# Patient Record
Sex: FEMALE | Race: WHITE | Employment: OTHER | URBAN - METROPOLITAN AREA
[De-identification: names, ages, dates, MRNs, and addresses within clinical notes are randomized per-mention and may not be internally consistent; named-entity substitution may affect disease eponyms.]

---

## 2017-01-25 DIAGNOSIS — E78.5 HYPERLIPIDEMIA LDL GOAL <130: ICD-10-CM

## 2017-01-25 DIAGNOSIS — F34.1 DYSTHYMIA: ICD-10-CM

## 2017-01-25 RX ORDER — SIMVASTATIN 20 MG
TABLET ORAL
Qty: 90 TABLET | Refills: 1 | Status: CANCELLED | OUTPATIENT
Start: 2017-01-25

## 2017-02-01 ENCOUNTER — OFFICE VISIT (OUTPATIENT)
Dept: INTERNAL MEDICINE CLINIC | Age: 82
End: 2017-02-01

## 2017-02-01 VITALS
BODY MASS INDEX: 20.02 KG/M2 | HEIGHT: 63 IN | SYSTOLIC BLOOD PRESSURE: 150 MMHG | DIASTOLIC BLOOD PRESSURE: 70 MMHG | TEMPERATURE: 97.6 F | OXYGEN SATURATION: 98 % | HEART RATE: 59 BPM | WEIGHT: 113 LBS

## 2017-02-01 DIAGNOSIS — E78.5 HYPERLIPIDEMIA LDL GOAL <130: ICD-10-CM

## 2017-02-01 DIAGNOSIS — I10 ESSENTIAL HYPERTENSION: Primary | ICD-10-CM

## 2017-02-01 DIAGNOSIS — N39.45 CONTINUOUS LEAKAGE OF URINE: ICD-10-CM

## 2017-02-01 DIAGNOSIS — D64.9 ANEMIA, UNSPECIFIED TYPE: ICD-10-CM

## 2017-02-01 DIAGNOSIS — E03.9 ACQUIRED HYPOTHYROIDISM: ICD-10-CM

## 2017-02-01 PROCEDURE — 4040F PNEUMOC VAC/ADMIN/RCVD: CPT | Performed by: FAMILY MEDICINE

## 2017-02-01 PROCEDURE — G8484 FLU IMMUNIZE NO ADMIN: HCPCS | Performed by: FAMILY MEDICINE

## 2017-02-01 PROCEDURE — 1036F TOBACCO NON-USER: CPT | Performed by: FAMILY MEDICINE

## 2017-02-01 PROCEDURE — 1123F ACP DISCUSS/DSCN MKR DOCD: CPT | Performed by: FAMILY MEDICINE

## 2017-02-01 PROCEDURE — 1090F PRES/ABSN URINE INCON ASSESS: CPT | Performed by: FAMILY MEDICINE

## 2017-02-01 PROCEDURE — G8419 CALC BMI OUT NRM PARAM NOF/U: HCPCS | Performed by: FAMILY MEDICINE

## 2017-02-01 PROCEDURE — G8399 PT W/DXA RESULTS DOCUMENT: HCPCS | Performed by: FAMILY MEDICINE

## 2017-02-01 PROCEDURE — G8427 DOCREV CUR MEDS BY ELIG CLIN: HCPCS | Performed by: FAMILY MEDICINE

## 2017-02-01 PROCEDURE — 99213 OFFICE O/P EST LOW 20 MIN: CPT | Performed by: FAMILY MEDICINE

## 2017-02-01 PROCEDURE — 0509F URINE INCON PLAN DOCD: CPT | Performed by: FAMILY MEDICINE

## 2017-02-01 RX ORDER — SIMVASTATIN 20 MG
TABLET ORAL
Qty: 90 TABLET | Refills: 1 | Status: SHIPPED | OUTPATIENT
Start: 2017-02-01 | End: 2018-01-24 | Stop reason: SDUPTHER

## 2017-02-01 RX ORDER — OXYBUTYNIN CHLORIDE 5 MG/1
TABLET ORAL
Qty: 180 TABLET | Refills: 1 | Status: SHIPPED | OUTPATIENT
Start: 2017-02-01

## 2017-02-01 ASSESSMENT — ENCOUNTER SYMPTOMS: SHORTNESS OF BREATH: 0

## 2017-02-02 ENCOUNTER — TELEPHONE (OUTPATIENT)
Dept: WOUND CARE | Age: 82
End: 2017-02-02

## 2017-02-02 DIAGNOSIS — E03.9 ACQUIRED HYPOTHYROIDISM: ICD-10-CM

## 2017-02-02 LAB — TSH SERPL DL<=0.05 MIU/L-ACNC: 12.63 UIU/ML (ref 0.27–4.2)

## 2017-02-02 RX ORDER — LEVOTHYROXINE SODIUM 0.07 MG/1
TABLET ORAL
Qty: 45 TABLET | Refills: 0 | Status: SHIPPED | OUTPATIENT
Start: 2017-02-02 | End: 2017-05-01 | Stop reason: SDUPTHER

## 2017-02-03 DIAGNOSIS — D64.9 ANEMIA, UNSPECIFIED TYPE: ICD-10-CM

## 2017-02-03 DIAGNOSIS — E78.5 HYPERLIPIDEMIA LDL GOAL <130: ICD-10-CM

## 2017-02-03 DIAGNOSIS — I10 ESSENTIAL HYPERTENSION: ICD-10-CM

## 2017-02-03 LAB
ALBUMIN SERPL-MCNC: 4.3 G/DL (ref 3.4–5)
ALP BLD-CCNC: 68 U/L (ref 40–129)
ALT SERPL-CCNC: 14 U/L (ref 10–40)
ANION GAP SERPL CALCULATED.3IONS-SCNC: 14 MMOL/L (ref 3–16)
AST SERPL-CCNC: 24 U/L (ref 15–37)
BASOPHILS ABSOLUTE: 0.1 K/UL (ref 0–0.2)
BASOPHILS RELATIVE PERCENT: 1.3 %
BILIRUB SERPL-MCNC: 0.7 MG/DL (ref 0–1)
BILIRUBIN DIRECT: <0.2 MG/DL (ref 0–0.3)
BILIRUBIN, INDIRECT: NORMAL MG/DL (ref 0–1)
BUN BLDV-MCNC: 12 MG/DL (ref 7–20)
CALCIUM SERPL-MCNC: 9 MG/DL (ref 8.3–10.6)
CHLORIDE BLD-SCNC: 99 MMOL/L (ref 99–110)
CHOLESTEROL, TOTAL: 152 MG/DL (ref 0–199)
CO2: 24 MMOL/L (ref 21–32)
CREAT SERPL-MCNC: 0.7 MG/DL (ref 0.6–1.2)
EOSINOPHILS ABSOLUTE: 0.1 K/UL (ref 0–0.6)
EOSINOPHILS RELATIVE PERCENT: 1.4 %
GFR AFRICAN AMERICAN: >60
GFR NON-AFRICAN AMERICAN: >60
GLUCOSE BLD-MCNC: 98 MG/DL (ref 70–99)
HCT VFR BLD CALC: 36.3 % (ref 36–48)
HDLC SERPL-MCNC: 57 MG/DL (ref 40–60)
HEMOGLOBIN: 11.8 G/DL (ref 12–16)
IRON SATURATION: 41 % (ref 15–50)
IRON: 124 UG/DL (ref 37–145)
LDL CHOLESTEROL CALCULATED: 76 MG/DL
LYMPHOCYTES ABSOLUTE: 1.8 K/UL (ref 1–5.1)
LYMPHOCYTES RELATIVE PERCENT: 30.3 %
MCH RBC QN AUTO: 29.5 PG (ref 26–34)
MCHC RBC AUTO-ENTMCNC: 32.4 G/DL (ref 31–36)
MCV RBC AUTO: 90.8 FL (ref 80–100)
MONOCYTES ABSOLUTE: 0.4 K/UL (ref 0–1.3)
MONOCYTES RELATIVE PERCENT: 7.4 %
NEUTROPHILS ABSOLUTE: 3.6 K/UL (ref 1.7–7.7)
NEUTROPHILS RELATIVE PERCENT: 59.6 %
PDW BLD-RTO: 14 % (ref 12.4–15.4)
PLATELET # BLD: 345 K/UL (ref 135–450)
PMV BLD AUTO: 7.9 FL (ref 5–10.5)
POTASSIUM SERPL-SCNC: 3.9 MMOL/L (ref 3.5–5.1)
RBC # BLD: 4 M/UL (ref 4–5.2)
SODIUM BLD-SCNC: 137 MMOL/L (ref 136–145)
TOTAL IRON BINDING CAPACITY: 299 UG/DL (ref 260–445)
TOTAL PROTEIN: 6.7 G/DL (ref 6.4–8.2)
TRIGL SERPL-MCNC: 95 MG/DL (ref 0–150)
VLDLC SERPL CALC-MCNC: 19 MG/DL
WBC # BLD: 6 K/UL (ref 4–11)

## 2017-02-03 RX ORDER — AMITRIPTYLINE HYDROCHLORIDE 10 MG/1
TABLET, FILM COATED ORAL
Qty: 90 TABLET | Refills: 1 | Status: SHIPPED | OUTPATIENT
Start: 2017-02-03 | End: 2017-07-31 | Stop reason: SDUPTHER

## 2017-02-20 DIAGNOSIS — E03.9 ACQUIRED HYPOTHYROIDISM: ICD-10-CM

## 2017-03-04 RX ORDER — LEVOTHYROXINE SODIUM 0.07 MG/1
TABLET ORAL
Qty: 45 TABLET | Refills: 0 | OUTPATIENT
Start: 2017-03-04

## 2017-05-01 ENCOUNTER — OFFICE VISIT (OUTPATIENT)
Dept: INTERNAL MEDICINE CLINIC | Age: 82
End: 2017-05-01

## 2017-05-01 VITALS
TEMPERATURE: 97.3 F | DIASTOLIC BLOOD PRESSURE: 52 MMHG | BODY MASS INDEX: 20.98 KG/M2 | HEART RATE: 71 BPM | SYSTOLIC BLOOD PRESSURE: 138 MMHG | RESPIRATION RATE: 14 BRPM | HEIGHT: 62 IN | WEIGHT: 114 LBS | OXYGEN SATURATION: 96 %

## 2017-05-01 DIAGNOSIS — E03.9 ACQUIRED HYPOTHYROIDISM: ICD-10-CM

## 2017-05-01 DIAGNOSIS — I10 ESSENTIAL HYPERTENSION: Primary | ICD-10-CM

## 2017-05-01 DIAGNOSIS — Z23 NEED FOR PNEUMOCOCCAL VACCINATION: ICD-10-CM

## 2017-05-01 LAB — TSH SERPL DL<=0.05 MIU/L-ACNC: 0.31 UIU/ML (ref 0.27–4.2)

## 2017-05-01 PROCEDURE — 1090F PRES/ABSN URINE INCON ASSESS: CPT | Performed by: FAMILY MEDICINE

## 2017-05-01 PROCEDURE — G8427 DOCREV CUR MEDS BY ELIG CLIN: HCPCS | Performed by: FAMILY MEDICINE

## 2017-05-01 PROCEDURE — 99213 OFFICE O/P EST LOW 20 MIN: CPT | Performed by: FAMILY MEDICINE

## 2017-05-01 PROCEDURE — G0009 ADMIN PNEUMOCOCCAL VACCINE: HCPCS | Performed by: FAMILY MEDICINE

## 2017-05-01 PROCEDURE — G8419 CALC BMI OUT NRM PARAM NOF/U: HCPCS | Performed by: FAMILY MEDICINE

## 2017-05-01 PROCEDURE — 1036F TOBACCO NON-USER: CPT | Performed by: FAMILY MEDICINE

## 2017-05-01 PROCEDURE — 1123F ACP DISCUSS/DSCN MKR DOCD: CPT | Performed by: FAMILY MEDICINE

## 2017-05-01 PROCEDURE — 4040F PNEUMOC VAC/ADMIN/RCVD: CPT | Performed by: FAMILY MEDICINE

## 2017-05-01 PROCEDURE — 90732 PPSV23 VACC 2 YRS+ SUBQ/IM: CPT | Performed by: FAMILY MEDICINE

## 2017-05-01 PROCEDURE — G8399 PT W/DXA RESULTS DOCUMENT: HCPCS | Performed by: FAMILY MEDICINE

## 2017-05-01 RX ORDER — LEVOTHYROXINE SODIUM 0.07 MG/1
TABLET ORAL
Qty: 90 TABLET | Refills: 0 | Status: SHIPPED | OUTPATIENT
Start: 2017-05-01 | End: 2017-10-06 | Stop reason: SDUPTHER

## 2017-05-01 ASSESSMENT — ENCOUNTER SYMPTOMS: SHORTNESS OF BREATH: 0

## 2017-05-12 ENCOUNTER — TELEPHONE (OUTPATIENT)
Dept: INTERNAL MEDICINE CLINIC | Age: 82
End: 2017-05-12

## 2017-07-28 DIAGNOSIS — I10 ESSENTIAL HYPERTENSION: ICD-10-CM

## 2017-07-31 DIAGNOSIS — F34.1 DYSTHYMIA: ICD-10-CM

## 2017-08-04 RX ORDER — AMITRIPTYLINE HYDROCHLORIDE 10 MG/1
TABLET, FILM COATED ORAL
Qty: 90 TABLET | Refills: 0 | Status: SHIPPED | OUTPATIENT
Start: 2017-08-04 | End: 2017-11-02 | Stop reason: SDUPTHER

## 2018-01-24 DIAGNOSIS — E78.5 HYPERLIPIDEMIA LDL GOAL <130: ICD-10-CM

## 2018-01-25 RX ORDER — SIMVASTATIN 20 MG
TABLET ORAL
Qty: 45 TABLET | Refills: 0 | Status: SHIPPED | OUTPATIENT
Start: 2018-01-25 | End: 2018-05-10 | Stop reason: SDUPTHER

## 2018-07-02 ENCOUNTER — HOSPITAL ENCOUNTER (OUTPATIENT)
Dept: OTHER | Age: 83
Discharge: HOME | End: 2018-07-02
Attending: FAMILY MEDICINE | Admitting: FAMILY MEDICINE
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
## 2018-07-05 ENCOUNTER — OFFICE VISIT (OUTPATIENT)
Dept: ORTHOPEDIC SURGERY | Age: 83
End: 2018-07-05

## 2018-07-05 VITALS
DIASTOLIC BLOOD PRESSURE: 70 MMHG | WEIGHT: 114 LBS | BODY MASS INDEX: 21.52 KG/M2 | SYSTOLIC BLOOD PRESSURE: 161 MMHG | HEART RATE: 65 BPM | RESPIRATION RATE: 16 BRPM | HEIGHT: 61 IN

## 2018-07-05 DIAGNOSIS — M19.132 SLAC (SCAPHOLUNATE ADVANCED COLLAPSE) OF WRIST, LEFT: ICD-10-CM

## 2018-07-05 DIAGNOSIS — S63.502A WRIST SPRAIN, LEFT, INITIAL ENCOUNTER: Primary | ICD-10-CM

## 2018-07-05 DIAGNOSIS — M81.0 OSTEOPOROSIS WITHOUT CURRENT PATHOLOGICAL FRACTURE, UNSPECIFIED OSTEOPOROSIS TYPE: ICD-10-CM

## 2018-07-05 PROCEDURE — 1123F ACP DISCUSS/DSCN MKR DOCD: CPT | Performed by: ORTHOPAEDIC SURGERY

## 2018-07-05 PROCEDURE — G8420 CALC BMI NORM PARAMETERS: HCPCS | Performed by: ORTHOPAEDIC SURGERY

## 2018-07-05 PROCEDURE — 1036F TOBACCO NON-USER: CPT | Performed by: ORTHOPAEDIC SURGERY

## 2018-07-05 PROCEDURE — 4040F PNEUMOC VAC/ADMIN/RCVD: CPT | Performed by: ORTHOPAEDIC SURGERY

## 2018-07-05 PROCEDURE — 99203 OFFICE O/P NEW LOW 30 MIN: CPT | Performed by: ORTHOPAEDIC SURGERY

## 2018-07-05 PROCEDURE — G8427 DOCREV CUR MEDS BY ELIG CLIN: HCPCS | Performed by: ORTHOPAEDIC SURGERY

## 2018-07-05 PROCEDURE — 1090F PRES/ABSN URINE INCON ASSESS: CPT | Performed by: ORTHOPAEDIC SURGERY

## 2018-07-05 NOTE — PROGRESS NOTES
ORTHOPAEDIC CONSULTATION NOTE    Chief Complaint   Patient presents with    Pain     Left wrist  DOI 7/1/18       HPI  80 y.o. female seen in consultation at the request of Dr Tameka Flanagan for evaluation of left wrist injury  She is ambidextrous  Onset 4 days ago   Injury/trauma yes, tripped, [address]  Pain is located dorsal wrist only  Worse with lifting, wrist ROM  Better with immobilization  Associated with N/A    History of symptoms none      I have reviewed and discussed the below pain assessment findings with the patient. Pain Assessment  Location of Pain: Wrist  Location Modifiers: Left  Severity of Pain: 4  Quality of Pain: Aching  Duration of Pain: A few minutes  Frequency of Pain: Intermittent  Aggravating Factors: Bending  Limiting Behavior: Some  Relieving Factors: Rest (brace)  Result of Injury: Yes  Work-Related Injury: No  Are there other pain locations you wish to document?: No    ROS  I have read over the ROS from the Patient History Form dated on 7/5/2018  Pertinent positives include hearing impairment, thryoid disaese, HTN, urinary incontinence, intermittent LBP, depression  Rest of 13 point ROS otherwise negative except per HPI, and scanned into the patient's chart under the Media tab.       Allergies   Allergen Reactions    Prozac [Fluoxetine Hcl] Nausea Only        Current Outpatient Prescriptions   Medication Sig Dispense Refill    simvastatin (ZOCOR) 20 MG tablet TAKE ONE- HALF (1/2) TABLET BY MOUTH ONCE NIGHTLY 45 tablet 2    metoprolol tartrate (LOPRESSOR) 25 MG tablet TAKE ONE TABLET BY MOUTH TWICE A  tablet 2    amitriptyline (ELAVIL) 10 MG tablet TAKE ONE TABLET BY MOUTH ONCE NIGHTLY 90 tablet 2    levothyroxine (SYNTHROID) 75 MCG tablet TAKE ONE TABLET BY MOUTH DAILY 90 tablet 1    oxybutynin (DITROPAN) 5 MG tablet TAKE ONE TABLET BY MOUTH TWICE A  tablet 1    vitamin B-12 (CYANOCOBALAMIN) 100 MCG tablet Take 50 mcg by mouth daily      Omega 3 1200 MG CAPS Take by distributions; AIN/PIN/IO intact  Left UE - normal shoulder and elbow exam and ROM, no TTP   Full E/F, S/P   Wrist - ecchymosis of dorsal wrist, into digits    TTP dorsal wrist only    Crepitus with ROM    No pain with Mccauley test    No TTP anatomic snuffbox and scaphoid tubercle    Imaging:  Images were personally reviewed by myself and discussed with the patient  Narrative   EXAMINATION:   3 XRAY VIEWS OF THE LEFT WRIST; 3 XRAY VIEWS OF THE LEFT HAND       7/2/2018 12:58 pm; 7/2/2018 1:04 pm       COMPARISON:   None       HISTORY:   ORDERING PHYSICIAN PROVIDED HISTORY: Fall, initial encounter   TECHNOLOGIST PROVIDED HISTORY:   Technologist Provided Reason for Exam: fell last night/pain laterally of left   wrist   Acuity: Acute   Type of Encounter: Initial; ORDERING PHYSICIAN PROVIDED HISTORY: Fall,   initial encounter   TECHNOLOGIST PROVIDED HISTORY:   Technologist Provided Reason for Exam: fell last night/pain laterally left   wrist   Acuity: Acute   Type of Encounter: Initial       FINDINGS:   Three views of the left wrist demonstrate calcifications in the TFC complex. There is widening of the scapholunate interval with rotary subluxation of the   scaphoid.  There is advanced collapse of the capitate into the scapholunate   interval.  No fracture appreciated.       Carpometacarpal alignment is normal.  Moderate degenerative changes 1st MCP   joint.  Moderate degenerative changes 2nd and 3rd MCP joint.  Spurring about   the interphalangeal joints is noted.       Mild bony demineralization.           Impression   No acute bony fracture is appreciated.  Study limited by bony   demineralization.       Marked degenerative changes about the hand and wrist.  There is widened   scapholunate interval with findings to suggest rotary subluxation of the   scaphoid and SLAC wrist.         Assessment & Plan:  80 y.o. female who presents with    Diagnosis Orders   1. Wrist sprain, left, initial encounter     2.  Slac

## 2018-08-04 DIAGNOSIS — I10 ESSENTIAL HYPERTENSION: ICD-10-CM

## 2018-08-04 DIAGNOSIS — F34.1 DYSTHYMIA: ICD-10-CM

## 2018-08-06 RX ORDER — AMITRIPTYLINE HYDROCHLORIDE 10 MG/1
TABLET, FILM COATED ORAL
Qty: 90 TABLET | Refills: 1 | OUTPATIENT
Start: 2018-08-06

## 2018-08-07 DIAGNOSIS — F34.1 DYSTHYMIA: ICD-10-CM

## 2018-08-07 DIAGNOSIS — I10 ESSENTIAL HYPERTENSION: ICD-10-CM

## 2018-08-07 RX ORDER — AMITRIPTYLINE HYDROCHLORIDE 10 MG/1
TABLET, FILM COATED ORAL
Qty: 90 TABLET | Refills: 1 | OUTPATIENT
Start: 2018-08-07

## 2018-08-21 DIAGNOSIS — F34.1 DYSTHYMIA: ICD-10-CM

## 2018-08-21 DIAGNOSIS — I10 ESSENTIAL HYPERTENSION: ICD-10-CM

## 2018-08-21 RX ORDER — AMITRIPTYLINE HYDROCHLORIDE 10 MG/1
TABLET, FILM COATED ORAL
Qty: 90 TABLET | Refills: 1 | OUTPATIENT
Start: 2018-08-21

## 2018-08-30 DIAGNOSIS — F34.1 DYSTHYMIA: ICD-10-CM

## 2018-08-30 RX ORDER — AMITRIPTYLINE HYDROCHLORIDE 10 MG/1
TABLET, FILM COATED ORAL
Qty: 90 TABLET | Refills: 1 | OUTPATIENT
Start: 2018-08-30

## 2018-10-02 ENCOUNTER — HOSPITAL ENCOUNTER (INPATIENT)
Age: 83
LOS: 2 days | Discharge: HOME | DRG: 308 | End: 2018-10-04
Attending: EMERGENCY MEDICINE | Admitting: INTERNAL MEDICINE
Payer: MEDICARE

## 2018-10-02 ENCOUNTER — APPOINTMENT (OUTPATIENT)
Dept: GENERAL RADIOLOGY | Age: 83
DRG: 308 | End: 2018-10-02
Payer: MEDICARE

## 2018-10-02 ENCOUNTER — APPOINTMENT (OUTPATIENT)
Dept: CT IMAGING | Age: 83
DRG: 308 | End: 2018-10-02
Payer: MEDICARE

## 2018-10-02 DIAGNOSIS — I48.91 ATRIAL FIBRILLATION WITH RVR (HCC): ICD-10-CM

## 2018-10-02 DIAGNOSIS — J18.9 PNEUMONIA DUE TO ORGANISM: ICD-10-CM

## 2018-10-02 DIAGNOSIS — A41.9 SEPTICEMIA (HCC): Primary | ICD-10-CM

## 2018-10-02 PROBLEM — E87.1 HYPONATREMIA: Status: ACTIVE | Noted: 2018-10-02

## 2018-10-02 PROBLEM — D64.9 ANEMIA: Status: ACTIVE | Noted: 2018-10-02

## 2018-10-02 LAB
A/G RATIO: 1.1 (ref 1.1–2.2)
ALBUMIN SERPL-MCNC: 4.1 G/DL (ref 3.4–5)
ALP BLD-CCNC: 79 U/L (ref 40–129)
ALT SERPL-CCNC: 9 U/L (ref 10–40)
ANION GAP SERPL CALCULATED.3IONS-SCNC: 12 MMOL/L (ref 3–16)
APTT: 29.7 SEC (ref 26–36)
AST SERPL-CCNC: 22 U/L (ref 15–37)
BASOPHILS ABSOLUTE: 0 K/UL (ref 0–0.2)
BASOPHILS RELATIVE PERCENT: 0.3 %
BILIRUB SERPL-MCNC: 1.2 MG/DL (ref 0–1)
BUN BLDV-MCNC: 11 MG/DL (ref 7–20)
CALCIUM SERPL-MCNC: 9.4 MG/DL (ref 8.3–10.6)
CHLORIDE BLD-SCNC: 93 MMOL/L (ref 99–110)
CO2: 23 MMOL/L (ref 21–32)
CREAT SERPL-MCNC: 0.6 MG/DL (ref 0.6–1.2)
EKG ATRIAL RATE: 129 BPM
EKG ATRIAL RATE: 150 BPM
EKG DIAGNOSIS: NORMAL
EKG DIAGNOSIS: NORMAL
EKG Q-T INTERVAL: 280 MS
EKG Q-T INTERVAL: 294 MS
EKG QRS DURATION: 90 MS
EKG QRS DURATION: 92 MS
EKG QTC CALCULATION (BAZETT): 439 MS
EKG QTC CALCULATION (BAZETT): 445 MS
EKG R AXIS: -65 DEGREES
EKG R AXIS: -69 DEGREES
EKG T AXIS: 94 DEGREES
EKG T AXIS: 97 DEGREES
EKG VENTRICULAR RATE: 138 BPM
EKG VENTRICULAR RATE: 148 BPM
EOSINOPHILS ABSOLUTE: 0 K/UL (ref 0–0.6)
EOSINOPHILS RELATIVE PERCENT: 0 %
GFR AFRICAN AMERICAN: >60
GFR NON-AFRICAN AMERICAN: >60
GLOBULIN: 3.6 G/DL
GLUCOSE BLD-MCNC: 111 MG/DL (ref 70–99)
HCT VFR BLD CALC: 34.7 % (ref 36–48)
HEMOGLOBIN: 11.6 G/DL (ref 12–16)
INR BLD: 1.14 (ref 0.86–1.14)
LACTIC ACID: 1.3 MMOL/L (ref 0.4–2)
LYMPHOCYTES ABSOLUTE: 0.5 K/UL (ref 1–5.1)
LYMPHOCYTES RELATIVE PERCENT: 2.9 %
MCH RBC QN AUTO: 31.4 PG (ref 26–34)
MCHC RBC AUTO-ENTMCNC: 33.3 G/DL (ref 31–36)
MCV RBC AUTO: 94.2 FL (ref 80–100)
MONOCYTES ABSOLUTE: 0.5 K/UL (ref 0–1.3)
MONOCYTES RELATIVE PERCENT: 2.9 %
NEUTROPHILS ABSOLUTE: 15.7 K/UL (ref 1.7–7.7)
NEUTROPHILS RELATIVE PERCENT: 93.9 %
PDW BLD-RTO: 14.2 % (ref 12.4–15.4)
PLATELET # BLD: 280 K/UL (ref 135–450)
PMV BLD AUTO: 7.1 FL (ref 5–10.5)
POTASSIUM REFLEX MAGNESIUM: 3.8 MMOL/L (ref 3.5–5.1)
PRO-BNP: 2328 PG/ML (ref 0–449)
PROTHROMBIN TIME: 13 SEC (ref 9.8–13)
RAPID INFLUENZA  B AGN: NEGATIVE
RAPID INFLUENZA A AGN: NEGATIVE
RBC # BLD: 3.68 M/UL (ref 4–5.2)
SODIUM BLD-SCNC: 128 MMOL/L (ref 136–145)
TOTAL PROTEIN: 7.7 G/DL (ref 6.4–8.2)
TROPONIN: <0.01 NG/ML
TSH REFLEX: 3.74 UIU/ML (ref 0.27–4.2)
WBC # BLD: 16.8 K/UL (ref 4–11)

## 2018-10-02 PROCEDURE — 84439 ASSAY OF FREE THYROXINE: CPT

## 2018-10-02 PROCEDURE — 36415 COLL VENOUS BLD VENIPUNCTURE: CPT

## 2018-10-02 PROCEDURE — 80053 COMPREHEN METABOLIC PANEL: CPT

## 2018-10-02 PROCEDURE — 6370000000 HC RX 637 (ALT 250 FOR IP): Performed by: INTERNAL MEDICINE

## 2018-10-02 PROCEDURE — 84443 ASSAY THYROID STIM HORMONE: CPT

## 2018-10-02 PROCEDURE — 96361 HYDRATE IV INFUSION ADD-ON: CPT

## 2018-10-02 PROCEDURE — 87804 INFLUENZA ASSAY W/OPTIC: CPT

## 2018-10-02 PROCEDURE — 2500000003 HC RX 250 WO HCPCS: Performed by: INTERNAL MEDICINE

## 2018-10-02 PROCEDURE — 2060000000 HC ICU INTERMEDIATE R&B

## 2018-10-02 PROCEDURE — 83880 ASSAY OF NATRIURETIC PEPTIDE: CPT

## 2018-10-02 PROCEDURE — 96376 TX/PRO/DX INJ SAME DRUG ADON: CPT

## 2018-10-02 PROCEDURE — 85025 COMPLETE CBC W/AUTO DIFF WBC: CPT

## 2018-10-02 PROCEDURE — 2580000003 HC RX 258: Performed by: PHYSICIAN ASSISTANT

## 2018-10-02 PROCEDURE — 93005 ELECTROCARDIOGRAM TRACING: CPT | Performed by: PHYSICIAN ASSISTANT

## 2018-10-02 PROCEDURE — 87040 BLOOD CULTURE FOR BACTERIA: CPT

## 2018-10-02 PROCEDURE — 6360000002 HC RX W HCPCS: Performed by: PHYSICIAN ASSISTANT

## 2018-10-02 PROCEDURE — 85730 THROMBOPLASTIN TIME PARTIAL: CPT

## 2018-10-02 PROCEDURE — 6360000002 HC RX W HCPCS: Performed by: INTERNAL MEDICINE

## 2018-10-02 PROCEDURE — 2500000003 HC RX 250 WO HCPCS: Performed by: PHYSICIAN ASSISTANT

## 2018-10-02 PROCEDURE — 93010 ELECTROCARDIOGRAM REPORT: CPT | Performed by: INTERNAL MEDICINE

## 2018-10-02 PROCEDURE — 99291 CRITICAL CARE FIRST HOUR: CPT

## 2018-10-02 PROCEDURE — 96366 THER/PROPH/DIAG IV INF ADDON: CPT

## 2018-10-02 PROCEDURE — 71045 X-RAY EXAM CHEST 1 VIEW: CPT

## 2018-10-02 PROCEDURE — 96365 THER/PROPH/DIAG IV INF INIT: CPT

## 2018-10-02 PROCEDURE — 2580000003 HC RX 258: Performed by: INTERNAL MEDICINE

## 2018-10-02 PROCEDURE — 71250 CT THORAX DX C-: CPT

## 2018-10-02 PROCEDURE — 96375 TX/PRO/DX INJ NEW DRUG ADDON: CPT

## 2018-10-02 PROCEDURE — 85610 PROTHROMBIN TIME: CPT

## 2018-10-02 PROCEDURE — 83605 ASSAY OF LACTIC ACID: CPT

## 2018-10-02 PROCEDURE — 84484 ASSAY OF TROPONIN QUANT: CPT

## 2018-10-02 RX ORDER — DILTIAZEM HYDROCHLORIDE 5 MG/ML
10 INJECTION INTRAVENOUS ONCE
Status: DISCONTINUED | OUTPATIENT
Start: 2018-10-02 | End: 2018-10-02

## 2018-10-02 RX ORDER — SODIUM CHLORIDE 0.9 % (FLUSH) 0.9 %
10 SYRINGE (ML) INJECTION PRN
Status: DISCONTINUED | OUTPATIENT
Start: 2018-10-02 | End: 2018-10-04 | Stop reason: HOSPADM

## 2018-10-02 RX ORDER — DILTIAZEM HYDROCHLORIDE 5 MG/ML
10 INJECTION INTRAVENOUS ONCE
Status: COMPLETED | OUTPATIENT
Start: 2018-10-02 | End: 2018-10-02

## 2018-10-02 RX ORDER — POTASSIUM CHLORIDE 7.45 MG/ML
10 INJECTION INTRAVENOUS PRN
Status: DISCONTINUED | OUTPATIENT
Start: 2018-10-02 | End: 2018-10-04 | Stop reason: HOSPADM

## 2018-10-02 RX ORDER — ONDANSETRON 2 MG/ML
4 INJECTION INTRAMUSCULAR; INTRAVENOUS EVERY 6 HOURS PRN
Status: DISCONTINUED | OUTPATIENT
Start: 2018-10-02 | End: 2018-10-04 | Stop reason: HOSPADM

## 2018-10-02 RX ORDER — OXYBUTYNIN CHLORIDE 5 MG/1
5 TABLET ORAL 2 TIMES DAILY
Status: DISCONTINUED | OUTPATIENT
Start: 2018-10-02 | End: 2018-10-04 | Stop reason: HOSPADM

## 2018-10-02 RX ORDER — AZITHROMYCIN 250 MG/1
250 TABLET, FILM COATED ORAL NIGHTLY
Status: DISCONTINUED | OUTPATIENT
Start: 2018-10-02 | End: 2018-10-04 | Stop reason: HOSPADM

## 2018-10-02 RX ORDER — SIMVASTATIN 10 MG
10 TABLET ORAL NIGHTLY
Status: DISCONTINUED | OUTPATIENT
Start: 2018-10-02 | End: 2018-10-04 | Stop reason: HOSPADM

## 2018-10-02 RX ORDER — 0.9 % SODIUM CHLORIDE 0.9 %
1000 INTRAVENOUS SOLUTION INTRAVENOUS ONCE
Status: COMPLETED | OUTPATIENT
Start: 2018-10-02 | End: 2018-10-02

## 2018-10-02 RX ORDER — ASPIRIN 81 MG/1
81 TABLET ORAL NIGHTLY
Status: DISCONTINUED | OUTPATIENT
Start: 2018-10-02 | End: 2018-10-04 | Stop reason: HOSPADM

## 2018-10-02 RX ORDER — UBIDECARENONE 75 MG
50 CAPSULE ORAL DAILY
Status: DISCONTINUED | OUTPATIENT
Start: 2018-10-02 | End: 2018-10-04 | Stop reason: HOSPADM

## 2018-10-02 RX ORDER — SODIUM CHLORIDE 0.9 % (FLUSH) 0.9 %
10 SYRINGE (ML) INJECTION EVERY 12 HOURS SCHEDULED
Status: DISCONTINUED | OUTPATIENT
Start: 2018-10-02 | End: 2018-10-04 | Stop reason: HOSPADM

## 2018-10-02 RX ORDER — AMITRIPTYLINE HYDROCHLORIDE 10 MG/1
10 TABLET, FILM COATED ORAL NIGHTLY
Status: DISCONTINUED | OUTPATIENT
Start: 2018-10-02 | End: 2018-10-04 | Stop reason: HOSPADM

## 2018-10-02 RX ORDER — POTASSIUM CHLORIDE 20 MEQ/1
40 TABLET, EXTENDED RELEASE ORAL PRN
Status: DISCONTINUED | OUTPATIENT
Start: 2018-10-02 | End: 2018-10-04 | Stop reason: HOSPADM

## 2018-10-02 RX ORDER — LEVOTHYROXINE SODIUM 0.07 MG/1
75 TABLET ORAL
Status: DISCONTINUED | OUTPATIENT
Start: 2018-10-03 | End: 2018-10-04 | Stop reason: HOSPADM

## 2018-10-02 RX ORDER — LEVOFLOXACIN 5 MG/ML
750 INJECTION, SOLUTION INTRAVENOUS ONCE
Status: COMPLETED | OUTPATIENT
Start: 2018-10-02 | End: 2018-10-02

## 2018-10-02 RX ORDER — M-VIT,TX,IRON,MINS/CALC/FOLIC 27MG-0.4MG
1 TABLET ORAL DAILY
Status: DISCONTINUED | OUTPATIENT
Start: 2018-10-02 | End: 2018-10-04 | Stop reason: HOSPADM

## 2018-10-02 RX ADMIN — ASPIRIN 81 MG: 81 TABLET, COATED ORAL at 21:55

## 2018-10-02 RX ADMIN — AZITHROMYCIN 250 MG: 250 TABLET, FILM COATED ORAL at 21:55

## 2018-10-02 RX ADMIN — LEVOFLOXACIN 750 MG: 5 INJECTION, SOLUTION INTRAVENOUS at 15:09

## 2018-10-02 RX ADMIN — CEFTRIAXONE SODIUM 1 G: 10 INJECTION, POWDER, FOR SOLUTION INTRAVENOUS at 16:48

## 2018-10-02 RX ADMIN — DILTIAZEM HYDROCHLORIDE 5 MG/HR: 5 INJECTION INTRAVENOUS at 17:15

## 2018-10-02 RX ADMIN — SODIUM CHLORIDE 1000 ML: 9 INJECTION, SOLUTION INTRAVENOUS at 15:09

## 2018-10-02 RX ADMIN — DILTIAZEM HYDROCHLORIDE 10 MG: 5 INJECTION INTRAVENOUS at 16:57

## 2018-10-02 RX ADMIN — SIMVASTATIN 10 MG: 10 TABLET, FILM COATED ORAL at 22:05

## 2018-10-02 RX ADMIN — METOPROLOL TARTRATE 25 MG: 25 TABLET ORAL at 21:55

## 2018-10-02 RX ADMIN — Medication 10 ML: at 22:06

## 2018-10-02 RX ADMIN — AMITRIPTYLINE HYDROCHLORIDE 10 MG: 10 TABLET, FILM COATED ORAL at 21:55

## 2018-10-02 RX ADMIN — OXYBUTYNIN CHLORIDE 5 MG: 5 TABLET ORAL at 21:55

## 2018-10-02 RX ADMIN — DILTIAZEM HYDROCHLORIDE 10 MG: 5 INJECTION INTRAVENOUS at 15:05

## 2018-10-02 RX ADMIN — ENOXAPARIN SODIUM 50 MG: 60 INJECTION SUBCUTANEOUS at 21:57

## 2018-10-02 ASSESSMENT — ENCOUNTER SYMPTOMS
DOUBLE VISION: 0
SORE THROAT: 0
BLOOD IN STOOL: 0
SHORTNESS OF BREATH: 1
SHORTNESS OF BREATH: 0
BLURRED VISION: 0
NAUSEA: 0
VOMITING: 0
COUGH: 1
TROUBLE SWALLOWING: 0
DIARRHEA: 1
ABDOMINAL PAIN: 0

## 2018-10-02 ASSESSMENT — PAIN SCALES - GENERAL
PAINLEVEL_OUTOF10: 0

## 2018-10-02 NOTE — H&P
History and Physical  Dr. Carole Mishra  10/2/2018    PCP: Dane Swann MD    Cc:   Chief Complaint   Patient presents with    Atrial Fibrillation     went to MD for cough EKG showed afib with RVR. HPI:  Michael Felix is a 80 y.o. female who  has a past medical history of Carpal tunnel syndrome of left wrist; Chronic low back pain; Depression; [place] (hard of hearing); Hyperlipidemia LDL goal <130; Hypothyroid; Insomnia; Osteoporosis; and Urinary incontinence. Patient presents with Atrial fibrillation ([address] HPI of presenting complaint in block format: (1-3 for expanded problem focused, ?4 for detailed/comprehensive)   Location: generalized weakness, chest cough  Duration: for about a week  Timing:    Context:  80 y.o. female that presents to the emergency department with a chief complaint feeling generally weak, mild shortness of breath and cough. She's been having a cough for the past one week. Probably had an appointment with her family physician today and daughter states that she was extremely weak or having difficulty even getting her out of bed. Patient one episode of diarrhea today. She denies any chest pain, abdominal pain, dysuria, hematuria, bloody stool or any pain at all. Patient's initial heart rate was in the 140s and showed atrial fibrillation. Case has been discussed today with PCP, Dr Vladimir Fajardo  Severity: severe - pt is so weak that she can barely get out of bed  Quality:    Modifying Factors: nothing makes it better or worse  Associated Signs or Symptoms: denies CP. +Palpitation. No fevers/chills. Denies dysuria. +diarrhea. Problem list of hospitalization thus far:   Active Hospital Problems    Diagnosis    Hyponatremia [E87.1]    Anemia [D64.9]    Atrial fibrillation (HCC) [I48.91]    Hyperlipidemia LDL goal <130 [E78.5]    Hypothyroid [E03.9]         Review of Systems: (1 system for EPF, 2-9 for detailed, 10+ for comprehensive)  Review of Systems   Constitutional: Positive for

## 2018-10-02 NOTE — ED PROVIDER NOTES
[address] [facility]  eMERGENCY dEPARTMENT eNCOUnter        Pt Name: Debby Castillo  MRN: [id number]  [place] 7/21/1931  Date of evaluation: 10/2/2018  Provider: Steve Arlington, PA-C  PCP: Jorje Hameed MD  ED Attending: Jonathan Sahni DO    CHIEF COMPLAINT       Chief Complaint   Patient presents with    Atrial Fibrillation     went to MD for cough EKG showed afib with RVR. Critical Care  There was a high probability of life-threatening clinical deterioration in the patient's condition requiring my urgent intervention. Total critical care time with the patient was 37 minutes excluding separately reportable procedures. Critical care required due to patients Atrial fibrillation with RVR requiring IV medication and sepsis. HISTORY OF PRESENT ILLNESS   (Location/Symptom, Timing/Onset, Context/Setting, Quality, Duration, Modifying Factors, Severity)  Note limiting factors. Debby Castillo is a 80 y.o. female that presents to the emergency department with a chief complaint feeling generally weak, mild shortness of breath and cough. She's been having a cough for the past one week. Probably had an appointment with her family physician today and daughter states that she was extremely weak or having difficulty even getting her out of bed. Patient one episode of diarrhea today. She denies any chest pain, abdominal pain, dysuria, hematuria, bloody stool or any pain at all. Patient's initial heart rate was in the 140s and showed atrial fibrillation. However when I see her when she is in the room she is only in the low 100s and more sinus tachycardia on telemetry. Apparently the family physician was also worried about \"thyroid issues. \"  Patient denies any wheezing or history of asthma or COPD. Nursing Notes were all reviewed and agreed with or any disagreements were addressed  in the HPI.     REVIEW OF SYSTEMS    (2-9 systems for level 4, 10 or more for level 5)     Review of Systems   Constitutional: Positive for fatigue. Negative for chills and fever. HENT: Negative for sore throat and trouble swallowing. Respiratory: Positive for cough and shortness of breath. Cardiovascular: Negative for chest pain. Gastrointestinal: Positive for diarrhea. Negative for abdominal pain, blood in stool, nausea and vomiting. Genitourinary: Negative for dysuria and hematuria. Musculoskeletal: Negative for myalgias. Skin: Negative for rash. Neurological: Negative for headaches. Positives and Pertinent negatives as per HPI. Except as noted above in the ROS, all other systems were reviewed and negative. PAST MEDICAL HISTORY     Past Medical History:   Diagnosis Date    Carpal tunnel syndrome of left wrist     Chronic low back pain     Depression     [place] (hard of hearing)     Hyperlipidemia LDL goal <130     Hypothyroid     Insomnia     Osteoporosis     Urinary incontinence          SURGICAL HISTORY       Past Surgical History:   Procedure Laterality Date    BACK SURGERY           CURRENT MEDICATIONS       Previous Medications    AMITRIPTYLINE (ELAVIL) 10 MG TABLET    TAKE ONE TABLET BY MOUTH ONCE NIGHTLY    ASPIRIN 81 MG EC TABLET    Take 81 mg by mouth daily.     CALCIUM CARBONATE-VITAMIN D (CALTRATE 600+D PO)    Take 1 tablet by mouth daily     LEVOTHYROXINE (SYNTHROID) 75 MCG TABLET    TAKE ONE TABLET BY MOUTH DAILY    METOPROLOL TARTRATE (LOPRESSOR) 25 MG TABLET    TAKE ONE TABLET BY MOUTH TWICE A DAY    MULTIVITAMIN-IRON-MINERALS-FOLIC ACID (CENTRUM) CHEWABLE TABLET    Take 1 tablet by mouth daily    OXYBUTYNIN (DITROPAN) 5 MG TABLET    TAKE ONE TABLET BY MOUTH TWICE A DAY    SIMVASTATIN (ZOCOR) 20 MG TABLET    TAKE ONE- HALF (1/2) TABLET BY MOUTH ONCE NIGHTLY    VITAMIN B-12 (CYANOCOBALAMIN) 100 MCG TABLET    Take 50 mcg by mouth daily         ALLERGIES     Prozac [fluoxetine hcl]    FAMILY HISTORY       Family History   Problem Relation Age of Onset    Heart Disease Father           SOCIAL HISTORY       Social History     Social History    Marital status:      Spouse name: N/A    Number of children: 3    Years of education: N/A     Occupational History    retired       Social History Main Topics    Smoking status: Former Smoker     Packs/day: 0.25     Years: 10.00     Types: Cigarettes    Smokeless tobacco: Never Used    Alcohol use 0.6 oz/week     1 Glasses of wine per week      Comment: occasional glass of wine or mixed drink    Drug use: No    Sexual activity: Not Currently     Other Topics Concern    None     Social History Narrative    None       SCREENINGS             PHYSICAL EXAM    (up to 7 for level 4, 8 or more for level 5)     ED Triage Vitals [10/02/18 1335]   BP Temp Temp Source Pulse Resp SpO2 Height Weight   (!) 161/91 99.7 °F (37.6 °C) Infrared 140 18 92 % 5' 5\" (1.651 m) 90 lb (40.8 kg)       Physical Exam   Constitutional: She is oriented to person, place, and time. She appears well-developed and well-nourished. HENT:   Head: Atraumatic. Mouth/Throat: Oropharynx is clear and moist. No oropharyngeal exudate. Eyes: Right eye exhibits no discharge. Left eye exhibits no discharge. Neck: Normal range of motion. Cardiovascular: Regular rhythm. Exam reveals no gallop and no friction rub. No murmur heard. Tachycardic rate   Pulmonary/Chest: Effort normal. No respiratory distress. She has no wheezes. She has rales. Crackles appreciated at bilateral bases without retractions or accessory muscle use or wheezing. Abdominal: Soft. Bowel sounds are normal. She exhibits no distension and no mass. There is no tenderness. There is no rebound and no guarding. Musculoskeletal: Normal range of motion. She exhibits no edema, tenderness or deformity. Neurological: She is alert and oriented to person, place, and time. No cranial nerve deficit. Skin: Skin is warm and dry. No rash noted.  She is not

## 2018-10-03 ENCOUNTER — APPOINTMENT (OUTPATIENT)
Dept: GENERAL RADIOLOGY | Age: 83
DRG: 308 | End: 2018-10-03
Payer: MEDICARE

## 2018-10-03 LAB
ANION GAP SERPL CALCULATED.3IONS-SCNC: 12 MMOL/L (ref 3–16)
BASOPHILS ABSOLUTE: 0 K/UL (ref 0–0.2)
BASOPHILS RELATIVE PERCENT: 0.4 %
BUN BLDV-MCNC: 10 MG/DL (ref 7–20)
CALCIUM SERPL-MCNC: 8.4 MG/DL (ref 8.3–10.6)
CHLORIDE BLD-SCNC: 95 MMOL/L (ref 99–110)
CO2: 22 MMOL/L (ref 21–32)
CREAT SERPL-MCNC: 0.6 MG/DL (ref 0.6–1.2)
EKG ATRIAL RATE: 69 BPM
EKG DIAGNOSIS: NORMAL
EKG P AXIS: 67 DEGREES
EKG P-R INTERVAL: 216 MS
EKG Q-T INTERVAL: 430 MS
EKG QRS DURATION: 90 MS
EKG QTC CALCULATION (BAZETT): 460 MS
EKG R AXIS: -58 DEGREES
EKG T AXIS: 31 DEGREES
EKG VENTRICULAR RATE: 69 BPM
EOSINOPHILS ABSOLUTE: 0 K/UL (ref 0–0.6)
EOSINOPHILS RELATIVE PERCENT: 0 %
GFR AFRICAN AMERICAN: >60
GFR NON-AFRICAN AMERICAN: >60
GLUCOSE BLD-MCNC: 107 MG/DL (ref 70–99)
HCT VFR BLD CALC: 30 % (ref 36–48)
HEMOGLOBIN: 10.3 G/DL (ref 12–16)
INR BLD: 1.37 (ref 0.86–1.14)
LEFT VENTRICULAR EJECTION FRACTION HIGH VALUE: 65 %
LEFT VENTRICULAR EJECTION FRACTION MODE: NORMAL
LV EF: 65 %
LVEF MODALITY: NORMAL
LYMPHOCYTES ABSOLUTE: 0.9 K/UL (ref 1–5.1)
LYMPHOCYTES RELATIVE PERCENT: 8.3 %
MCH RBC QN AUTO: 32.3 PG (ref 26–34)
MCHC RBC AUTO-ENTMCNC: 34.5 G/DL (ref 31–36)
MCV RBC AUTO: 93.6 FL (ref 80–100)
MONOCYTES ABSOLUTE: 0.6 K/UL (ref 0–1.3)
MONOCYTES RELATIVE PERCENT: 5.1 %
NEUTROPHILS ABSOLUTE: 9.4 K/UL (ref 1.7–7.7)
NEUTROPHILS RELATIVE PERCENT: 86.2 %
PDW BLD-RTO: 14.3 % (ref 12.4–15.4)
PLATELET # BLD: 233 K/UL (ref 135–450)
PMV BLD AUTO: 7 FL (ref 5–10.5)
POTASSIUM SERPL-SCNC: 4 MMOL/L (ref 3.5–5.1)
PROTHROMBIN TIME: 15.6 SEC (ref 9.8–13)
RBC # BLD: 3.2 M/UL (ref 4–5.2)
SODIUM BLD-SCNC: 129 MMOL/L (ref 136–145)
T4 FREE: 1.5 NG/DL (ref 0.9–1.8)
TSH SERPL DL<=0.05 MIU/L-ACNC: 4.62 UIU/ML (ref 0.27–4.2)
WBC # BLD: 10.9 K/UL (ref 4–11)

## 2018-10-03 PROCEDURE — 85025 COMPLETE CBC W/AUTO DIFF WBC: CPT

## 2018-10-03 PROCEDURE — 2580000003 HC RX 258: Performed by: INTERNAL MEDICINE

## 2018-10-03 PROCEDURE — 2060000000 HC ICU INTERMEDIATE R&B

## 2018-10-03 PROCEDURE — 6370000000 HC RX 637 (ALT 250 FOR IP): Performed by: INTERNAL MEDICINE

## 2018-10-03 PROCEDURE — 2500000003 HC RX 250 WO HCPCS: Performed by: INTERNAL MEDICINE

## 2018-10-03 PROCEDURE — 93306 TTE W/DOPPLER COMPLETE: CPT

## 2018-10-03 PROCEDURE — 99223 1ST HOSP IP/OBS HIGH 75: CPT | Performed by: INTERNAL MEDICINE

## 2018-10-03 PROCEDURE — 2580000003 HC RX 258: Performed by: PHYSICIAN ASSISTANT

## 2018-10-03 PROCEDURE — 36415 COLL VENOUS BLD VENIPUNCTURE: CPT

## 2018-10-03 PROCEDURE — 85610 PROTHROMBIN TIME: CPT

## 2018-10-03 PROCEDURE — 93005 ELECTROCARDIOGRAM TRACING: CPT | Performed by: INTERNAL MEDICINE

## 2018-10-03 PROCEDURE — 6360000002 HC RX W HCPCS: Performed by: INTERNAL MEDICINE

## 2018-10-03 PROCEDURE — 93307 TTE W/O DOPPLER COMPLETE: CPT

## 2018-10-03 PROCEDURE — 93010 ELECTROCARDIOGRAM REPORT: CPT | Performed by: INTERNAL MEDICINE

## 2018-10-03 PROCEDURE — 71046 X-RAY EXAM CHEST 2 VIEWS: CPT

## 2018-10-03 PROCEDURE — 80048 BASIC METABOLIC PNL TOTAL CA: CPT

## 2018-10-03 RX ORDER — ACETAMINOPHEN 80 MG
TABLET,CHEWABLE ORAL
Status: COMPLETED
Start: 2018-10-03 | End: 2018-10-03

## 2018-10-03 RX ADMIN — ENOXAPARIN SODIUM 50 MG: 60 INJECTION SUBCUTANEOUS at 11:08

## 2018-10-03 RX ADMIN — AMITRIPTYLINE HYDROCHLORIDE 10 MG: 10 TABLET, FILM COATED ORAL at 20:53

## 2018-10-03 RX ADMIN — SODIUM CHLORIDE, PRESERVATIVE FREE 10 ML: 5 INJECTION INTRAVENOUS at 06:06

## 2018-10-03 RX ADMIN — METOPROLOL TARTRATE 37.5 MG: 25 TABLET ORAL at 20:52

## 2018-10-03 RX ADMIN — AZITHROMYCIN 250 MG: 250 TABLET, FILM COATED ORAL at 20:53

## 2018-10-03 RX ADMIN — SIMVASTATIN 10 MG: 10 TABLET, FILM COATED ORAL at 20:53

## 2018-10-03 RX ADMIN — METOPROLOL TARTRATE 25 MG: 25 TABLET ORAL at 10:52

## 2018-10-03 RX ADMIN — LEVOTHYROXINE SODIUM 75 MCG: 75 TABLET ORAL at 06:06

## 2018-10-03 RX ADMIN — ASPIRIN 81 MG: 81 TABLET, COATED ORAL at 20:53

## 2018-10-03 RX ADMIN — VITAMIN B12 0.1 MG ORAL TABLET 50 MCG: 0.1 TABLET ORAL at 11:01

## 2018-10-03 RX ADMIN — Medication: at 11:05

## 2018-10-03 RX ADMIN — Medication 10 ML: at 20:54

## 2018-10-03 RX ADMIN — OXYBUTYNIN CHLORIDE 5 MG: 5 TABLET ORAL at 20:53

## 2018-10-03 RX ADMIN — ENOXAPARIN SODIUM 50 MG: 60 INJECTION SUBCUTANEOUS at 20:53

## 2018-10-03 RX ADMIN — MULTIPLE VITAMINS W/ MINERALS TAB 1 TABLET: TAB at 11:03

## 2018-10-03 RX ADMIN — CEFTRIAXONE SODIUM 1 G: 10 INJECTION, POWDER, FOR SOLUTION INTRAVENOUS at 06:05

## 2018-10-03 ASSESSMENT — PAIN SCALES - GENERAL
PAINLEVEL_OUTOF10: 0

## 2018-10-03 NOTE — PROGRESS NOTES
(DITROPAN) tablet 5 mg, 5 mg, Oral, BID  levothyroxine (SYNTHROID) tablet 75 mcg, 75 mcg, Oral, QAM AC  metoprolol tartrate (LOPRESSOR) tablet 25 mg, 25 mg, Oral, BID  amitriptyline (ELAVIL) tablet 10 mg, 10 mg, Oral, Nightly  simvastatin (ZOCOR) tablet 10 mg, 10 mg, Oral, Nightly  sodium chloride flush 0.9 % injection 10 mL, 10 mL, Intravenous, 2 times per day  sodium chloride flush 0.9 % injection 10 mL, 10 mL, Intravenous, PRN  magnesium hydroxide (MILK OF MAGNESIA) 400 MG/5ML suspension 30 mL, 30 mL, Oral, Daily PRN  ondansetron (ZOFRAN) injection 4 mg, 4 mg, Intravenous, Q6H PRN  [COMPLETED] diltiazem injection 10 mg, 10 mg, Intravenous, Once **FOLLOWED BY** diltiazem 125 mg in dextrose 5 % 125 mL infusion, 5 mg/hr, Intravenous, Continuous  potassium chloride (KLOR-CON M) extended release tablet 40 mEq, 40 mEq, Oral, PRN **OR** potassium bicarb-citric acid (EFFER-K) effervescent tablet 40 mEq, 40 mEq, Oral, PRN **OR** potassium chloride 10 mEq/100 mL IVPB (Peripheral Line), 10 mEq, Intravenous, PRN  enoxaparin (LOVENOX) injection 50 mg, 1 mg/kg, Subcutaneous, BID  cefTRIAXone (ROCEPHIN) 1 g in sterile water 10 mL IV syringe, 1 g, Intravenous, Q24H  azithromycin (ZITHROMAX) tablet 250 mg, 250 mg, Oral, Nightly         Objective:  /60   Pulse 73   Temp 99 °F (37.2 °C) (Oral)   Resp 16   Ht 5' 5\" (1.651 m)   Wt 105 lb 3.2 oz (47.7 kg)   SpO2 91%   BMI 17.51 kg/m²      Patient Vitals for the past 24 hrs:   BP Temp Temp src Pulse Resp SpO2 Height Weight   10/03/18 0349 122/60 99 °F (37.2 °C) Oral 73 16 91 % - -   10/03/18 0122 - - - 72 16 - - -   10/02/18 2358 125/63 99 °F (37.2 °C) Oral 77 16 90 % - -   10/02/18 2145 (!) 140/51 99.3 °F (37.4 °C) Oral 109 20 92 % - -   10/02/18 1900 (!) 152/64 99.1 °F (37.3 °C) Oral 126 18 92 % 5' 5\" (1.651 m) 105 lb 3.2 oz (47.7 kg)   10/02/18 1800 (!) 138/38 - - 123 27 - - -   10/02/18 1700 (!) 108/44 - - 125 30 95 % - -   10/02/18 1600 (!) 158/63 - - 139 (!) 31 94 % adjustment of the mA/kV was utilized to reduce the radiation dose to as low as reasonably achievable. COMPARISON: Chest radiograph 10/02/2018; thoracic spine radiographs 2008 HISTORY: ORDERING SYSTEM PROVIDED HISTORY: cough, concern for pneumonia TECHNOLOGIST PROVIDED HISTORY: Ordering Physician Provided Reason for Exam: cough, concern for pneumonia Acuity: Unknown Type of Exam: Unknown FINDINGS: Mediastinum: Mild mediastinal lymphadenopathy, for example right tracheobronchial lymph node measuring 12 mm short axis. Limited evaluation of the hilar without intravenous contrast.  Normal caliber great vessels. Moderate atherosclerotic calcifications along the thoracic aorta. Upper normal heart size. No pericardial effusion. Moderate multivessel coronary calcifications unremarkable esophagus per Lungs/pleura: Motion artifact limits detailed evaluation of the pulmonary parenchyma. No pneumothorax. Trace right pleural effusion. Scattered clustered nodularity throughout all 5 lobes with bronchial wall thickening and areas of mucoid impaction and cylindrical bronchiectasis. Lingular atelectasis. Background of centrilobular emphysema. Upper Abdomen: Unremarkable. Soft Tissues/Bones: No enlarged axillary supraclavicular lymph nodes. Bones are demineralized. Numerous compression deformities along the thoracolumbar spine, involving T6, T7, T8, T9, T10, T11, T12, L1, L2 and L3 vertebral bodies. Post kyphoplasty change involving T8 and possibly T6 vertebral bodies. Height loss has increased involving the T12 compression deformity. No suspicious osteolytic or osteoblastic lesion. Multifocal tree-in-bud/clustered nodularity with bronchial wall thickening, mucoid impaction and areas of bronchiectasis. Findings may be seen with chronic recurrent aspiration versus chronic infection such as nontuberculous mycobacterial (MAC) or pseudomonal species.  Osteopenia with numerous thoracolumbar compression deformities, some of which demonstrate progressive height loss since 2008. Xr Chest Portable    Result Date: 10/2/2018  EXAMINATION: SINGLE XRAY VIEW OF THE CHEST 10/2/2018 2:14 pm COMPARISON: None. HISTORY: ORDERING SYSTEM PROVIDED HISTORY: cough TECHNOLOGIST PROVIDED HISTORY: Reason for exam:->cough Ordering Physician Provided Reason for Exam: Atrial Fibrillation (went to MD for cough EKG showed afib with RVR. ) Acuity: Unknown Type of Exam: Unknown FINDINGS: Cardiac silhouette is enlarged. No pneumothorax. No pleural effusion. Hyperinflation. Bronchial wall thickening centrally and in the lung bases. Interstitial prominence in the lower lungs. No acute bony abnormality. Hyperinflation. Bronchial wall thickening as well as interstitial prominence in the lung bases. Findings may be infectious or inflammatory in etiology. Early fibrotic changes also a consideration. No consolidation. Assessment and Plan:  Patient Active Hospital Problem List:   Atrial fibrillation [facility]) (10/2/2018)    Assessment: Established problem. Improving. Now in sinus    Plan: workup in progress. Cards asked to see. ECHO pending   Hypothyroid ()    Assessment: Established problem. Stable. TSH reviewed - 4.62 which is slightly elevated --> subclinical hypothyroid   Plan - cont synthroid at same dose. Given recent bout of AFib, I am ok with this slightly low thyroid level. Recheck in 2-4 wk   Hyperlipidemia LDL goal <130 ()    Assessment: Established problem. Stable. Plan: Continue present orders/plan. Hyponatremia (10/2/2018)    Assessment: Established problem. Improving. Slightly better. 129 this am    Plan: cont ivf. Cont to trend Na levels   Anemia (10/2/2018)    Assessment: Established problem. Worsening. Hgb 10.3 - felt to be dilutional    Plan: No indication for transfusion. Cont to monitor h/h to assess progression of anemia. Recommend ferrous sulfate or MVI as outpatient.     Pneumonia (10/2/2018)    Assessment: Established

## 2018-10-03 NOTE — PROGRESS NOTES
Admit for afib/rvr, weakness, poss pna  See orders    Iv cardizem  Echo  Trend labs    Iv rocephin/zmax  cxr tomorrow    Active Hospital Problems    Diagnosis Date Noted    Hyponatremia [E87.1] 10/02/2018    Anemia [D64.9] 10/02/2018    Atrial fibrillation ([facility] [address] [I48.91] 10/02/2018    Hyperlipidemia LDL goal <130 [E78.5]     Hypothyroid [E03.9]          Full h+p to follow

## 2018-10-04 ENCOUNTER — TELEPHONE (OUTPATIENT)
Dept: PHARMACY | Age: 83
End: 2018-10-04

## 2018-10-04 VITALS
HEART RATE: 80 BPM | TEMPERATURE: 97.2 F | RESPIRATION RATE: 16 BRPM | BODY MASS INDEX: 17.68 KG/M2 | WEIGHT: 106.1 LBS | HEIGHT: 65 IN | SYSTOLIC BLOOD PRESSURE: 158 MMHG | DIASTOLIC BLOOD PRESSURE: 77 MMHG | OXYGEN SATURATION: 96 %

## 2018-10-04 LAB
ANION GAP SERPL CALCULATED.3IONS-SCNC: 11 MMOL/L (ref 3–16)
BASOPHILS ABSOLUTE: 0 K/UL (ref 0–0.2)
BASOPHILS RELATIVE PERCENT: 0.4 %
BUN BLDV-MCNC: 10 MG/DL (ref 7–20)
CALCIUM SERPL-MCNC: 8.3 MG/DL (ref 8.3–10.6)
CHLORIDE BLD-SCNC: 95 MMOL/L (ref 99–110)
CO2: 24 MMOL/L (ref 21–32)
CREAT SERPL-MCNC: 0.6 MG/DL (ref 0.6–1.2)
EOSINOPHILS ABSOLUTE: 0.1 K/UL (ref 0–0.6)
EOSINOPHILS RELATIVE PERCENT: 0.7 %
GFR AFRICAN AMERICAN: >60
GFR NON-AFRICAN AMERICAN: >60
GLUCOSE BLD-MCNC: 95 MG/DL (ref 70–99)
HCT VFR BLD CALC: 29.4 % (ref 36–48)
HEMOGLOBIN: 9.9 G/DL (ref 12–16)
LYMPHOCYTES ABSOLUTE: 1.2 K/UL (ref 1–5.1)
LYMPHOCYTES RELATIVE PERCENT: 15 %
MCH RBC QN AUTO: 31.1 PG (ref 26–34)
MCHC RBC AUTO-ENTMCNC: 33.6 G/DL (ref 31–36)
MCV RBC AUTO: 92.5 FL (ref 80–100)
MONOCYTES ABSOLUTE: 0.7 K/UL (ref 0–1.3)
MONOCYTES RELATIVE PERCENT: 8.6 %
NEUTROPHILS ABSOLUTE: 5.8 K/UL (ref 1.7–7.7)
NEUTROPHILS RELATIVE PERCENT: 75.3 %
PDW BLD-RTO: 14.2 % (ref 12.4–15.4)
PLATELET # BLD: 249 K/UL (ref 135–450)
PMV BLD AUTO: 6.7 FL (ref 5–10.5)
POTASSIUM SERPL-SCNC: 3.7 MMOL/L (ref 3.5–5.1)
RBC # BLD: 3.17 M/UL (ref 4–5.2)
SODIUM BLD-SCNC: 130 MMOL/L (ref 136–145)
WBC # BLD: 7.7 K/UL (ref 4–11)

## 2018-10-04 PROCEDURE — 97166 OT EVAL MOD COMPLEX 45 MIN: CPT

## 2018-10-04 PROCEDURE — G8979 MOBILITY GOAL STATUS: HCPCS

## 2018-10-04 PROCEDURE — 2500000003 HC RX 250 WO HCPCS: Performed by: INTERNAL MEDICINE

## 2018-10-04 PROCEDURE — 36415 COLL VENOUS BLD VENIPUNCTURE: CPT

## 2018-10-04 PROCEDURE — G8987 SELF CARE CURRENT STATUS: HCPCS

## 2018-10-04 PROCEDURE — 85025 COMPLETE CBC W/AUTO DIFF WBC: CPT

## 2018-10-04 PROCEDURE — 97161 PT EVAL LOW COMPLEX 20 MIN: CPT

## 2018-10-04 PROCEDURE — 2580000003 HC RX 258: Performed by: PHYSICIAN ASSISTANT

## 2018-10-04 PROCEDURE — 97530 THERAPEUTIC ACTIVITIES: CPT

## 2018-10-04 PROCEDURE — 6370000000 HC RX 637 (ALT 250 FOR IP): Performed by: INTERNAL MEDICINE

## 2018-10-04 PROCEDURE — 6360000002 HC RX W HCPCS: Performed by: INTERNAL MEDICINE

## 2018-10-04 PROCEDURE — G8988 SELF CARE GOAL STATUS: HCPCS

## 2018-10-04 PROCEDURE — 99232 SBSQ HOSP IP/OBS MODERATE 35: CPT | Performed by: NURSE PRACTITIONER

## 2018-10-04 PROCEDURE — 80048 BASIC METABOLIC PNL TOTAL CA: CPT

## 2018-10-04 PROCEDURE — 2580000003 HC RX 258: Performed by: INTERNAL MEDICINE

## 2018-10-04 PROCEDURE — G8978 MOBILITY CURRENT STATUS: HCPCS

## 2018-10-04 RX ORDER — AMOXICILLIN AND CLAVULANATE POTASSIUM 875; 125 MG/1; MG/1
1 TABLET, FILM COATED ORAL 2 TIMES DAILY
Qty: 14 TABLET | Refills: 0 | Status: SHIPPED | OUTPATIENT
Start: 2018-10-04 | End: 2018-10-11

## 2018-10-04 RX ORDER — WARFARIN SODIUM 2.5 MG/1
2.5 TABLET ORAL DAILY
Qty: 30 TABLET | Refills: 0 | Status: SHIPPED | OUTPATIENT
Start: 2018-10-04 | End: 2019-04-10

## 2018-10-04 RX ORDER — SACCHAROMYCES BOULARDII 250 MG
250 CAPSULE ORAL 2 TIMES DAILY WITH MEALS
Status: DISCONTINUED | OUTPATIENT
Start: 2018-10-04 | End: 2018-10-04 | Stop reason: HOSPADM

## 2018-10-04 RX ORDER — METOPROLOL TARTRATE 37.5 MG/1
37.5 TABLET, FILM COATED ORAL 2 TIMES DAILY
Qty: 60 TABLET | Refills: 2 | Status: ON HOLD | OUTPATIENT
Start: 2018-10-04 | End: 2021-01-01 | Stop reason: HOSPADM

## 2018-10-04 RX ORDER — LOPERAMIDE HYDROCHLORIDE 2 MG/1
2 CAPSULE ORAL 4 TIMES DAILY PRN
Status: DISCONTINUED | OUTPATIENT
Start: 2018-10-04 | End: 2018-10-04 | Stop reason: HOSPADM

## 2018-10-04 RX ADMIN — CEFTRIAXONE SODIUM 1 G: 10 INJECTION, POWDER, FOR SOLUTION INTRAVENOUS at 06:43

## 2018-10-04 RX ADMIN — OXYBUTYNIN CHLORIDE 5 MG: 5 TABLET ORAL at 10:18

## 2018-10-04 RX ADMIN — METOPROLOL TARTRATE 37.5 MG: 25 TABLET ORAL at 10:17

## 2018-10-04 RX ADMIN — LOPERAMIDE HYDROCHLORIDE 2 MG: 2 CAPSULE ORAL at 14:53

## 2018-10-04 RX ADMIN — LEVOTHYROXINE SODIUM 75 MCG: 75 TABLET ORAL at 06:43

## 2018-10-04 RX ADMIN — Medication 10 ML: at 10:19

## 2018-10-04 RX ADMIN — BENZOCAINE, MENTHOL 1 LOZENGE: 15; 3.6 LOZENGE ORAL at 00:08

## 2018-10-04 RX ADMIN — ENOXAPARIN SODIUM 50 MG: 60 INJECTION SUBCUTANEOUS at 10:18

## 2018-10-04 RX ADMIN — MULTIPLE VITAMINS W/ MINERALS TAB 1 TABLET: TAB at 10:18

## 2018-10-04 RX ADMIN — VITAMIN B12 0.1 MG ORAL TABLET 50 MCG: 0.1 TABLET ORAL at 10:18

## 2018-10-04 ASSESSMENT — PAIN SCALES - GENERAL
PAINLEVEL_OUTOF10: 0

## 2018-10-04 NOTE — DISCHARGE SUMMARY
TECHNOLOGIST PROVIDED HISTORY: Ordering Physician Provided Reason for Exam: cough, concern for pneumonia Acuity: Unknown Type of Exam: Unknown FINDINGS: Mediastinum: Mild mediastinal lymphadenopathy, for example right tracheobronchial lymph node measuring 12 mm short axis. Limited evaluation of the hilar without intravenous contrast.  Normal caliber great vessels. Moderate atherosclerotic calcifications along the thoracic aorta. Upper normal heart size. No pericardial effusion. Moderate multivessel coronary calcifications unremarkable esophagus per Lungs/pleura: Motion artifact limits detailed evaluation of the pulmonary parenchyma. No pneumothorax. Trace right pleural effusion. Scattered clustered nodularity throughout all 5 lobes with bronchial wall thickening and areas of mucoid impaction and cylindrical bronchiectasis. Lingular atelectasis. Background of centrilobular emphysema. Upper Abdomen: Unremarkable. Soft Tissues/Bones: No enlarged axillary supraclavicular lymph nodes. Bones are demineralized. Numerous compression deformities along the thoracolumbar spine, involving T6, T7, T8, T9, T10, T11, T12, L1, L2 and L3 vertebral bodies. Post kyphoplasty change involving T8 and possibly T6 vertebral bodies. Height loss has increased involving the T12 compression deformity. No suspicious osteolytic or osteoblastic lesion. Multifocal tree-in-bud/clustered nodularity with bronchial wall thickening, mucoid impaction and areas of bronchiectasis. Findings may be seen with chronic recurrent aspiration versus chronic infection such as nontuberculous mycobacterial (MAC) or pseudomonal species. Osteopenia with numerous thoracolumbar compression deformities, some of which demonstrate progressive height loss since 2008. Xr Chest Portable    Result Date: 10/2/2018  EXAMINATION: SINGLE XRAY VIEW OF THE CHEST 10/2/2018 2:14 pm COMPARISON: None.  HISTORY: ORDERING SYSTEM PROVIDED HISTORY: cough TECHNOLOGIST PROVIDED HISTORY: Reason for exam:->cough Ordering Physician Provided Reason for Exam: Atrial Fibrillation (went to MD for cough EKG showed afib with RVR. ) Acuity: Unknown Type of Exam: Unknown FINDINGS: Cardiac silhouette is enlarged. No pneumothorax. No pleural effusion. Hyperinflation. Bronchial wall thickening centrally and in the lung bases. Interstitial prominence in the lower lungs. No acute bony abnormality. Hyperinflation. Bronchial wall thickening as well as interstitial prominence in the lung bases. Findings may be infectious or inflammatory in etiology. Early fibrotic changes also a consideration. No consolidation. Discharge Exam:  /67   Pulse 89   Temp 98.5 °F (36.9 °C) (Oral)   Resp 16   Ht 5' 5\" (1.651 m)   Wt 106 lb 1.6 oz (48.1 kg)   SpO2 93%   BMI 17.66 kg/m²   General appearance: alert, appears stated age and cooperative  Head: Normocephalic, without obvious abnormality, atraumatic  Lungs: clear to auscultation bilaterally  Heart: regular rate and rhythm, S1, S2 normal, no murmur, click, rub or gallop  Abdomen: soft, non-tender; bowel sounds normal; no masses,  no organomegaly  Extremities: extremities normal, atraumatic, no cyanosis or edema  Pulses: 2+ and symmetric  Skin: Skin color, texture, turgor normal. No rashes or lesions    Disposition: home    Condition: stable    Discharge Medications:   Isidro [address] [place] [address] Medication Instructions DEBRA:[id number]    Printed on:10/04/18 6025   Medication Information                      amitriptyline (ELAVIL) 10 MG tablet  TAKE ONE TABLET BY MOUTH ONCE NIGHTLY             amoxicillin-clavulanate (AUGMENTIN) 875-125 MG per tablet  Take 1 tablet by mouth 2 times daily for 7 days             aspirin 81 MG EC tablet  Take 81 mg by mouth daily.              Calcium Carbonate-Vitamin D (CALTRATE 600+D PO)  Take 1 tablet by mouth daily              levothyroxine (SYNTHROID) 75 MCG tablet  TAKE ONE TABLET BY MOUTH DAILY metoprolol tartrate 37.5 MG TABS  Take 37.5 mg by mouth 2 times daily             multivitamin-iron-minerals-folic acid (CENTRUM) chewable tablet  Take 1 tablet by mouth daily             oxybutynin (DITROPAN) 5 MG tablet  TAKE ONE TABLET BY MOUTH TWICE A DAY             simvastatin (ZOCOR) 20 MG tablet  TAKE ONE- HALF (1/2) TABLET BY MOUTH ONCE NIGHTLY             vitamin B-12 (CYANOCOBALAMIN) 100 MCG tablet  Take 50 mcg by mouth daily                 Allergies: Allergies   Allergen Reactions    Prozac [Fluoxetine Hcl] Nausea Only       Follow up Instructions: Follow-up with PCP: Jamil Troncoso MD in 2-4 wk .       Total time spent on day of discharge including face-to-face visit, examination, documentation, counseling, preparation of discharge plans and followup, and discharge medicine reconciliation and presciptions is 33 minutes    Signed:  Paula Wakefield MD  10/4/2018

## 2018-10-04 NOTE — PROGRESS NOTES
reports that pt had difficulty getting out of bed d/t weakness. Converted to sinus rhythym and feels better. Diagnosed with bronchitis. Vision/Hearing  Vision: Impaired  Vision Exceptions: Wears glasses for reading  Hearing: Exceptions to [facility]  Hearing Exceptions: Hard of hearing/hearing concerns     Subjective  General  Chart Reviewed: Yes  Family / Caregiver Present: Yes (sister)  Diagnosis: Afib   Follows Commands: Within Functional Limits  General Comment  Comments: the pt was supine in bed upon PT arrival, agreeable to PT  Subjective  Subjective: pt denied pain, stated she felt weak after getting up to the bathroom and having diarrhea this morning   Pain Screening  Patient Currently in Pain: Denies  Vital Signs  Patient Currently in Pain: Denies       Orientation  Orientation  Overall Orientation Status: Within Functional Limits    Social/Functional History  Social/Functional History  Lives With: Alone  Type of Home: House  Home Layout: Two level, 1/2 bath on main level, Bed/Bath upstairs (goes upstairs at night to sleep, stays on main level during the day)  Home Access: Stairs to enter without rails  Entrance Stairs - Number of Steps: 2 FELICIA  Bathroom Shower/Tub: Tub/Shower unit  Bathroom Toilet: Standard  Bathroom Equipment: Shower chair (does not use shower chair)  Home Equipment: Rolling walker, Cane, Alert [employer] Help From: Neighbor, Family (nurse, RT and EMT live nearby)  ADL Assistance: Independent  Homemaking Assistance: Independent  Homemaking Responsibilities: Yes  Ambulation Assistance: Independent  Transfer Assistance: Independent  Active : Yes  Leisure & Hobbies: gardening, crossword puzzles   Additional Comments: Daughter lives nearby, has another daughter also. Pt or daughter complete the grocery shopping. One fall six months ago when the pt tripped over a threshold. Pt's one daughter is retired and may stay with pt or have pt return home with her temporarily.

## 2018-10-05 ENCOUNTER — TELEPHONE (OUTPATIENT)
Dept: CARDIOLOGY CLINIC | Age: 83
End: 2018-10-05

## 2018-10-05 NOTE — TELEPHONE ENCOUNTER
Calling to get the referral form that was faxed to [facility] yesterday signed and faxed back. Patient has appt in coumadin clinic on Monday. They need signature and duration on form .

## 2018-10-07 LAB
BLOOD CULTURE, ROUTINE: NORMAL
CULTURE, BLOOD 2: NORMAL

## 2018-10-08 ENCOUNTER — TELEPHONE (OUTPATIENT)
Dept: PHARMACY | Age: 83
End: 2018-10-08

## 2018-10-08 ENCOUNTER — ANTI-COAG VISIT (OUTPATIENT)
Dept: PHARMACY | Age: 83
End: 2018-10-08
Payer: MEDICARE

## 2018-10-08 DIAGNOSIS — I48.91 ATRIAL FIBRILLATION WITH RVR (HCC): ICD-10-CM

## 2018-10-08 LAB — INTERNATIONAL NORMALIZATION RATIO, POC: 1

## 2018-10-08 PROCEDURE — 85610 PROTHROMBIN TIME: CPT

## 2018-10-08 PROCEDURE — 99211 OFF/OP EST MAY X REQ PHY/QHP: CPT

## 2018-10-08 NOTE — TELEPHONE ENCOUNTER
Patient is staying with her daugther who lives very close to [facility] and she would like to transfer appts to that [address].   Faxed signed collaborative to [facility].

## 2018-10-12 ENCOUNTER — ANTI-COAG VISIT (OUTPATIENT)
Dept: PHARMACY | Age: 83
End: 2018-10-12
Payer: MEDICARE

## 2018-10-12 DIAGNOSIS — I48.91 ATRIAL FIBRILLATION WITH RVR (HCC): ICD-10-CM

## 2018-10-12 LAB — INTERNATIONAL NORMALIZATION RATIO, POC: 1.6

## 2018-10-12 PROCEDURE — 99211 OFF/OP EST MAY X REQ PHY/QHP: CPT

## 2018-10-12 PROCEDURE — 85610 PROTHROMBIN TIME: CPT

## 2018-10-17 ENCOUNTER — ANTI-COAG VISIT (OUTPATIENT)
Dept: PHARMACY | Age: 83
End: 2018-10-17
Payer: MEDICARE

## 2018-10-17 ENCOUNTER — TELEPHONE (OUTPATIENT)
Dept: CARDIOLOGY CLINIC | Age: 83
End: 2018-10-17

## 2018-10-17 DIAGNOSIS — I48.91 ATRIAL FIBRILLATION WITH RVR (HCC): ICD-10-CM

## 2018-10-17 LAB — INTERNATIONAL NORMALIZATION RATIO, POC: 1.9

## 2018-10-17 PROCEDURE — 99211 OFF/OP EST MAY X REQ PHY/QHP: CPT

## 2018-10-17 PROCEDURE — 85610 PROTHROMBIN TIME: CPT

## 2018-10-22 ENCOUNTER — TELEPHONE (OUTPATIENT)
Dept: PHARMACY | Age: 83
End: 2018-10-22

## 2018-10-22 NOTE — TELEPHONE ENCOUNTER
Kan Carlin , pts daughter called to inform [facility] that Dr Maria Luisa Pavon has taken pt off of warfarin and started her on Eliquis ( 10/20/18) . Need to confirm.

## 2018-11-06 PROCEDURE — 93228 REMOTE 30 DAY ECG REV/REPORT: CPT | Performed by: INTERNAL MEDICINE

## 2018-11-07 DIAGNOSIS — I48.91 ATRIAL FIBRILLATION WITH RVR (HCC): ICD-10-CM

## 2018-11-20 ENCOUNTER — OFFICE VISIT (OUTPATIENT)
Dept: CARDIOLOGY CLINIC | Age: 83
End: 2018-11-20
Payer: MEDICARE

## 2018-11-20 VITALS
WEIGHT: 106 LBS | DIASTOLIC BLOOD PRESSURE: 72 MMHG | SYSTOLIC BLOOD PRESSURE: 178 MMHG | BODY MASS INDEX: 17.66 KG/M2 | HEART RATE: 64 BPM | HEIGHT: 65 IN

## 2018-11-20 DIAGNOSIS — I48.91 ATRIAL FIBRILLATION WITH RVR (HCC): Primary | ICD-10-CM

## 2018-11-20 DIAGNOSIS — I48.0 PAROXYSMAL ATRIAL FIBRILLATION (HCC): ICD-10-CM

## 2018-11-20 DIAGNOSIS — I10 ESSENTIAL HYPERTENSION: ICD-10-CM

## 2018-11-20 PROCEDURE — 1123F ACP DISCUSS/DSCN MKR DOCD: CPT | Performed by: NURSE PRACTITIONER

## 2018-11-20 PROCEDURE — G8419 CALC BMI OUT NRM PARAM NOF/U: HCPCS | Performed by: NURSE PRACTITIONER

## 2018-11-20 PROCEDURE — 1090F PRES/ABSN URINE INCON ASSESS: CPT | Performed by: NURSE PRACTITIONER

## 2018-11-20 PROCEDURE — G8484 FLU IMMUNIZE NO ADMIN: HCPCS | Performed by: NURSE PRACTITIONER

## 2018-11-20 PROCEDURE — 99214 OFFICE O/P EST MOD 30 MIN: CPT | Performed by: NURSE PRACTITIONER

## 2018-11-20 PROCEDURE — 4040F PNEUMOC VAC/ADMIN/RCVD: CPT | Performed by: NURSE PRACTITIONER

## 2018-11-20 PROCEDURE — G8427 DOCREV CUR MEDS BY ELIG CLIN: HCPCS | Performed by: NURSE PRACTITIONER

## 2018-11-20 PROCEDURE — 1101F PT FALLS ASSESS-DOCD LE1/YR: CPT | Performed by: NURSE PRACTITIONER

## 2018-11-20 PROCEDURE — 1036F TOBACCO NON-USER: CPT | Performed by: NURSE PRACTITIONER

## 2018-11-20 PROCEDURE — 93000 ELECTROCARDIOGRAM COMPLETE: CPT | Performed by: NURSE PRACTITIONER

## 2018-11-20 RX ORDER — LISINOPRIL 5 MG/1
2.5 TABLET ORAL DAILY
Qty: 90 TABLET | Refills: 1 | Status: ON HOLD | OUTPATIENT
Start: 2018-11-20 | End: 2021-01-01 | Stop reason: HOSPADM

## 2018-11-20 NOTE — PROGRESS NOTES
jaundice  · Genito-Urinary:  negative for - Dysuria or incontinence  · Musculoskeletal: negative for - Joint swelling, muscle pain  · Neurological: negative for - confusion, dizziness, headaches   · Psychiatric: No anxiety, no depression. · Dermatological: negative for - rash    Physical Examination:  Vitals:    11/20/18 1316   BP: (!) 178/72   Pulse: 64        · Constitutional: Oriented. No distress. · Head: Normocephalic and atraumatic. · Mouth/Throat: Oropharynx is clear and moist.   · Eyes: Conjunctivae normal. EOM are normal.   · Neck: Neck supple. No rigidity. No JVD present. · Cardiovascular: Normal rate, regular rhythm, S1&S2. · Pulmonary/Chest: Bilateral respiratory sounds. No wheezes, No rhonchi. · Abdominal: Soft. Bowel sounds present. No distension, No tenderness. · Musculoskeletal: No tenderness. No edema    · Lymphadenopathy: Has no cervical adenopathy. · Neurological: Alert and oriented. Cranial nerve appears intact, No Gross deficit   · Skin: Skin is warm and dry. No rash noted. · Psychiatric: Has a normal behavior     Labs, diagnostic and imaging results reviewed. ECG:  Sinus  Rhythm, Left axis -anterior fascicular block. Rate 63    TTE 10/3/18     Summary   -Normal global systolic function with an ejection fraction estimated at   65%. -No regional wall motion abnormalities noted. -Mild left ventricular hypertrophy.   -Aortic valve appears sclerotic but opens adequately.   -Thickened mitral valve without evidence of stenosis. Mild mitral   regurgitation.   -There is moderate tricuspid regurgitation with a RVSP estimation of 68   mmHg. -Grade II diastolic dysfunction with elevated LV filling pressures.    E/e'=16.55   ventricular      Medication:  Current Outpatient Prescriptions   Medication Sig Dispense Refill    KRILL OIL PO Take 1 tablet by mouth daily      apixaban (ELIQUIS) 2.5 MG TABS tablet Take 1 tablet by mouth 2 times daily 180 tablet 3    metoprolol tartrate 37.5 MG TABS Take 37.5 mg by mouth 2 times daily 60 tablet 2    simvastatin (ZOCOR) 20 MG tablet TAKE ONE- HALF (1/2) TABLET BY MOUTH ONCE NIGHTLY 45 tablet 2    oxybutynin (DITROPAN) 5 MG tablet TAKE ONE TABLET BY MOUTH TWICE A DAY (Patient taking differently: TAKE ONE TABLET BY MOUTH DAILY) 180 tablet 1    vitamin B-12 (CYANOCOBALAMIN) 100 MCG tablet Take 50 mcg by mouth daily      Calcium Carbonate-Vitamin D (CALTRATE 600+D PO) Take 1 tablet by mouth daily       multivitamin-iron-minerals-folic acid (CENTRUM) chewable tablet Take 1 tablet by mouth daily      enoxaparin (LOVENOX) 60 MG/0.6ML injection Inject 0.5 mLs into the skin 2 times daily 14 Syringe 0    warfarin (COUMADIN) 2.5 MG tablet Take 1 tablet by mouth daily 30 tablet 0    amitriptyline (ELAVIL) 10 MG tablet TAKE ONE TABLET BY MOUTH ONCE NIGHTLY 90 tablet 2    levothyroxine (SYNTHROID) 75 MCG tablet TAKE ONE TABLET BY MOUTH DAILY 90 tablet 1    aspirin 81 MG EC tablet Take 81 mg by mouth daily. No current facility-administered medications for this visit. Assessment and plan:     Paroxysmal atrial fibrillation  -New finding during hospital admission. It may be due to acute illness but I think more likely a separate issue.   -Given her [id number] score of 3, in the absence of any contraindication such as risk of fall, I would start her on anticoagulation, any DOAC  [facility] for further rate control in the future  -Event monitor at discharge to assess for burden of Atrial fibrillation-->SR/SB w/ PAT  -EKG today SR rate 63  -Continue OAC and BB    HTN  BP Readings from Last 3 Encounters:   12/07/18 (!) 150/75   11/20/18 (!) 178/72   10/04/18 (!) 158/77   -Continue BB, no change since petr at times  -Start low dose ACE and up-titrate as needed  -Has f/up w/pcp -can repeat bmp and uptitrate ACE there    HLD        Lab Results   Component Value Date     LDLCALC 76 02/03/2017   -On statin     Hypothyroidism  -Continue thyroid replacement. Management no different than patiets without Atrial fibrillation          1) Start lisinopril 2.5 mg    2) Check bp at home and bring log to PCP follow up    3) Change position slowly    4) Continue Eliquis 2.5 mg bid    5) Follow up with Dr. Grace Bowles in 3 months      I independently reviewed MCOT     Thank you for allowing me to participate in the care of Melodie English. Further evaluation will be based upon the patient's clinical course and testing results. All questions and concerns were addressed to the patient/family. Alternatives to my treatment were discussed. I have discussed the above stated plan and the patient verbalized understanding and agreed with the plan. NOTE: This report was transcribed using voice recognition software. Every effort was made to ensure accuracy, however, inadvertent computerized transcription errors may be present.        DE Palacios  [address]   Office: [phone]

## 2018-11-30 DIAGNOSIS — M81.0 AGE-RELATED OSTEOPOROSIS WITHOUT CURRENT PATHOLOGICAL FRACTURE: Primary | ICD-10-CM

## 2018-11-30 RX ORDER — ZOLEDRONIC ACID 5 MG/100ML
5 INJECTION, SOLUTION INTRAVENOUS ONCE
Qty: 100 ML | Refills: 0 | Status: ON HOLD | OUTPATIENT
Start: 2018-11-30 | End: 2021-01-01

## 2018-12-05 PROBLEM — M81.0 SENILE OSTEOPOROSIS: Status: ACTIVE | Noted: 2018-12-05

## 2018-12-06 ENCOUNTER — ANTI-COAG VISIT (OUTPATIENT)
Dept: PHARMACY | Age: 83
End: 2018-12-06

## 2018-12-06 PROBLEM — I48.91 ATRIAL FIBRILLATION WITH RVR (HCC): Status: RESOLVED | Noted: 2018-10-02 | Resolved: 2018-11-30

## 2018-12-07 ENCOUNTER — HOSPITAL ENCOUNTER (OUTPATIENT)
Dept: ONCOLOGY | Age: 83
Setting detail: INFUSION SERIES
Discharge: HOME | End: 2018-12-07
Payer: MEDICARE

## 2018-12-07 VITALS — DIASTOLIC BLOOD PRESSURE: 75 MMHG | HEART RATE: 68 BPM | SYSTOLIC BLOOD PRESSURE: 150 MMHG | TEMPERATURE: 97.2 F

## 2018-12-07 DIAGNOSIS — M81.0 SENILE OSTEOPOROSIS: ICD-10-CM

## 2018-12-07 PROCEDURE — 6360000002 HC RX W HCPCS: Performed by: FAMILY MEDICINE

## 2018-12-07 PROCEDURE — 96365 THER/PROPH/DIAG IV INF INIT: CPT

## 2018-12-07 RX ORDER — SODIUM CHLORIDE 0.9 % (FLUSH) 0.9 %
10 SYRINGE (ML) INJECTION PRN
Status: CANCELLED | OUTPATIENT
Start: 2018-12-07

## 2018-12-07 RX ORDER — ZOLEDRONIC ACID 5 MG/100ML
5 INJECTION, SOLUTION INTRAVENOUS ONCE
Status: COMPLETED | OUTPATIENT
Start: 2018-12-07 | End: 2018-12-07

## 2018-12-07 RX ORDER — SODIUM CHLORIDE 0.9 % (FLUSH) 0.9 %
10 SYRINGE (ML) INJECTION PRN
Status: DISCONTINUED | OUTPATIENT
Start: 2018-12-07 | End: 2018-12-08 | Stop reason: HOSPADM

## 2018-12-07 RX ORDER — ZOLEDRONIC ACID 5 MG/100ML
5 INJECTION, SOLUTION INTRAVENOUS ONCE
Status: CANCELLED | OUTPATIENT
Start: 2018-12-07 | End: 2018-12-07

## 2018-12-07 RX ADMIN — ZOLEDRONIC ACID 5 MG: 0.05 INJECTION, SOLUTION INTRAVENOUS at 15:26

## 2018-12-07 NOTE — PROGRESS NOTES
To clinic for reclast infusion, tolerated well. Reviewed information about reclast with Pt., VU all questions answered. To F/U with Dr. Corey Astorga' office.

## 2018-12-18 PROBLEM — I10 ESSENTIAL HYPERTENSION: Status: ACTIVE | Noted: 2018-12-18

## 2018-12-18 PROBLEM — I48.0 PAROXYSMAL ATRIAL FIBRILLATION (HCC): Status: ACTIVE | Noted: 2018-12-18

## 2019-02-20 ENCOUNTER — OFFICE VISIT (OUTPATIENT)
Dept: CARDIOLOGY CLINIC | Age: 84
End: 2019-02-20
Payer: MEDICARE

## 2019-02-20 VITALS
DIASTOLIC BLOOD PRESSURE: 68 MMHG | BODY MASS INDEX: 18.46 KG/M2 | SYSTOLIC BLOOD PRESSURE: 139 MMHG | HEART RATE: 71 BPM | WEIGHT: 110.8 LBS | HEIGHT: 65 IN

## 2019-02-20 DIAGNOSIS — I48.0 PAROXYSMAL ATRIAL FIBRILLATION (HCC): Primary | ICD-10-CM

## 2019-02-20 DIAGNOSIS — E78.2 MIXED HYPERLIPIDEMIA: ICD-10-CM

## 2019-02-20 DIAGNOSIS — I10 BENIGN ESSENTIAL HTN: ICD-10-CM

## 2019-02-20 DIAGNOSIS — E03.9 ACQUIRED HYPOTHYROIDISM: ICD-10-CM

## 2019-02-20 PROCEDURE — 1036F TOBACCO NON-USER: CPT | Performed by: INTERNAL MEDICINE

## 2019-02-20 PROCEDURE — 4040F PNEUMOC VAC/ADMIN/RCVD: CPT | Performed by: INTERNAL MEDICINE

## 2019-02-20 PROCEDURE — 93000 ELECTROCARDIOGRAM COMPLETE: CPT | Performed by: INTERNAL MEDICINE

## 2019-02-20 PROCEDURE — 99214 OFFICE O/P EST MOD 30 MIN: CPT | Performed by: INTERNAL MEDICINE

## 2019-02-20 PROCEDURE — 1101F PT FALLS ASSESS-DOCD LE1/YR: CPT | Performed by: INTERNAL MEDICINE

## 2019-02-20 PROCEDURE — 1090F PRES/ABSN URINE INCON ASSESS: CPT | Performed by: INTERNAL MEDICINE

## 2019-02-20 PROCEDURE — G8419 CALC BMI OUT NRM PARAM NOF/U: HCPCS | Performed by: INTERNAL MEDICINE

## 2019-02-20 PROCEDURE — G8484 FLU IMMUNIZE NO ADMIN: HCPCS | Performed by: INTERNAL MEDICINE

## 2019-02-20 PROCEDURE — 1123F ACP DISCUSS/DSCN MKR DOCD: CPT | Performed by: INTERNAL MEDICINE

## 2019-02-20 PROCEDURE — G8427 DOCREV CUR MEDS BY ELIG CLIN: HCPCS | Performed by: INTERNAL MEDICINE

## 2019-03-04 ENCOUNTER — TELEPHONE (OUTPATIENT)
Dept: CARDIOLOGY CLINIC | Age: 84
End: 2019-03-04

## 2019-04-03 ENCOUNTER — HOSPITAL ENCOUNTER (OUTPATIENT)
Dept: CARDIAC CATH/INVASIVE PROCEDURES | Age: 84
Discharge: HOME | End: 2019-04-03
Attending: INTERNAL MEDICINE | Admitting: INTERNAL MEDICINE
Payer: MEDICARE

## 2019-04-03 PROCEDURE — 33285 INSJ SUBQ CAR RHYTHM MNTR: CPT

## 2019-04-03 PROCEDURE — 33285 INSJ SUBQ CAR RHYTHM MNTR: CPT | Performed by: INTERNAL MEDICINE

## 2019-04-03 PROCEDURE — C1764 EVENT RECORDER, CARDIAC: HCPCS

## 2019-04-03 NOTE — PROCEDURES
[address]     Electrophysiology Procedure Note       Date of Procedure: 4/3/2019  Patient's Name: Hunter Felix  YOB: 1931   Medical Record Number: [id number]  Procedure Performed by: Rodger Finney MD    Procedures performed:    · Loop recorder implantation    Indication of the procedure: paroxysmal atrial fibrillation , Palpitation   Hunter Felix is a 80 y.o. female with   paroxysmal atrial fibrillation       Details of procedure:   Procedure's risks, benefits and alternatives of procedure were explained to patient. All questions were answered. Patient understood and informed consent was obtained. The patient was brought to the electrophysiology lab in a fasting nonsedated state. Patient was prepped and draped in usual sterile fashion. After injection of 2% lidocaine in the left 4th intercostal area, a 5 mm small incision was made. Using ILR insertion device, a small subcutaneous tunnel was created and the loop recorder was inserted under skin. The skin was covered with Steri-Strips. Device information:   The device is Reveal LINQ SN# [id number] [employer] Implant date: 4/3/2019  R wave 0.41mv  The device was programmed to detect:   VT: 380 ms 16 beats   Bradycardia: 2000 ms     Plan:   The patient will have usual post-implant care. Patient can be discharged home if remains stable with follow up in arrhythmia clinic.

## 2019-04-03 NOTE — H&P
[address]   Electrophysiology   Date: 4/3/2019  Reason for Consultation:   Consult Requesting Physician: Marquis Mccoy MD      No chief complaint on file. HPI: Nena Felix is a 80 y.o.  female admitted to the hospital 10/2/18 with 1 week of weakness, shortness of breath, cough, an episode of diarrhea and was noted to be in Atrial fibrillation and rapid ventricular response. She converted overnight to sinus. She had no past medical history of afib. Today she says she is feeling good from a cardiac standpoint. Patient denies lightheadedness, dizziness, chest pain, palpitations, orthopnea, edema, presyncope or syncope. Past Medical History:   Diagnosis Date    Carpal tunnel syndrome of left wrist     Chronic low back pain     Depression     [place] (hard of hearing)     Hyperlipidemia LDL goal <130     Hypothyroid     Insomnia     Osteoporosis     Urinary incontinence         Past Surgical History:   Procedure Laterality Date    BACK SURGERY         Allergies: Allergies   Allergen Reactions    Prozac [Fluoxetine Hcl] Nausea Only       Social History:   reports that she has quit smoking. Her smoking use included cigarettes. She has a 2.50 pack-year smoking history. She has never used smokeless tobacco. She reports that she drinks about 0.6 oz of alcohol per week. She reports that she does not use drugs. Family History:  family history includes Heart Disease in her father. Reviewed. Denies family history of sudden cardiac death, arrhythmia, premature CAD    Review of System:  All other systems reviewed and are negative except for that noted above.  Pertinent negatives are:   General: negative for fever, chills   Ophthalmic ROS: negative for - eye pain or loss of vision  ENT ROS: negative for - headaches, sore throat   Respiratory: negative for - cough, sputum  Cardiovascular: Reviewed in HPI  Gastrointestinal: negative for - abdominal pain, diarrhea, N/V  Hematology: negative for - bleeding, blood clots, bruising or jaundice  Genito-Urinary:  negative for - Dysuria or incontinence  Musculoskeletal: negative for - Joint swelling, muscle pain  Neurological: negative for - confusion, dizziness, headaches   Psychiatric: No anxiety, no depression. Dermatological: negative for - rash    Physical Examination:  There were no vitals filed for this visit. Wt Readings from Last 3 Encounters:   19 110 lb 12.8 oz (50.3 kg)   18 106 lb (48.1 kg)   10/04/18 106 lb 1.6 oz (48.1 kg)       · Constitutional: Oriented. No distress. · Head: Normocephalic and atraumatic. · Mouth/Throat: Oropharynx is clear and moist.   · Eyes: Conjunctivae normal. EOM are normal.   · Neck: Neck supple. No rigidity. No JVD present. · Cardiovascular: Normal rate, regular rhythm, S1&S2. · Pulmonary/Chest: Bilateral respiratory sounds. No wheezes, No rhonchi. · Abdominal: Soft. Bowel sounds present. No distension, No tenderness. · Musculoskeletal: No tenderness. No edema    · Lymphadenopathy: Has no cervical adenopathy. · Neurological: Alert and oriented. Cranial nerve appears intact, No Gross deficit   · Skin: Skin is warm and dry. No rash noted. · Psychiatric: Has a normal behavior       Labs, diagnostic and imaging results reviewed. Reviewed. Lab Results   Component Value Date    TSHREFLEX 3.74 10/02/2018    TSH 4.62 10/02/2018    TSH 0.31 2017    CREATININE 0.6 10/04/2018    CREATININE 0.6 10/03/2018    AST 22 10/02/2018    ALT 9 10/02/2018       EC19  Sinus Shaheen   QTc 425    Echo: 10/3/18   Summary   -Normal global systolic function with an ejection fraction estimated at   65%.  -No regional wall motion abnormalities noted.   -Mild left ventricular hypertrophy.   -Aortic valve appears sclerotic but opens adequately.   -Thickened mitral valve without evidence of stenosis.  Mild mitral   regurgitation.   -There is moderate tricuspid regurgitation with a RVSP continuation of anticoagulation     -HTN     There were no vitals filed for this visit. Well controlled     -HLD              On statin     - Hypothyroidism              Continue thyroid replacement. Management no different than patiets without Atrial fibrillation       Plan:  Loop recorder      NOTE: This report was transcribed using voice recognition software. Every effort was made to ensure accuracy, however, inadvertent computerized transcription errors may be present.        Jay Beltre MD, [facility]   Office: [phone]

## 2019-04-10 ENCOUNTER — PROCEDURE VISIT (OUTPATIENT)
Dept: CARDIOLOGY CLINIC | Age: 84
End: 2019-04-10
Payer: MEDICARE

## 2019-04-10 ENCOUNTER — TELEPHONE (OUTPATIENT)
Dept: CARDIOLOGY CLINIC | Age: 84
End: 2019-04-10

## 2019-04-10 DIAGNOSIS — E03.9 ACQUIRED HYPOTHYROIDISM: ICD-10-CM

## 2019-04-10 DIAGNOSIS — I48.0 PAROXYSMAL ATRIAL FIBRILLATION (HCC): ICD-10-CM

## 2019-04-10 DIAGNOSIS — Z45.09 ENCOUNTER FOR ELECTRONIC ANALYSIS OF REVEAL EVENT RECORDER: Primary | ICD-10-CM

## 2019-04-10 DIAGNOSIS — N39.45 CONTINUOUS LEAKAGE OF URINE: ICD-10-CM

## 2019-04-10 DIAGNOSIS — F34.1 DYSTHYMIA: ICD-10-CM

## 2019-04-10 PROCEDURE — 93291 INTERROG DEV EVAL SCRMS IP: CPT | Performed by: INTERNAL MEDICINE

## 2019-04-10 RX ORDER — AMITRIPTYLINE HYDROCHLORIDE 10 MG/1
TABLET, FILM COATED ORAL
Qty: 90 TABLET | Refills: 2 | Status: SHIPPED | OUTPATIENT
Start: 2019-04-10

## 2019-04-10 RX ORDER — LEVOTHYROXINE SODIUM 0.2 MG/1
200 TABLET ORAL DAILY
Status: ON HOLD | COMMUNITY
End: 2021-01-01 | Stop reason: HOSPADM

## 2019-04-10 RX ORDER — LEVOTHYROXINE SODIUM 88 UG/1
88 TABLET ORAL DAILY
Status: CANCELLED | OUTPATIENT
Start: 2019-04-10

## 2019-04-10 RX ORDER — OXYBUTYNIN CHLORIDE 5 MG/1
TABLET ORAL
Qty: 180 TABLET | Refills: 1 | Status: CANCELLED | OUTPATIENT
Start: 2019-04-10

## 2019-04-10 NOTE — PROGRESS NOTES
Patient comes in for interrogation of her implanted loop recorder. Her incision is healing nicely. Interrogation shows no AF. Noted over sensing. Pt will continue Eliquis. We will follow her remotely.

## 2019-05-14 ENCOUNTER — NURSE ONLY (OUTPATIENT)
Dept: CARDIOLOGY CLINIC | Age: 84
End: 2019-05-14
Payer: MEDICARE

## 2019-05-14 DIAGNOSIS — I48.0 PAROXYSMAL ATRIAL FIBRILLATION (HCC): ICD-10-CM

## 2019-05-14 DIAGNOSIS — Z45.09 ENCOUNTER FOR ELECTRONIC ANALYSIS OF REVEAL EVENT RECORDER: ICD-10-CM

## 2019-05-14 PROCEDURE — 93298 REM INTERROG DEV EVAL SCRMS: CPT | Performed by: INTERNAL MEDICINE

## 2019-05-14 PROCEDURE — 93299 PR REM INTERROG ICPMS/SCRMS <30 D TECH REVIEW: CPT | Performed by: INTERNAL MEDICINE

## 2019-05-14 NOTE — LETTER
[address] [phone]  [address] [facility] [phone]    Pacemaker/Defibrillator Clinic          05/14/19        [address] [place] [phone]        Dear Cory Felix    This letter is to inform you that we received the transmission from your monitor at home that checks your pacemaker and/or defibrillator, or implanted heart monitor. Your report shows no abnormal rhythms. The next date your monitor will automatically transmit will be 6/25/19. Your device and monitor are wireless and most transmit cellularly, but please periodically check your monitor is still plugged in to the electrical outlet. If you still use the telephone land line to send please ensure the connection to the phone david is secure. This will help to ensure successful automatic transmissions in the future. Also, the monitor needs to be close to you while sleeping at night. Please be aware that the remote device transmission sites are periodically monitored only during regular business hours during which simultaneous in-office device clinics are being run. If your transmission requires attention, we will contact you as soon as possible. Thank you.             [address]

## 2019-06-25 ENCOUNTER — NURSE ONLY (OUTPATIENT)
Dept: CARDIOLOGY CLINIC | Age: 84
End: 2019-06-25
Payer: MEDICARE

## 2019-06-25 DIAGNOSIS — I48.0 PAROXYSMAL ATRIAL FIBRILLATION (HCC): ICD-10-CM

## 2019-06-25 DIAGNOSIS — Z45.09 ENCOUNTER FOR ELECTRONIC ANALYSIS OF REVEAL EVENT RECORDER: ICD-10-CM

## 2019-06-25 PROCEDURE — 93298 REM INTERROG DEV EVAL SCRMS: CPT | Performed by: INTERNAL MEDICINE

## 2019-06-25 PROCEDURE — 93299 PR REM INTERROG ICPMS/SCRMS <30 D TECH REVIEW: CPT | Performed by: INTERNAL MEDICINE

## 2019-06-25 NOTE — LETTER
[address] [phone]  [address] [facility] [phone]    Pacemaker/Defibrillator Clinic          06/27/19        [address] [place] [phone]        Dear Liz Felix    This letter is to inform you that we received the transmission from your monitor at home that checks your pacemaker and/or defibrillator, or implanted heart monitor. Your report shows no abnormal rhythms. The next date your monitor will automatically transmit will be 8/13/19. Your device and monitor are wireless and most transmit cellularly, but please periodically check your monitor is still plugged in to the electrical outlet. If you still use the telephone land line to send please ensure the connection to the phone david is secure. This will help to ensure successful automatic transmissions in the future. Also, the monitor needs to be close to you while sleeping at night. Please be aware that the remote device transmission sites are periodically monitored only during regular business hours during which simultaneous in-office device clinics are being run. If your transmission requires attention, we will contact you as soon as possible. Thank you.             [facility]

## 2019-06-27 NOTE — PROGRESS NOTES
Carelink/loop recorder transmission shows no AF recordings. Shows what appears to be over sensing. Implanted for AF management. See PACEART report under Cardiology tab.

## 2019-08-13 ENCOUNTER — NURSE ONLY (OUTPATIENT)
Dept: CARDIOLOGY CLINIC | Age: 84
End: 2019-08-13
Payer: MEDICARE

## 2019-08-13 DIAGNOSIS — Z45.09 ENCOUNTER FOR ELECTRONIC ANALYSIS OF REVEAL EVENT RECORDER: ICD-10-CM

## 2019-08-13 DIAGNOSIS — I48.0 PAROXYSMAL ATRIAL FIBRILLATION (HCC): ICD-10-CM

## 2019-08-13 PROCEDURE — 93298 REM INTERROG DEV EVAL SCRMS: CPT | Performed by: INTERNAL MEDICINE

## 2019-08-13 PROCEDURE — 93299 PR REM INTERROG ICPMS/SCRMS <30 D TECH REVIEW: CPT | Performed by: INTERNAL MEDICINE

## 2019-09-24 ENCOUNTER — NURSE ONLY (OUTPATIENT)
Dept: CARDIOLOGY CLINIC | Age: 84
End: 2019-09-24
Payer: MEDICARE

## 2019-09-24 DIAGNOSIS — I48.0 PAROXYSMAL ATRIAL FIBRILLATION (HCC): ICD-10-CM

## 2019-09-24 DIAGNOSIS — Z45.09 ENCOUNTER FOR ELECTRONIC ANALYSIS OF REVEAL EVENT RECORDER: ICD-10-CM

## 2019-09-24 PROCEDURE — 93298 REM INTERROG DEV EVAL SCRMS: CPT | Performed by: INTERNAL MEDICINE

## 2019-09-24 PROCEDURE — 93299 PR REM INTERROG ICPMS/SCRMS <30 D TECH REVIEW: CPT | Performed by: INTERNAL MEDICINE

## 2019-09-24 NOTE — PROGRESS NOTES
Carelink/loop recorder transmission shows no arrhythmia recordings. Tachy recordings noted appear to be oversensing. Will have EP review for confirmation. Implanted for AF w/ rvr management. Taking Eliquis. See PACEART report under Cardiology tab.

## 2019-11-12 ENCOUNTER — NURSE ONLY (OUTPATIENT)
Dept: CARDIOLOGY CLINIC | Age: 84
End: 2019-11-12
Payer: MEDICARE

## 2019-11-12 DIAGNOSIS — Z45.09 ENCOUNTER FOR ELECTRONIC ANALYSIS OF REVEAL EVENT RECORDER: ICD-10-CM

## 2019-11-12 DIAGNOSIS — I48.0 PAROXYSMAL ATRIAL FIBRILLATION (HCC): ICD-10-CM

## 2019-11-12 PROCEDURE — 93298 REM INTERROG DEV EVAL SCRMS: CPT | Performed by: INTERNAL MEDICINE

## 2019-11-12 PROCEDURE — 93299 PR REM INTERROG ICPMS/SCRMS <30 D TECH REVIEW: CPT | Performed by: INTERNAL MEDICINE

## 2019-12-17 ENCOUNTER — NURSE ONLY (OUTPATIENT)
Dept: CARDIOLOGY CLINIC | Age: 84
End: 2019-12-17
Payer: MEDICARE

## 2019-12-17 DIAGNOSIS — Z45.09 ENCOUNTER FOR ELECTRONIC ANALYSIS OF REVEAL EVENT RECORDER: ICD-10-CM

## 2019-12-17 DIAGNOSIS — I48.0 PAROXYSMAL ATRIAL FIBRILLATION (HCC): ICD-10-CM

## 2019-12-17 PROCEDURE — 93299 PR REM INTERROG ICPMS/SCRMS <30 D TECH REVIEW: CPT | Performed by: INTERNAL MEDICINE

## 2019-12-17 PROCEDURE — 93298 REM INTERROG DEV EVAL SCRMS: CPT | Performed by: INTERNAL MEDICINE

## 2020-01-01 ENCOUNTER — NURSE ONLY (OUTPATIENT)
Dept: CARDIOLOGY CLINIC | Age: 85
End: 2020-01-01
Payer: MEDICARE

## 2020-01-01 PROCEDURE — G2066 INTER DEVC REMOTE 30D: HCPCS | Performed by: INTERNAL MEDICINE

## 2020-01-01 PROCEDURE — 93298 REM INTERROG DEV EVAL SCRMS: CPT | Performed by: INTERNAL MEDICINE

## 2020-01-21 ENCOUNTER — NURSE ONLY (OUTPATIENT)
Dept: CARDIOLOGY CLINIC | Age: 85
End: 2020-01-21
Payer: MEDICARE

## 2020-01-21 PROCEDURE — G2066 INTER DEVC REMOTE 30D: HCPCS | Performed by: INTERNAL MEDICINE

## 2020-01-21 PROCEDURE — 93298 REM INTERROG DEV EVAL SCRMS: CPT | Performed by: INTERNAL MEDICINE

## 2020-01-21 NOTE — LETTER
[address] [facility] [phone]  [facility] [address] [facility] [phone]    Pacemaker/Defibrillator Clinic          01/20/20        [address] [place] [phone]        Dear Saida Felix    This letter is to inform you that we received the transmission from your monitor at home that checks your implanted heart device. The next date your monitor will automatically transmit will be 3-9-20. If your report needs attention we will notify you. Your device and monitor are wireless and most transmit cellularly, but please periodically check your monitor is still plugged in to the electrical outlet. If you still use the telephone land line to send please ensure the connection to the phone david is secure. This will help to ensure successful automatic transmissions in the future. Also, the monitor needs to be close to you while sleeping at night. Please be aware that the remote device transmission sites are periodically monitored only during regular business hours during which simultaneous in-office device clinics are being run. If your transmission requires attention, we will contact you as soon as possible. Thank you.             [facility]

## 2020-01-25 ENCOUNTER — HOSPITAL ENCOUNTER (INPATIENT)
Age: 85
LOS: 3 days | Discharge: NURSING, REHAB OR LONG-TERM CARE | DRG: 481 | End: 2020-01-28
Attending: EMERGENCY MEDICINE | Admitting: ORTHOPAEDIC SURGERY
Payer: MEDICARE

## 2020-01-25 ENCOUNTER — APPOINTMENT (OUTPATIENT)
Dept: GENERAL RADIOLOGY | Age: 85
DRG: 481 | End: 2020-01-25
Payer: MEDICARE

## 2020-01-25 PROBLEM — S72.045K: Status: ACTIVE | Noted: 2020-01-25

## 2020-01-25 PROBLEM — S72.002A HIP FRACTURE REQUIRING OPERATIVE REPAIR, LEFT, CLOSED, INITIAL ENCOUNTER (HCC): Status: ACTIVE | Noted: 2020-01-25

## 2020-01-25 LAB
A/G RATIO: 1.6 (ref 1.1–2.2)
ALBUMIN SERPL-MCNC: 3.3 G/DL (ref 3.4–5)
ALP BLD-CCNC: 37 U/L (ref 40–129)
ALT SERPL-CCNC: 13 U/L (ref 10–40)
ANION GAP SERPL CALCULATED.3IONS-SCNC: 11 MMOL/L (ref 3–16)
APTT: 30.8 SEC (ref 24.2–36.2)
AST SERPL-CCNC: 29 U/L (ref 15–37)
BASOPHILS ABSOLUTE: 0 K/UL (ref 0–0.2)
BASOPHILS RELATIVE PERCENT: 0.2 %
BILIRUB SERPL-MCNC: 0.6 MG/DL (ref 0–1)
BUN BLDV-MCNC: 17 MG/DL (ref 7–20)
CALCIUM SERPL-MCNC: 7 MG/DL (ref 8.3–10.6)
CHLORIDE BLD-SCNC: 110 MMOL/L (ref 99–110)
CO2: 18 MMOL/L (ref 21–32)
CREAT SERPL-MCNC: 0.7 MG/DL (ref 0.6–1.2)
EOSINOPHILS ABSOLUTE: 0 K/UL (ref 0–0.6)
EOSINOPHILS RELATIVE PERCENT: 0 %
GFR AFRICAN AMERICAN: >60
GFR NON-AFRICAN AMERICAN: >60
GLOBULIN: 2.1 G/DL
GLUCOSE BLD-MCNC: 73 MG/DL (ref 70–99)
HCT VFR BLD CALC: 32 % (ref 36–48)
HEMOGLOBIN: 10.5 G/DL (ref 12–16)
INR BLD: 1.13 (ref 0.86–1.14)
LYMPHOCYTES ABSOLUTE: 1.1 K/UL (ref 1–5.1)
LYMPHOCYTES RELATIVE PERCENT: 6.7 %
MCH RBC QN AUTO: 31.3 PG (ref 26–34)
MCHC RBC AUTO-ENTMCNC: 32.8 G/DL (ref 31–36)
MCV RBC AUTO: 95.4 FL (ref 80–100)
MONOCYTES ABSOLUTE: 0.4 K/UL (ref 0–1.3)
MONOCYTES RELATIVE PERCENT: 2.7 %
NEUTROPHILS ABSOLUTE: 14.7 K/UL (ref 1.7–7.7)
NEUTROPHILS RELATIVE PERCENT: 90.4 %
PDW BLD-RTO: 15.3 % (ref 12.4–15.4)
PLATELET # BLD: 270 K/UL (ref 135–450)
PMV BLD AUTO: 7.5 FL (ref 5–10.5)
POTASSIUM SERPL-SCNC: 4 MMOL/L (ref 3.5–5.1)
PRO-BNP: 2797 PG/ML (ref 0–449)
PROTHROMBIN TIME: 13.1 SEC (ref 10–13.2)
RBC # BLD: 3.35 M/UL (ref 4–5.2)
REASON FOR REJECTION: NORMAL
REJECTED TEST: NORMAL
SODIUM BLD-SCNC: 139 MMOL/L (ref 136–145)
TOTAL PROTEIN: 5.4 G/DL (ref 6.4–8.2)
TROPONIN: <0.01 NG/ML
WBC # BLD: 16.3 K/UL (ref 4–11)

## 2020-01-25 PROCEDURE — 2700000000 HC OXYGEN THERAPY PER DAY

## 2020-01-25 PROCEDURE — 84484 ASSAY OF TROPONIN QUANT: CPT

## 2020-01-25 PROCEDURE — 6360000002 HC RX W HCPCS: Performed by: INTERNAL MEDICINE

## 2020-01-25 PROCEDURE — 80053 COMPREHEN METABOLIC PANEL: CPT

## 2020-01-25 PROCEDURE — 85025 COMPLETE CBC W/AUTO DIFF WBC: CPT

## 2020-01-25 PROCEDURE — 6370000000 HC RX 637 (ALT 250 FOR IP): Performed by: INTERNAL MEDICINE

## 2020-01-25 PROCEDURE — 94761 N-INVAS EAR/PLS OXIMETRY MLT: CPT

## 2020-01-25 PROCEDURE — 85730 THROMBOPLASTIN TIME PARTIAL: CPT

## 2020-01-25 PROCEDURE — 85610 PROTHROMBIN TIME: CPT

## 2020-01-25 PROCEDURE — 6370000000 HC RX 637 (ALT 250 FOR IP): Performed by: PHYSICIAN ASSISTANT

## 2020-01-25 PROCEDURE — 2580000003 HC RX 258: Performed by: INTERNAL MEDICINE

## 2020-01-25 PROCEDURE — 6360000002 HC RX W HCPCS: Performed by: EMERGENCY MEDICINE

## 2020-01-25 PROCEDURE — 71045 X-RAY EXAM CHEST 1 VIEW: CPT

## 2020-01-25 PROCEDURE — 36415 COLL VENOUS BLD VENIPUNCTURE: CPT

## 2020-01-25 PROCEDURE — 73502 X-RAY EXAM HIP UNI 2-3 VIEWS: CPT

## 2020-01-25 PROCEDURE — 51702 INSERT TEMP BLADDER CATH: CPT

## 2020-01-25 PROCEDURE — 83880 ASSAY OF NATRIURETIC PEPTIDE: CPT

## 2020-01-25 PROCEDURE — 1200000000 HC SEMI PRIVATE

## 2020-01-25 PROCEDURE — 93005 ELECTROCARDIOGRAM TRACING: CPT | Performed by: PHYSICIAN ASSISTANT

## 2020-01-25 PROCEDURE — 99285 EMERGENCY DEPT VISIT HI MDM: CPT

## 2020-01-25 PROCEDURE — 6360000002 HC RX W HCPCS: Performed by: PHYSICIAN ASSISTANT

## 2020-01-25 PROCEDURE — 94640 AIRWAY INHALATION TREATMENT: CPT

## 2020-01-25 RX ORDER — SODIUM CHLORIDE 9 MG/ML
INJECTION, SOLUTION INTRAVENOUS CONTINUOUS
Status: DISCONTINUED | OUTPATIENT
Start: 2020-01-25 | End: 2020-01-26

## 2020-01-25 RX ORDER — POTASSIUM CHLORIDE 7.45 MG/ML
10 INJECTION INTRAVENOUS PRN
Status: DISCONTINUED | OUTPATIENT
Start: 2020-01-25 | End: 2020-01-26

## 2020-01-25 RX ORDER — SODIUM CHLORIDE 0.9 % (FLUSH) 0.9 %
10 SYRINGE (ML) INJECTION PRN
Status: DISCONTINUED | OUTPATIENT
Start: 2020-01-25 | End: 2020-01-26 | Stop reason: SDUPTHER

## 2020-01-25 RX ORDER — HYDRALAZINE HYDROCHLORIDE 20 MG/ML
10 INJECTION INTRAMUSCULAR; INTRAVENOUS EVERY 6 HOURS PRN
Status: DISCONTINUED | OUTPATIENT
Start: 2020-01-25 | End: 2020-01-28 | Stop reason: HOSPADM

## 2020-01-25 RX ORDER — SODIUM CHLORIDE 0.9 % (FLUSH) 0.9 %
10 SYRINGE (ML) INJECTION EVERY 12 HOURS SCHEDULED
Status: DISCONTINUED | OUTPATIENT
Start: 2020-01-25 | End: 2020-01-26 | Stop reason: SDUPTHER

## 2020-01-25 RX ORDER — LISINOPRIL 5 MG/1
2.5 TABLET ORAL DAILY
Status: DISCONTINUED | OUTPATIENT
Start: 2020-01-25 | End: 2020-01-28 | Stop reason: HOSPADM

## 2020-01-25 RX ORDER — FENTANYL CITRATE 50 UG/ML
25 INJECTION, SOLUTION INTRAMUSCULAR; INTRAVENOUS ONCE
Status: COMPLETED | OUTPATIENT
Start: 2020-01-25 | End: 2020-01-25

## 2020-01-25 RX ORDER — ROSUVASTATIN CALCIUM 10 MG/1
5 TABLET, COATED ORAL NIGHTLY
Status: DISCONTINUED | OUTPATIENT
Start: 2020-01-25 | End: 2020-01-28 | Stop reason: HOSPADM

## 2020-01-25 RX ORDER — SIMVASTATIN 20 MG
10 TABLET ORAL NIGHTLY
Status: DISCONTINUED | OUTPATIENT
Start: 2020-01-25 | End: 2020-01-25 | Stop reason: CLARIF

## 2020-01-25 RX ORDER — POTASSIUM CHLORIDE 20 MEQ/1
40 TABLET, EXTENDED RELEASE ORAL PRN
Status: DISCONTINUED | OUTPATIENT
Start: 2020-01-25 | End: 2020-01-25

## 2020-01-25 RX ORDER — OXYCODONE HYDROCHLORIDE AND ACETAMINOPHEN 5; 325 MG/1; MG/1
1 TABLET ORAL ONCE
Status: COMPLETED | OUTPATIENT
Start: 2020-01-25 | End: 2020-01-25

## 2020-01-25 RX ORDER — ZOLEDRONIC ACID 5 MG/100ML
5 INJECTION, SOLUTION INTRAVENOUS ONCE
Status: DISCONTINUED | OUTPATIENT
Start: 2020-01-25 | End: 2020-01-25 | Stop reason: ALTCHOICE

## 2020-01-25 RX ORDER — MORPHINE SULFATE 4 MG/ML
4 INJECTION, SOLUTION INTRAMUSCULAR; INTRAVENOUS
Status: DISCONTINUED | OUTPATIENT
Start: 2020-01-25 | End: 2020-01-28 | Stop reason: HOSPADM

## 2020-01-25 RX ORDER — POTASSIUM CHLORIDE 7.45 MG/ML
10 INJECTION INTRAVENOUS PRN
Status: DISCONTINUED | OUTPATIENT
Start: 2020-01-25 | End: 2020-01-25

## 2020-01-25 RX ORDER — LEVOTHYROXINE SODIUM 88 UG/1
88 TABLET ORAL
Status: DISCONTINUED | OUTPATIENT
Start: 2020-01-27 | End: 2020-01-28 | Stop reason: HOSPADM

## 2020-01-25 RX ORDER — 0.9 % SODIUM CHLORIDE 0.9 %
500 INTRAVENOUS SOLUTION INTRAVENOUS PRN
Status: DISCONTINUED | OUTPATIENT
Start: 2020-01-25 | End: 2020-01-28 | Stop reason: HOSPADM

## 2020-01-25 RX ORDER — UBIDECARENONE 75 MG
50 CAPSULE ORAL DAILY
Status: DISCONTINUED | OUTPATIENT
Start: 2020-01-27 | End: 2020-01-28 | Stop reason: HOSPADM

## 2020-01-25 RX ORDER — MORPHINE SULFATE 2 MG/ML
2 INJECTION, SOLUTION INTRAMUSCULAR; INTRAVENOUS
Status: DISCONTINUED | OUTPATIENT
Start: 2020-01-25 | End: 2020-01-28 | Stop reason: HOSPADM

## 2020-01-25 RX ORDER — OXYBUTYNIN CHLORIDE 5 MG/1
5 TABLET, EXTENDED RELEASE ORAL NIGHTLY
Status: DISCONTINUED | OUTPATIENT
Start: 2020-01-25 | End: 2020-01-28 | Stop reason: HOSPADM

## 2020-01-25 RX ORDER — POTASSIUM CHLORIDE 20 MEQ/1
40 TABLET, EXTENDED RELEASE ORAL PRN
Status: DISCONTINUED | OUTPATIENT
Start: 2020-01-25 | End: 2020-01-26

## 2020-01-25 RX ORDER — ONDANSETRON 2 MG/ML
4 INJECTION INTRAMUSCULAR; INTRAVENOUS EVERY 6 HOURS PRN
Status: DISCONTINUED | OUTPATIENT
Start: 2020-01-25 | End: 2020-01-26

## 2020-01-25 RX ORDER — M-VIT,TX,IRON,MINS/CALC/FOLIC 27MG-0.4MG
1 TABLET ORAL
Status: DISCONTINUED | OUTPATIENT
Start: 2020-01-26 | End: 2020-01-28 | Stop reason: HOSPADM

## 2020-01-25 RX ORDER — DONEPEZIL HYDROCHLORIDE 5 MG/1
5 TABLET, FILM COATED ORAL NIGHTLY
Status: DISCONTINUED | OUTPATIENT
Start: 2020-01-25 | End: 2020-01-28 | Stop reason: HOSPADM

## 2020-01-25 RX ORDER — OXYCODONE HYDROCHLORIDE 5 MG/1
10 TABLET ORAL EVERY 4 HOURS PRN
Status: DISCONTINUED | OUTPATIENT
Start: 2020-01-25 | End: 2020-01-28 | Stop reason: HOSPADM

## 2020-01-25 RX ORDER — OXYCODONE HYDROCHLORIDE 5 MG/1
5 TABLET ORAL EVERY 4 HOURS PRN
Status: DISCONTINUED | OUTPATIENT
Start: 2020-01-25 | End: 2020-01-28 | Stop reason: HOSPADM

## 2020-01-25 RX ORDER — IPRATROPIUM BROMIDE AND ALBUTEROL SULFATE 2.5; .5 MG/3ML; MG/3ML
1 SOLUTION RESPIRATORY (INHALATION) ONCE
Status: COMPLETED | OUTPATIENT
Start: 2020-01-25 | End: 2020-01-25

## 2020-01-25 RX ORDER — METHYLPREDNISOLONE SODIUM SUCCINATE 125 MG/2ML
125 INJECTION, POWDER, LYOPHILIZED, FOR SOLUTION INTRAMUSCULAR; INTRAVENOUS ONCE
Status: COMPLETED | OUTPATIENT
Start: 2020-01-25 | End: 2020-01-25

## 2020-01-25 RX ORDER — CEFAZOLIN SODIUM 1 G/3ML
2 INJECTION, POWDER, FOR SOLUTION INTRAMUSCULAR; INTRAVENOUS
Status: DISCONTINUED | OUTPATIENT
Start: 2020-01-26 | End: 2020-01-25 | Stop reason: SDUPTHER

## 2020-01-25 RX ADMIN — DONEPEZIL HYDROCHLORIDE 5 MG: 5 TABLET, FILM COATED ORAL at 21:54

## 2020-01-25 RX ADMIN — OXYBUTYNIN CHLORIDE 5 MG: 5 TABLET, EXTENDED RELEASE ORAL at 21:54

## 2020-01-25 RX ADMIN — OXYCODONE HYDROCHLORIDE AND ACETAMINOPHEN 1 TABLET: 5; 325 TABLET ORAL at 18:02

## 2020-01-25 RX ADMIN — FENTANYL CITRATE 25 MCG: 50 INJECTION, SOLUTION INTRAMUSCULAR; INTRAVENOUS at 18:54

## 2020-01-25 RX ADMIN — IPRATROPIUM BROMIDE AND ALBUTEROL SULFATE 1 AMPULE: .5; 3 SOLUTION RESPIRATORY (INHALATION) at 19:21

## 2020-01-25 RX ADMIN — Medication 10 ML: at 21:55

## 2020-01-25 RX ADMIN — ROSUVASTATIN CALCIUM 5 MG: 10 TABLET, FILM COATED ORAL at 21:54

## 2020-01-25 RX ADMIN — MORPHINE SULFATE 4 MG: 4 INJECTION INTRAVENOUS at 23:56

## 2020-01-25 RX ADMIN — LISINOPRIL 2.5 MG: 5 TABLET ORAL at 21:54

## 2020-01-25 RX ADMIN — METHYLPREDNISOLONE SODIUM SUCCINATE 125 MG: 125 INJECTION, POWDER, FOR SOLUTION INTRAMUSCULAR; INTRAVENOUS at 18:54

## 2020-01-25 RX ADMIN — SODIUM CHLORIDE: 9 INJECTION, SOLUTION INTRAVENOUS at 21:55

## 2020-01-25 RX ADMIN — METOPROLOL TARTRATE 37.5 MG: 25 TABLET, FILM COATED ORAL at 21:54

## 2020-01-25 ASSESSMENT — PAIN SCALES - GENERAL
PAINLEVEL_OUTOF10: 9
PAINLEVEL_OUTOF10: 10
PAINLEVEL_OUTOF10: 3
PAINLEVEL_OUTOF10: 9
PAINLEVEL_OUTOF10: 6
PAINLEVEL_OUTOF10: 8

## 2020-01-25 ASSESSMENT — PAIN DESCRIPTION - ORIENTATION
ORIENTATION: LEFT

## 2020-01-25 ASSESSMENT — PAIN DESCRIPTION - LOCATION
LOCATION: HIP

## 2020-01-25 ASSESSMENT — ENCOUNTER SYMPTOMS
EYE ITCHING: 0
ABDOMINAL PAIN: 0
CHOKING: 0
NAUSEA: 0
DIARRHEA: 0
CONSTIPATION: 0
RHINORRHEA: 0
WHEEZING: 0
VOMITING: 0
STRIDOR: 0
SHORTNESS OF BREATH: 0
COUGH: 0
EYE DISCHARGE: 0
FACIAL SWELLING: 0

## 2020-01-25 ASSESSMENT — PAIN DESCRIPTION - PAIN TYPE
TYPE: ACUTE PAIN

## 2020-01-25 NOTE — ED NOTES
Pt medicated per eMAR for pain 9/10, complains that percocet provided is almost like aspirin.       Reji Bass RN  04/28/33 [id number]

## 2020-01-25 NOTE — ED NOTES
Room air trail, pt drops O2 to 87% on room air. Placed on 2L of O2 via NC.       Reji Bass RN  33/68/90 1109

## 2020-01-25 NOTE — ED PROVIDER NOTES
Negative for chest pain. Gastrointestinal: Negative for abdominal pain, diarrhea, nausea and vomiting. Genitourinary: Negative for difficulty urinating, dysuria and hematuria. Musculoskeletal: Positive for arthralgias (L hip). Negative for neck pain and neck stiffness. Skin: Negative for rash. Neurological: Negative for dizziness, syncope, weakness, light-headedness, numbness and headaches. Positives and Pertinent negatives as per HPI. Except as noted above in the ROS, all other systems were reviewed and negative.        PAST MEDICAL HISTORY     Past Medical History:   Diagnosis Date    Carpal tunnel syndrome of left wrist     Chronic low back pain     Depression     [place] (hard of hearing)     Hyperlipidemia LDL goal <130     Hypothyroid     Insomnia     Osteoporosis     Urinary incontinence          SURGICAL HISTORY     Past Surgical History:   Procedure Laterality Date    BACK SURGERY           CURRENTMEDICATIONS       Previous Medications    AMITRIPTYLINE (ELAVIL) 10 MG TABLET    PRN    APIXABAN (ELIQUIS) 2.5 MG TABS TABLET    Take 1 tablet by mouth 2 times daily    CALCIUM CARBONATE-VITAMIN D (CALTRATE 600+D PO)    Take 1 tablet by mouth daily     KRILL OIL PO    Take 1 tablet by mouth daily    LEVOTHYROXINE (SYNTHROID) 88 MCG TABLET    Take 88 mcg by mouth Daily    LISINOPRIL (PRINIVIL;ZESTRIL) 5 MG TABLET    Take 0.5 tablets by mouth daily    METOPROLOL TARTRATE 37.5 MG TABS    Take 37.5 mg by mouth 2 times daily    MULTIVITAMIN-IRON-MINERALS-FOLIC ACID (CENTRUM) CHEWABLE TABLET    Take 1 tablet by mouth daily    OXYBUTYNIN (DITROPAN) 5 MG TABLET    TAKE ONE TABLET BY MOUTH TWICE A DAY    SIMVASTATIN (ZOCOR) 20 MG TABLET    TAKE ONE- HALF (1/2) TABLET BY MOUTH ONCE NIGHTLY    VITAMIN B-12 (CYANOCOBALAMIN) 100 MCG TABLET    Take 50 mcg by mouth daily    ZOLEDRONIC ACID (RECLAST) 5 MG/100ML SOLN    Infuse 100 mLs intravenously once for 1 dose         ALLERGIES     Prozac [fluoxetine hcl]    FAMILYHISTORY       Family History   Problem Relation Age of Onset    Heart Disease Father           SOCIAL HISTORY       Social History     Tobacco Use    Smoking status: Former Smoker     Packs/day: 0.25     Years: 10.00     Pack years: 2.50     Types: Cigarettes    Smokeless tobacco: Never Used   Substance Use Topics    Alcohol use: Yes     Alcohol/week: 1.0 standard drinks     Types: 1 Glasses of wine per week     Comment: occasional glass of wine or mixed drink    Drug use: No       SCREENINGS             PHYSICAL EXAM    (up to 7 for level 4, 8 or more for level 5)     ED Triage Vitals [01/25/20 1658]   BP Temp Temp src Pulse Resp SpO2 Height Weight   (!) 210/76 98.1 °F (36.7 °C) -- 66 18 -- 5' 5\" (1.651 m) 94 lb (42.6 kg)       Physical Exam  Vitals signs and nursing note reviewed. Constitutional:       Appearance: She is well-developed. She is not diaphoretic. HENT:      Head: Normocephalic and atraumatic. Right Ear: External ear normal.      Left Ear: External ear normal.      Nose: Nose normal.   Eyes:      General:         Right eye: No discharge. Left eye: No discharge. Neck:      Musculoskeletal: Normal range of motion and neck supple. Cardiovascular:      Rate and Rhythm: Normal rate and regular rhythm. Heart sounds: Normal heart sounds. Pulmonary:      Effort: Pulmonary effort is normal. No respiratory distress. Breath sounds: Rales present. Abdominal:      General: There is no distension. Tenderness: There is no abdominal tenderness. Musculoskeletal:      Left hip: She exhibits decreased range of motion. She exhibits no tenderness, no swelling and no deformity. Skin:     General: Skin is warm and dry. Neurological:      Mental Status: She is alert and oriented to person, place, and time. Mental status is at baseline. GCS: GCS eye subscore is 4. GCS verbal subscore is 5. GCS motor subscore is 6.       Cranial Nerves: Cranial nerves are intact. Sensory: Sensation is intact. Motor: Motor function is intact.    Psychiatric:         Behavior: Behavior normal.         DIAGNOSTIC RESULTS   LABS:    Labs Reviewed   CBC WITH AUTO DIFFERENTIAL - Abnormal; Notable for the following components:       Result Value    WBC 16.3 (*)     RBC 3.35 (*)     Hemoglobin 10.5 (*)     Hematocrit 32.0 (*)     Neutrophils Absolute 14.7 (*)     All other components within normal limits    Narrative:     Performed at:  [facility] [address], [place]   Phone 21  - Abnormal; Notable for the following components:    Pro-BNP 2,797 (*)     All other components within normal limits    Narrative:     CALL  [facility] tel. [phone],  Rejected Test Name irene fischer, 01/25/2020 18:16, by Flo Pod  Performed at:  [facility] [address], [place]   Phone [phone]   COMPREHENSIVE METABOLIC PANEL - Abnormal; Notable for the following components:    CO2 18 (*)     Calcium 7.0 (*)     Total Protein 5.4 (*)     Alb 3.3 (*)     Alkaline Phosphatase 37 (*)     All other components within normal limits    Narrative:     Performed at:  [facility] [address], [place]   Phone [phone]   TROPONIN    Narrative:     1 [facility] [phone],  Rejected Test Name irene fischer, 01/25/2020 18:16, by Flo Pod  Performed at:  [facility] [address], [place]   Phone [phone]   APTT    Narrative:     Performed at:  [facility] [address] [place]   Phone [phone]   PROTIME-INR    Narrative:     Performed at:  [facility] [address], [place]   Phone [phone] exam, she is resting comfortably in bed no acute distress and nontoxic. She is hypertensive and hypoxic in the low 80s on room air, placed on 2 L nasal cannula. Vitals otherwise stable. She is alert and oriented acting appropriate at baseline per family 13. Cranial nerves II through XII are intact. Scalp is atraumatic neck and back are nontender. She does have crackles noted bilaterally with no obvious wheezing. Good aeration. Chest is nontender and abdomen is benign. She is subjective pain to the left hip worse with range of motion with decreased range of motion secondary to this but there is no focal reproducible bony tenderness step-offs crepitus deformity or dislocation. She is neurovascular intact distally. She was initially given Percocet for symptomatic relief and will be reevaluated. Please see attending note for EKG interpretation. Due to initial hypoxia, additional questioning was addressed and patient continues to deny any increasing shortness of breath. She denies history CAD, CHF or COPD. No leg pain or swelling. CBC and CMP are remarkable for leukocytosis of 16.3, stable mild anemia with a hemoglobin of 10.5. She is hypocalcemic. Troponin is negative. BNP 2797, consistent with her baseline. Coags are within normal limits. X-ray shows findings similar to previous study concerning for underlying COPD. Patient was given DuoNeb breathing treatment IV Solu-Medrol for symptomatic relief. X-ray of the left hip and pelvis show nondisplaced left subcapital femoral neck fracture. I spoke with the orthopedic surgeon, Dr. Chika Paredes, he recommends medical admission n.p.o. after midnight plan for ORIF with pinning tomorrow morning. Patient was informed of this. She still had continued pain on reevaluation was given dose of fentanyl. I spoke with the admitting physician, Dr. Maite Orona, who will resume care of the patient at this time. Patient and family were also informed and agreeable.   She is stable for admission. FINAL IMPRESSION      1. Fall, initial encounter    2. Closed displaced intertrochanteric fracture of left femur, initial encounter ([facility] [address]    3. Hypoxia    4. Elevated blood pressure reading          DISPOSITION/PLAN   DISPOSITION        PATIENT REFERREDTO:  No follow-up provider specified.     DISCHARGE MEDICATIONS:  New Prescriptions    No medications on file       DISCONTINUED MEDICATIONS:  Discontinued Medications    No medications on file              (Please note that portions of this note were completed with a voice recognition program.  Efforts were made to edit the dictations but occasionally words are mis-transcribed.)    Nayely Medrano PA-C (electronically signed)           Pradeep Slaughter PA-C  01/25/20 5766 no loss of consciousness, no gait abnormality, no headache, no sensory deficits, and no weakness.

## 2020-01-25 NOTE — ED NOTES
Pt 86% on room air upon arrival. Good pleath noted. Placed on 2L of O2 via NC.       John Boothe RN  52/38/75 4034

## 2020-01-25 NOTE — PROGRESS NOTES
[facility] Orthopedic Surgery  Consult Note          Orthopedic Consult, full note to follow in am.    Melisa Felix 80 y.o. admitted for a fall with left hip pain. Xray reviewed, and showed a minimally displaced impacted left femoral neck fracture. Plan:  - Recommend left hip pinning.  - Hold Eliquis  - Surgery tomorrow around 9:30 AM if medically stable. Thank you very much for the kind consultation and allowing me to participate in this patient's care. I will continue to keep you apprised of her progress.         Andry Mckeon MD 1/25/2020 6:54 PM

## 2020-01-26 ENCOUNTER — ANESTHESIA (OUTPATIENT)
Dept: OPERATING ROOM | Age: 85
DRG: 481 | End: 2020-01-26
Payer: MEDICARE

## 2020-01-26 ENCOUNTER — APPOINTMENT (OUTPATIENT)
Dept: GENERAL RADIOLOGY | Age: 85
DRG: 481 | End: 2020-01-26
Payer: MEDICARE

## 2020-01-26 ENCOUNTER — ANESTHESIA EVENT (OUTPATIENT)
Dept: OPERATING ROOM | Age: 85
DRG: 481 | End: 2020-01-26
Payer: MEDICARE

## 2020-01-26 VITALS
RESPIRATION RATE: 27 BRPM | OXYGEN SATURATION: 97 % | DIASTOLIC BLOOD PRESSURE: 65 MMHG | TEMPERATURE: 97.3 F | SYSTOLIC BLOOD PRESSURE: 137 MMHG

## 2020-01-26 LAB
A/G RATIO: 1.5 (ref 1.1–2.2)
ALBUMIN SERPL-MCNC: 3.8 G/DL (ref 3.4–5)
ALP BLD-CCNC: 47 U/L (ref 40–129)
ALT SERPL-CCNC: 9 U/L (ref 10–40)
ANION GAP SERPL CALCULATED.3IONS-SCNC: 10 MMOL/L (ref 3–16)
AST SERPL-CCNC: 21 U/L (ref 15–37)
BASOPHILS ABSOLUTE: 0 K/UL (ref 0–0.2)
BASOPHILS RELATIVE PERCENT: 0.2 %
BILIRUB SERPL-MCNC: 0.7 MG/DL (ref 0–1)
BUN BLDV-MCNC: 24 MG/DL (ref 7–20)
CALCIUM SERPL-MCNC: 8.3 MG/DL (ref 8.3–10.6)
CHLORIDE BLD-SCNC: 100 MMOL/L (ref 99–110)
CO2: 21 MMOL/L (ref 21–32)
CREAT SERPL-MCNC: 1 MG/DL (ref 0.6–1.2)
EKG ATRIAL RATE: 79 BPM
EKG DIAGNOSIS: NORMAL
EKG P AXIS: 69 DEGREES
EKG P-R INTERVAL: 182 MS
EKG Q-T INTERVAL: 386 MS
EKG QRS DURATION: 90 MS
EKG QTC CALCULATION (BAZETT): 442 MS
EKG R AXIS: -61 DEGREES
EKG T AXIS: 74 DEGREES
EKG VENTRICULAR RATE: 79 BPM
EOSINOPHILS ABSOLUTE: 0 K/UL (ref 0–0.6)
EOSINOPHILS RELATIVE PERCENT: 0 %
GFR AFRICAN AMERICAN: >60
GFR NON-AFRICAN AMERICAN: 52
GLOBULIN: 2.6 G/DL
GLUCOSE BLD-MCNC: 186 MG/DL (ref 70–99)
HCT VFR BLD CALC: 30.4 % (ref 36–48)
HEMOGLOBIN: 10.2 G/DL (ref 12–16)
LYMPHOCYTES ABSOLUTE: 0.5 K/UL (ref 1–5.1)
LYMPHOCYTES RELATIVE PERCENT: 6.3 %
MCH RBC QN AUTO: 31.8 PG (ref 26–34)
MCHC RBC AUTO-ENTMCNC: 33.5 G/DL (ref 31–36)
MCV RBC AUTO: 94.9 FL (ref 80–100)
MONOCYTES ABSOLUTE: 0.1 K/UL (ref 0–1.3)
MONOCYTES RELATIVE PERCENT: 1.6 %
NEUTROPHILS ABSOLUTE: 7.8 K/UL (ref 1.7–7.7)
NEUTROPHILS RELATIVE PERCENT: 91.9 %
PDW BLD-RTO: 15.2 % (ref 12.4–15.4)
PLATELET # BLD: 228 K/UL (ref 135–450)
PMV BLD AUTO: 7.5 FL (ref 5–10.5)
POTASSIUM REFLEX MAGNESIUM: 5.2 MMOL/L (ref 3.5–5.1)
RBC # BLD: 3.2 M/UL (ref 4–5.2)
SODIUM BLD-SCNC: 131 MMOL/L (ref 136–145)
TOTAL PROTEIN: 6.4 G/DL (ref 6.4–8.2)
WBC # BLD: 8.5 K/UL (ref 4–11)

## 2020-01-26 PROCEDURE — 1200000000 HC SEMI PRIVATE

## 2020-01-26 PROCEDURE — 6360000002 HC RX W HCPCS: Performed by: INTERNAL MEDICINE

## 2020-01-26 PROCEDURE — 3700000000 HC ANESTHESIA ATTENDED CARE: Performed by: ORTHOPAEDIC SURGERY

## 2020-01-26 PROCEDURE — 6370000000 HC RX 637 (ALT 250 FOR IP): Performed by: ORTHOPAEDIC SURGERY

## 2020-01-26 PROCEDURE — 3700000001 HC ADD 15 MINUTES (ANESTHESIA): Performed by: ORTHOPAEDIC SURGERY

## 2020-01-26 PROCEDURE — 2700000000 HC OXYGEN THERAPY PER DAY

## 2020-01-26 PROCEDURE — 7100000000 HC PACU RECOVERY - FIRST 15 MIN: Performed by: ORTHOPAEDIC SURGERY

## 2020-01-26 PROCEDURE — 36415 COLL VENOUS BLD VENIPUNCTURE: CPT

## 2020-01-26 PROCEDURE — 6360000002 HC RX W HCPCS: Performed by: ANESTHESIOLOGY

## 2020-01-26 PROCEDURE — 2500000003 HC RX 250 WO HCPCS

## 2020-01-26 PROCEDURE — 2500000003 HC RX 250 WO HCPCS: Performed by: ANESTHESIOLOGY

## 2020-01-26 PROCEDURE — 0QH734Z INSERTION OF INTERNAL FIXATION DEVICE INTO LEFT UPPER FEMUR, PERCUTANEOUS APPROACH: ICD-10-PCS | Performed by: ORTHOPAEDIC SURGERY

## 2020-01-26 PROCEDURE — 2500000003 HC RX 250 WO HCPCS: Performed by: ORTHOPAEDIC SURGERY

## 2020-01-26 PROCEDURE — 3600000004 HC SURGERY LEVEL 4 BASE: Performed by: ORTHOPAEDIC SURGERY

## 2020-01-26 PROCEDURE — 2580000003 HC RX 258: Performed by: ORTHOPAEDIC SURGERY

## 2020-01-26 PROCEDURE — 2709999900 HC NON-CHARGEABLE SUPPLY: Performed by: ORTHOPAEDIC SURGERY

## 2020-01-26 PROCEDURE — 27235 TREAT THIGH FRACTURE: CPT | Performed by: ORTHOPAEDIC SURGERY

## 2020-01-26 PROCEDURE — C1769 GUIDE WIRE: HCPCS | Performed by: ORTHOPAEDIC SURGERY

## 2020-01-26 PROCEDURE — 93010 ELECTROCARDIOGRAM REPORT: CPT | Performed by: INTERNAL MEDICINE

## 2020-01-26 PROCEDURE — 6360000002 HC RX W HCPCS: Performed by: ORTHOPAEDIC SURGERY

## 2020-01-26 PROCEDURE — 80053 COMPREHEN METABOLIC PANEL: CPT

## 2020-01-26 PROCEDURE — 99222 1ST HOSP IP/OBS MODERATE 55: CPT | Performed by: ORTHOPAEDIC SURGERY

## 2020-01-26 PROCEDURE — 73502 X-RAY EXAM HIP UNI 2-3 VIEWS: CPT

## 2020-01-26 PROCEDURE — 2720000010 HC SURG SUPPLY STERILE: Performed by: ORTHOPAEDIC SURGERY

## 2020-01-26 PROCEDURE — 7100000001 HC PACU RECOVERY - ADDTL 15 MIN: Performed by: ORTHOPAEDIC SURGERY

## 2020-01-26 PROCEDURE — 3600000014 HC SURGERY LEVEL 4 ADDTL 15MIN: Performed by: ORTHOPAEDIC SURGERY

## 2020-01-26 PROCEDURE — C1713 ANCHOR/SCREW BN/BN,TIS/BN: HCPCS | Performed by: ORTHOPAEDIC SURGERY

## 2020-01-26 PROCEDURE — 2580000003 HC RX 258: Performed by: ANESTHESIOLOGY

## 2020-01-26 PROCEDURE — 85025 COMPLETE CBC W/AUTO DIFF WBC: CPT

## 2020-01-26 DEVICE — CANNULATED SCREW
Type: IMPLANTABLE DEVICE | Site: HIP | Status: FUNCTIONAL
Brand: ASNIS

## 2020-01-26 RX ORDER — HYDROMORPHONE HCL 110MG/55ML
0.25 PATIENT CONTROLLED ANALGESIA SYRINGE INTRAVENOUS EVERY 5 MIN PRN
Status: DISCONTINUED | OUTPATIENT
Start: 2020-01-26 | End: 2020-01-26 | Stop reason: HOSPADM

## 2020-01-26 RX ORDER — CEFAZOLIN SODIUM 2 G/100ML
2 INJECTION, SOLUTION INTRAVENOUS EVERY 8 HOURS
Status: COMPLETED | OUTPATIENT
Start: 2020-01-26 | End: 2020-01-27

## 2020-01-26 RX ORDER — PROPOFOL 10 MG/ML
INJECTION, EMULSION INTRAVENOUS PRN
Status: DISCONTINUED | OUTPATIENT
Start: 2020-01-26 | End: 2020-01-26 | Stop reason: SDUPTHER

## 2020-01-26 RX ORDER — DOCUSATE SODIUM 100 MG/1
100 CAPSULE, LIQUID FILLED ORAL 2 TIMES DAILY
Status: DISCONTINUED | OUTPATIENT
Start: 2020-01-26 | End: 2020-01-28 | Stop reason: HOSPADM

## 2020-01-26 RX ORDER — LABETALOL HYDROCHLORIDE 5 MG/ML
INJECTION, SOLUTION INTRAVENOUS
Status: COMPLETED
Start: 2020-01-26 | End: 2020-01-26

## 2020-01-26 RX ORDER — ONDANSETRON 2 MG/ML
4 INJECTION INTRAMUSCULAR; INTRAVENOUS EVERY 6 HOURS PRN
Status: DISCONTINUED | OUTPATIENT
Start: 2020-01-26 | End: 2020-01-28 | Stop reason: HOSPADM

## 2020-01-26 RX ORDER — SODIUM CHLORIDE 0.9 % (FLUSH) 0.9 %
10 SYRINGE (ML) INJECTION EVERY 12 HOURS SCHEDULED
Status: DISCONTINUED | OUTPATIENT
Start: 2020-01-26 | End: 2020-01-28 | Stop reason: HOSPADM

## 2020-01-26 RX ORDER — LIDOCAINE HYDROCHLORIDE 20 MG/ML
INJECTION, SOLUTION EPIDURAL; INFILTRATION; INTRACAUDAL; PERINEURAL PRN
Status: DISCONTINUED | OUTPATIENT
Start: 2020-01-26 | End: 2020-01-26 | Stop reason: SDUPTHER

## 2020-01-26 RX ORDER — ONDANSETRON 2 MG/ML
INJECTION INTRAMUSCULAR; INTRAVENOUS PRN
Status: DISCONTINUED | OUTPATIENT
Start: 2020-01-26 | End: 2020-01-26 | Stop reason: SDUPTHER

## 2020-01-26 RX ORDER — SENNA AND DOCUSATE SODIUM 50; 8.6 MG/1; MG/1
1 TABLET, FILM COATED ORAL 2 TIMES DAILY
Status: DISCONTINUED | OUTPATIENT
Start: 2020-01-26 | End: 2020-01-28 | Stop reason: HOSPADM

## 2020-01-26 RX ORDER — FENTANYL CITRATE 50 UG/ML
INJECTION, SOLUTION INTRAMUSCULAR; INTRAVENOUS PRN
Status: DISCONTINUED | OUTPATIENT
Start: 2020-01-26 | End: 2020-01-26 | Stop reason: SDUPTHER

## 2020-01-26 RX ORDER — SODIUM CHLORIDE 0.9 % (FLUSH) 0.9 %
10 SYRINGE (ML) INJECTION PRN
Status: DISCONTINUED | OUTPATIENT
Start: 2020-01-26 | End: 2020-01-28 | Stop reason: HOSPADM

## 2020-01-26 RX ORDER — LIDOCAINE HYDROCHLORIDE 10 MG/ML
1 INJECTION, SOLUTION EPIDURAL; INFILTRATION; INTRACAUDAL; PERINEURAL
Status: DISCONTINUED | OUTPATIENT
Start: 2020-01-26 | End: 2020-01-26 | Stop reason: HOSPADM

## 2020-01-26 RX ORDER — HYDROCODONE BITARTRATE AND ACETAMINOPHEN 5; 325 MG/1; MG/1
1 TABLET ORAL
Status: DISCONTINUED | OUTPATIENT
Start: 2020-01-26 | End: 2020-01-26 | Stop reason: HOSPADM

## 2020-01-26 RX ORDER — SODIUM CHLORIDE 9 MG/ML
INJECTION, SOLUTION INTRAVENOUS CONTINUOUS
Status: DISCONTINUED | OUTPATIENT
Start: 2020-01-26 | End: 2020-01-26

## 2020-01-26 RX ORDER — PROMETHAZINE HYDROCHLORIDE 25 MG/ML
3.12 INJECTION, SOLUTION INTRAMUSCULAR; INTRAVENOUS ONCE
Status: COMPLETED | OUTPATIENT
Start: 2020-01-26 | End: 2020-01-26

## 2020-01-26 RX ORDER — LABETALOL HYDROCHLORIDE 5 MG/ML
2.5 INJECTION, SOLUTION INTRAVENOUS ONCE
Status: COMPLETED | OUTPATIENT
Start: 2020-01-26 | End: 2020-01-26

## 2020-01-26 RX ORDER — BUPIVACAINE HYDROCHLORIDE 5 MG/ML
INJECTION, SOLUTION EPIDURAL; INTRACAUDAL
Status: COMPLETED | OUTPATIENT
Start: 2020-01-26 | End: 2020-01-26

## 2020-01-26 RX ORDER — MAGNESIUM HYDROXIDE 1200 MG/15ML
LIQUID ORAL CONTINUOUS PRN
Status: COMPLETED | OUTPATIENT
Start: 2020-01-26 | End: 2020-01-26

## 2020-01-26 RX ORDER — SODIUM CHLORIDE 450 MG/100ML
INJECTION, SOLUTION INTRAVENOUS CONTINUOUS
Status: DISCONTINUED | OUTPATIENT
Start: 2020-01-26 | End: 2020-01-28

## 2020-01-26 RX ORDER — DEXAMETHASONE SODIUM PHOSPHATE 4 MG/ML
INJECTION, SOLUTION INTRA-ARTICULAR; INTRALESIONAL; INTRAMUSCULAR; INTRAVENOUS; SOFT TISSUE PRN
Status: DISCONTINUED | OUTPATIENT
Start: 2020-01-26 | End: 2020-01-26 | Stop reason: SDUPTHER

## 2020-01-26 RX ORDER — SODIUM CHLORIDE 9 MG/ML
INJECTION, SOLUTION INTRAVENOUS CONTINUOUS PRN
Status: DISCONTINUED | OUTPATIENT
Start: 2020-01-26 | End: 2020-01-26 | Stop reason: SDUPTHER

## 2020-01-26 RX ORDER — ONDANSETRON 2 MG/ML
4 INJECTION INTRAMUSCULAR; INTRAVENOUS
Status: DISCONTINUED | OUTPATIENT
Start: 2020-01-26 | End: 2020-01-26 | Stop reason: HOSPADM

## 2020-01-26 RX ADMIN — MORPHINE SULFATE 2 MG: 2 INJECTION, SOLUTION INTRAMUSCULAR; INTRAVENOUS at 15:49

## 2020-01-26 RX ADMIN — CEFAZOLIN SODIUM 2 G: 2 INJECTION, SOLUTION INTRAVENOUS at 10:31

## 2020-01-26 RX ADMIN — SODIUM CHLORIDE: 9 INJECTION, SOLUTION INTRAVENOUS at 10:37

## 2020-01-26 RX ADMIN — METOPROLOL TARTRATE 37.5 MG: 25 TABLET, FILM COATED ORAL at 21:31

## 2020-01-26 RX ADMIN — FENTANYL CITRATE 25 MCG: 50 INJECTION, SOLUTION INTRAMUSCULAR; INTRAVENOUS at 11:05

## 2020-01-26 RX ADMIN — DONEPEZIL HYDROCHLORIDE 5 MG: 5 TABLET, FILM COATED ORAL at 21:32

## 2020-01-26 RX ADMIN — LABETALOL HYDROCHLORIDE 2.5 MG: 5 INJECTION, SOLUTION INTRAVENOUS at 12:24

## 2020-01-26 RX ADMIN — APIXABAN 2.5 MG: 2.5 TABLET, FILM COATED ORAL at 21:32

## 2020-01-26 RX ADMIN — ONDANSETRON 4 MG: 2 INJECTION INTRAMUSCULAR; INTRAVENOUS at 11:05

## 2020-01-26 RX ADMIN — CEFAZOLIN SODIUM 2 G: 2 INJECTION, SOLUTION INTRAVENOUS at 21:32

## 2020-01-26 RX ADMIN — ROSUVASTATIN CALCIUM 5 MG: 10 TABLET, FILM COATED ORAL at 21:31

## 2020-01-26 RX ADMIN — PROMETHAZINE HYDROCHLORIDE 3.25 MG: 25 INJECTION INTRAMUSCULAR; INTRAVENOUS at 12:09

## 2020-01-26 RX ADMIN — PROPOFOL 50 MG: 10 INJECTION, EMULSION INTRAVENOUS at 10:46

## 2020-01-26 RX ADMIN — LABETALOL HYDROCHLORIDE 2.5 MG: 5 INJECTION INTRAVENOUS at 12:24

## 2020-01-26 RX ADMIN — DEXAMETHASONE SODIUM PHOSPHATE 6 MG: 4 INJECTION, SOLUTION INTRAMUSCULAR; INTRAVENOUS at 11:05

## 2020-01-26 RX ADMIN — DOCUSATE SODIUM 100 MG: 100 CAPSULE ORAL at 21:32

## 2020-01-26 RX ADMIN — FENTANYL CITRATE 25 MCG: 50 INJECTION, SOLUTION INTRAMUSCULAR; INTRAVENOUS at 10:40

## 2020-01-26 RX ADMIN — SENNOSIDES AND DOCUSATE SODIUM 1 TABLET: 8.6; 5 TABLET ORAL at 21:32

## 2020-01-26 RX ADMIN — ONDANSETRON 4 MG: 2 INJECTION INTRAMUSCULAR; INTRAVENOUS at 00:38

## 2020-01-26 RX ADMIN — LIDOCAINE HYDROCHLORIDE 60 MG: 20 INJECTION, SOLUTION EPIDURAL; INFILTRATION; INTRACAUDAL; PERINEURAL at 10:38

## 2020-01-26 RX ADMIN — SODIUM CHLORIDE: 4.5 INJECTION, SOLUTION INTRAVENOUS at 15:49

## 2020-01-26 RX ADMIN — OXYBUTYNIN CHLORIDE 5 MG: 5 TABLET, EXTENDED RELEASE ORAL at 21:32

## 2020-01-26 ASSESSMENT — PULMONARY FUNCTION TESTS
PIF_VALUE: 16
PIF_VALUE: 16
PIF_VALUE: 15
PIF_VALUE: 16
PIF_VALUE: 15
PIF_VALUE: 11
PIF_VALUE: 16
PIF_VALUE: 16
PIF_VALUE: 1
PIF_VALUE: 1
PIF_VALUE: 5
PIF_VALUE: 15
PIF_VALUE: 2
PIF_VALUE: 14
PIF_VALUE: 1
PIF_VALUE: 16
PIF_VALUE: 3
PIF_VALUE: 0
PIF_VALUE: 16
PIF_VALUE: 1
PIF_VALUE: 15
PIF_VALUE: 16
PIF_VALUE: 2
PIF_VALUE: 16
PIF_VALUE: 4
PIF_VALUE: 15
PIF_VALUE: 16
PIF_VALUE: 15
PIF_VALUE: 16
PIF_VALUE: 2
PIF_VALUE: 15
PIF_VALUE: 18
PIF_VALUE: 19
PIF_VALUE: 13
PIF_VALUE: 14
PIF_VALUE: 16
PIF_VALUE: 14
PIF_VALUE: 16
PIF_VALUE: 18
PIF_VALUE: 16
PIF_VALUE: 18
PIF_VALUE: 14
PIF_VALUE: 16
PIF_VALUE: 15
PIF_VALUE: 15
PIF_VALUE: 18
PIF_VALUE: 16
PIF_VALUE: 14

## 2020-01-26 ASSESSMENT — PAIN DESCRIPTION - LOCATION: LOCATION: HIP

## 2020-01-26 ASSESSMENT — PAIN SCALES - GENERAL
PAINLEVEL_OUTOF10: 0
PAINLEVEL_OUTOF10: 8
PAINLEVEL_OUTOF10: 0
PAINLEVEL_OUTOF10: 7

## 2020-01-26 ASSESSMENT — PAIN DESCRIPTION - ORIENTATION: ORIENTATION: LEFT

## 2020-01-26 ASSESSMENT — PAIN DESCRIPTION - PAIN TYPE: TYPE: ACUTE PAIN

## 2020-01-26 NOTE — H&P
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 HENT: Negative for facial swelling and hearing loss. Eyes: Negative for discharge and itching. Respiratory: Negative for choking and stridor. Cardiovascular: Negative for chest pain and leg swelling. Gastrointestinal: Negative for abdominal pain and constipation. Endocrine: Negative for cold intolerance and heat intolerance. Genitourinary: Negative for dyspareunia and flank pain. Musculoskeletal: Negative for gait problem and joint swelling. Skin: Negative for rash. Allergic/Immunologic: Negative for food allergies. Neurological: Negative for seizures and facial asymmetry. Hematological: Negative for adenopathy. Psychiatric/Behavioral: Negative for dysphoric mood. The patient is not nervous/anxious.             Past Medical History:   Past Medical History:   Diagnosis Date    Carpal tunnel syndrome of left wrist     Chronic low back pain     Depression     [place] (hard of hearing)     Hyperlipidemia LDL goal <130     Hypothyroid     Insomnia     Osteoporosis     Urinary incontinence        Past Surgical History:   Past Surgical History:   Procedure Laterality Date    BACK SURGERY         Social History:   Social History     Tobacco History     Smoking Status  Former Smoker Smoking Frequency  0.25 packs/day for 10 years (2.5 pk yrs) Smoking Tobacco Type  Cigarettes    Smokeless Tobacco Use  Never Used          Alcohol History     Alcohol Use Status  Yes Drinks/Week  1 Glasses of wine per week Amount  1.0 standard drinks of alcohol/wk Comment  occasional glass of wine or mixed drink EXAM: (2-7 system for EPF/Detailed, ?8 for Comprehensive)  BP (!) 192/70   Pulse 66   Temp 98.1 °F (36.7 °C)   Resp 16   Ht 5' 5\" (1.651 m)   Wt 94 lb (42.6 kg)   SpO2 96%   BMI 15.64 kg/m²   Constitutional: vitals as above: alert, appears stated age and cooperative  Psychiatric: normal insight and judgment, oriented to person, place, time, and general circumstances  Head: Normocephalic, without obvious abnormality, atraumatic  Eyes:lids and lashes normal, conjunctivae and sclerae normal and pupils equal, round, reactive to light and accomodation  EMNT: external ears normal, lips normal  Neck: no JVD, supple, symmetrical, trachea midline and thyroid not enlarged, symmetric, no tenderness/mass/nodules   Respiratory: clear to auscultation and percussion bilaterally with normal respiratory effort  Cardiovascular: normal rate, regular rhythm, normal S1 and S2 and no gallops  Gastrointestinal: soft, non-tender, non-distended, normal bowel sounds, no masses or organomegaly  Lymphatic:   Extremities: no edema, l leg externally rotated  Skin:No rashes or nodules noted.   Neurologic:    LABS:  Labs Reviewed   CBC WITH AUTO DIFFERENTIAL - Abnormal; Notable for the following components:       Result Value    WBC 16.3 (*)     RBC 3.35 (*)     Hemoglobin 10.5 (*)     Hematocrit 32.0 (*)     Neutrophils Absolute 14.7 (*)     All other components within normal limits    Narrative:     Performed at:  [facility] [address], [place], [facility] [email or web address]   Phone 21 [phone] - Abnormal; Notable for the following components:    Pro-BNP 2,797 (*)     All other components within normal limits    Narrative:     CALL  [facility] telTerald New [phone],  Rejected Test Name cmp Jacqualine Client Trina fischer, 01/25/2020 18:16, by Chanel Muse  Performed at:  [facility] [address], [place], [facility] [email or web address]   Phone [phone] blood pressures. continue iv pain meds as adequate pain control will lead to better bp control. Will monitor labs to assess Creat/K for possible complications of medications. Diagnoses as listed above, designated as new or established and plan outlined for each. Data Reviewed:   (1) Lab tests were reviewed or ordered. (1) Radiology tests were reviewed or ordered. (1) Medical test (Echo, EKG, PFT/debby) were not ordered. (1)History was not obtained from someone other than patient  (1) Old records  were reviewed - see HPI/MDM for pertinent details if review done. (2) Case wasdiscussed with another health care provider: [facility] PA  (2) Imaging was viewed by myself. Hip plain film  (2) EKG  was viewed by myself. The patient isbeing placed in inpatient status with the expectation of requiring a hospital stay spanning at least two midnights for care and treatment of the problems noted in the problem list.  This determination is also based on thepatients comorbidities and past medical history, the severity and timing of the signs and symptoms upon presentation.         Electronically signed by: Yasmin Hutton 1/25/2020

## 2020-01-26 NOTE — CARE COORDINATION
Aware of consult for poss placement on d/c.  Provided pt and family w/SNF list and Sw phone number to call w/selections. Family may be interested in ARU at [facility] if pt qualifies-daughter lives on that side of [facility]. PT/OT evals pending. No precert needed.   Electronically signed by JOSE CARLOS Christensen on 1/26/2020 at 1:56 PM

## 2020-01-26 NOTE — ED NOTES
Report to [facility], nurse resuming care of patient. No questions or concerns at this time. Pt transported to room with belongings in tow.       Senia Castanon RN  71/11/36 2024

## 2020-01-26 NOTE — ANESTHESIA POSTPROCEDURE EVALUATION
Department of Anesthesiology  Postprocedure Note    Patient: Narciso Felix  MRN: [id number]  YOB: 1931  Date of evaluation: 1/26/2020  Time:  11:44 AM     Procedure Summary     Date:  01/26/20 Room / Location:  [facility] OR [address] [place] [address]    Anesthesia Start:  1037 Anesthesia Stop:      Procedure:  LEFT HIP PINNING (Left Hip) Diagnosis:  (left hip fracture)    Surgeon:  Shayna Casillas MD Responsible Provider:  Christian Bashir MD    Anesthesia Type:  general ASA Status:  3          Anesthesia Type: general    Roberto Phase I: Roberto Score: 9    Roberto Phase II:      Last vitals: Reviewed and per EMR flowsheets.        Anesthesia Post Evaluation    Patient location during evaluation: PACU  Patient participation: complete - patient participated  Level of consciousness: awake  Airway patency: patent  Nausea & Vomiting: no vomiting  Complications: no  Cardiovascular status: hemodynamically stable  Respiratory status: acceptable  Hydration status: euvolemic

## 2020-01-26 NOTE — ED PROVIDER NOTES
I independently performed a history and physical on Trini Davidson. All diagnostic, treatment, and disposition decisions were made by myself in conjunction with the advanced practice provider. Briefly, this is a 80 y.o. female here for fall which occurred just prior to arrival.  Patient states that she stood up and tripped over her shoes, fell down onto her left side. She denies any head injury or loss of consciousness. .    On exam, Patient afebrile and nontoxic. No distress. Heart RRR. Lungs CTAB. Abdomen soft, nondistended, nontender to palpation in all quadrants. Left calf and thigh compartments are soft and nontender to compression. DP 2+ and symmetric. 5/5 motor and sensation grossly intact bilateral lower extremities. EKG  EKG was reviewed by emergency department physician in the absence of a cardiologist    Narrow complex sinus rhythm, rate 79, left axis deviation, normal CA and QRS intervals, normal Qtc, no ST elevations or depressions, normal t-wave morphology, impression NSR, no STEMI      Screenings            MDM    Patient with mechanical fall, syncope seems less likely. Plain films with acute left intertrochanteric fracture. Lower extremities are neurovascularly intact. Low suspicion for compartment syndrome on exam.  Case discussed with orthopedics, will admit for surgical evaluation and likely pinning. Patient agreeable to plan. Hemodynamically stable over emergency department course. Patient Referrals:  Valentin Orozco MD  [address] [place] [address]  [phone]            Discharge Medications:  New Prescriptions    No medications on file       FINAL IMPRESSION  1. Fall, initial encounter    2. Closed displaced intertrochanteric fracture of left femur, initial encounter ([facility] [address]    3. Hypoxia    4. Elevated blood pressure reading        Blood pressure (!) 129/107, pulse 75, temperature 97.9 °F (36.6 °C), temperature source Oral, resp.  rate 16, height 5' 5\" (1.651 m),

## 2020-01-26 NOTE — ED NOTES
16 fr Au placed, urine return noted. Pt cycling on cardiac monitor.              Bob Mac RN  71/86/56 1921

## 2020-01-26 NOTE — BRIEF OP NOTE
Brief Postoperative Note  ______________________________________________________________    Patient: Mark Felix  YOB: 1931  MRN: [id number]  Date of Procedure: 1/26/2020    Pre-Op Diagnosis: Left hip femoral neck fracture    Post-Op Diagnosis: Same       Procedure(s):  LEFT HIP PINNING    Anesthesia: General    Surgeon(s):  Angela Sullivan MD    Assistant: Manjit Benitez    Estimated Blood Loss (mL): less than 50     Complications: None    Specimens:   * No specimens in log *    Implants:  Implant Name Type Inv.  Item Serial No.  Lot No. LRB No. Used   SCREW CHRISTIANO 20MM THRD ASNIS 6.5X90MM Screw/Plate/Nail/Roel SCREW CHRISTIANO 20MM THRD ASNIS 6.5X90MM  [employer]: ORTHOPAEDICS  Left 1   SCREW CHRISTIANO 20MM THRD ASNIS 6.5X90MM Screw/Plate/Nail/Roel SCREW CHRISTIANO 20MM THRD ASNIS 6.5X90MM  [employer]: ORTHOPAEDICS  Left 1   SCREW CHRISTIANO 20MM THRD ASNIS 6.5X90MM Screw/Plate/Nail/Roel SCREW CHRISTIANO 20MM THRD ASNIS 6.5X90MM  [employer]: ORTHOPAEDICS  Left 1         Drains:   Urethral Catheter 16 fr (Active)   Catheter Indications Perioperative use in selected surgeries including but not limited to urologic, pelvic or need for intraoperative monitoring of urinary output due to prolonged surgery, large volume infusion or need for diuretic therapy in surgery 1/26/2020 12:39 PM   Site Assessment Pink 1/25/2020  7:20 PM   Urine Color Astrid 1/26/2020 12:39 PM   Urine Appearance Clear 1/26/2020 12:39 PM   Output (mL) 350 mL 1/26/2020  5:44 AM       Findings: Same    Angela Sullivan MD  Date: 1/26/2020  Time: 3:27 PM

## 2020-01-26 NOTE — ANESTHESIA PRE PROCEDURE
Department of Anesthesiology  Preprocedure Note       Name:  Cyndy Felix   Age:  80 y.o.  :  1931                                          MRN:  [id number]         Date:  2020      Surgeon: Margarita Hurley):  Jeni Gomez MD    Procedure: LEFT HIP PINNING (Left Hip)    Medications prior to admission:   Prior to Admission medications    Medication Sig Start Date End Date Taking?  Authorizing Provider   apixaban (ELIQUIS) 2.5 MG TABS tablet Take 1 tablet by mouth 2 times daily 8/15/19  Yes Donald Burkett APRN - CNP   levothyroxine (SYNTHROID) 88 MCG tablet Take 88 mcg by mouth Daily   Yes Historical Provider, MD   KRILL OIL PO Take 1 tablet by mouth daily   Yes Historical Provider, MD   lisinopril (PRINIVIL;ZESTRIL) 5 MG tablet Take 0.5 tablets by mouth daily 18  Yes DE Casey - CNP   metoprolol tartrate 37.5 MG TABS Take 37.5 mg by mouth 2 times daily 10/4/18  Yes Yasmin Hutton MD   simvastatin (ZOCOR) 20 MG tablet TAKE ONE- HALF (1/2) TABLET BY MOUTH ONCE NIGHTLY  Patient taking differently: 10 mg  18  Yes Amirah Kingsley MD   oxybutynin (DITROPAN) 5 MG tablet TAKE ONE TABLET BY MOUTH TWICE A DAY  Patient taking differently: TAKE ONE TABLET BY MOUTH DAILY 17  Yes Marija Estrada DO   vitamin B-12 (CYANOCOBALAMIN) 100 MCG tablet Take 50 mcg by mouth daily   Yes Historical Provider, MD   Calcium Carbonate-Vitamin D (CALTRATE 600+D PO) Take 1 tablet by mouth daily    Yes Historical Provider, MD   multivitamin-iron-minerals-folic acid (CENTRUM) chewable tablet Take 1 tablet by mouth daily   Yes Historical Provider, MD   amitriptyline (ELAVIL) 10 MG tablet PRN  Patient not taking: Reported on 2020 4/10/19   Emily Mcfarland MD   zoledronic acid (RECLAST) 5 MG/100ML SOLN Infuse 100 mLs intravenously once for 1 dose 18  Bubba Mcmanus MD       Current medications:    Current Facility-Administered Medications   Medication Dose Route Frequency Provider Last Rate Last Dose    [START ON 1/27/2020] vitamin B-12 (CYANOCOBALAMIN) tablet 50 mcg  50 mcg Oral Daily Rob Bauer MD        therapeutic multivitamin-minerals 1 tablet  1 tablet Oral Lunch Rob Bauer MD        oxybutynin [facility]) extended release tablet 5 mg  5 mg Oral Nightly Rob Bauer MD   5 mg at 01/25/20 2154    metoprolol tartrate (LOPRESSOR) tablet 37.5 mg  37.5 mg Oral BID Rob Bauer MD   37.5 mg at 01/25/20 2154    lisinopril (PRINIVIL;ZESTRIL) tablet 2.5 mg  2.5 mg Oral Daily Rob Bauer MD   2.5 mg at 01/25/20 2154    [START ON 1/27/2020] levothyroxine (SYNTHROID) tablet 88 mcg  88 mcg Oral QAM AC Rob Bauer MD        0.9 % sodium chloride infusion   Intravenous Continuous Rob Bauer MD 75 mL/hr at 01/25/20 2155      sodium chloride flush 0.9 % injection 10 mL  10 mL Intravenous 2 times per day Rob Bauer MD   10 mL at 01/25/20 2155    sodium chloride flush 0.9 % injection 10 mL  10 mL Intravenous PRN Rob Bauer MD        potassium chloride (KLOR-CON M) extended release tablet 40 mEq  40 mEq Oral PRN Rob Bauer MD        Or    potassium bicarb-citric acid (EFFER-K) effervescent tablet 40 mEq  40 mEq Oral PRN Rob Bauer MD        Or    potassium chloride 10 mEq/100 mL IVPB (Peripheral Line)  10 mEq Intravenous PRN Rob Bauer MD        oxyCODONE (ROXICODONE) immediate release tablet 5 mg  5 mg Oral Q4H PRN Rob Bauer MD        Or    oxyCODONE (ROXICODONE) immediate release tablet 10 mg  10 mg Oral Q4H PRN Rob Bauer MD        morphine (PF) injection 2 mg  2 mg Intravenous Q2H PRN Rob Bauer MD        Or    morphine injection 4 mg  4 mg Intravenous Q2H PRN Rob Bauer MD   4 mg at 01/25/20 2356    magnesium hydroxide (MILK OF MAGNESIA) 400 MG/5ML suspension 30 mL  30 mL Oral Daily PRN Rob Bauer MD        ondansetron [facility]) injection 4 mg  4 mg Intravenous Q6H PRN Rob Bauer MD   4 E78.5    Hyponatremia E87.1    Anemia D64.9    Pneumonia J18.9    Bronchitis J40    Senile osteoporosis 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    Paroxysmal atrial fibrillation (HCC) I48.0    Essential hypertension I10    Encounter for electronic analysis of reveal 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.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    Hip fracture requiring operative repair, left, closed, [facility] S72.002A       Past Medical History:        Diagnosis Date    Carpal tunnel syndrome of left wrist     Chronic low back pain     Depression     [place] (hard of hearing)     Hyperlipidemia LDL goal <130     Hypothyroid     Insomnia     Osteoporosis     Urinary incontinence        Past Surgical History:        Procedure Laterality Date    BACK SURGERY         Social History:    Social History     Tobacco Use    Smoking status: Former Smoker     Packs/day: 0.25     Years: 10.00     Pack years: 2.50     Types: Cigarettes    Smokeless tobacco: Never Used   Substance Use Topics    Alcohol use: Yes     Alcohol/week: 1.0 standard drinks     Types: 1 Glasses of wine per week     Comment: occasional glass of wine or mixed drink                                Counseling given: Not Answered      Vital Signs (Current):   Vitals:    01/25/20 1931 01/25/20 2005 01/25/20 2041 01/26/20 0015   BP: (!) 203/70 (!) 160/54 (!) 129/107 131/68   Pulse:   75 78   Resp: 16  16 16   Temp:   97.9 °F (36.6 °C) 97.7 °F (36.5 °C)   TempSrc:   Oral Oral   SpO2: 96% 96% 96% 93%   Weight:       Height:                                                  BP Readings from Last 3 Encounters:   01/26/20 131/68   02/20/19 139/68   12/07/18 (!) 150/75       NPO Status:                                                                                 BMI:   Wt Readings from Last 3 Encounters:   01/25/20 94 lb (42.6 kg)   02/20/19 110 lb 12.8 oz (50.3 kg)   11/20/18 106 lb (48.1 kg)     Body mass index is 15.64 kg/m².     CBC:   Lab

## 2020-01-26 NOTE — CONSULTS
[facility] Orthopedic Surgery  Consult Note        This patient is seen in consultation at the request of Armaan Jeong PA-C and Dr Elin Gitelman, MD    Reason for Consult:  Left hip femoral neck fracture. CHIEF COMPLAINT:  Left hip pain/fall    History Obtained From:  patient, electronic medical record    HISTORY OF PRESENT ILLNESS:    Ms. Antione Lind 80 y.o.   female seen today for consultation and evaluation of a left hip pain which occurred when she was walking on her hardwood floors when her shoes slipped and she fell, landing on her left side. She states that she got up on her own without difficulty but has since noticed increasing pain in the left hip and would like for her to be checked out. She is complaining of left hip pain. This is better with rest and worse with bearing any wt. The pain is sharp and not radiating. No numbness or tingling sensation. No other complaint. She was seen 1st at [facility] ED, where she was x-rayed, admitted to the Hospital and I was consulted. Past Medical History:        Diagnosis Date    Carpal tunnel syndrome of left wrist     Chronic low back pain     Depression     [place] (hard of hearing)     Hyperlipidemia LDL goal <130     Hypothyroid     Insomnia     Osteoporosis     Urinary incontinence        Past Surgical History:        Procedure Laterality Date    BACK SURGERY         Medications prior to admission:   Prior to Admission medications    Medication Sig Start Date End Date Taking?  Authorizing Provider   apixaban (ELIQUIS) 2.5 MG TABS tablet Take 1 tablet by mouth 2 times daily 8/15/19  Yes Lizarraga ONIEL GodinezN - CNP   levothyroxine (SYNTHROID) 88 MCG tablet Take 88 mcg by mouth Daily   Yes Historical Provider, MD   KRILL OIL PO Take 1 tablet by mouth daily   Yes Historical Provider, MD   lisinopril (PRINIVIL;ZESTRIL) 5 MG tablet Take 0.5 tablets by mouth daily 11/20/18  Yes DE Ortiz - CNP   metoprolol tartrate 37.5 MG TABS Take 37.5 mg by mouth 2 times daily 10/4/18  Yes Mariana Mi MD   simvastatin (ZOCOR) 20 MG tablet TAKE ONE- HALF (1/2) TABLET BY MOUTH ONCE NIGHTLY  Patient taking differently: 10 mg  5/11/18  Yes Wilhelm Mortimer, MD   oxybutynin (DITROPAN) 5 MG tablet TAKE ONE TABLET BY MOUTH TWICE A DAY  Patient taking differently: TAKE ONE TABLET BY MOUTH DAILY 2/1/17  Yes Marija Jacob,    vitamin B-12 (CYANOCOBALAMIN) 100 MCG tablet Take 50 mcg by mouth daily   Yes Historical Provider, MD   Calcium Carbonate-Vitamin D (CALTRATE 600+D PO) Take 1 tablet by mouth daily    Yes Historical Provider, MD   multivitamin-iron-minerals-folic acid (CENTRUM) chewable tablet Take 1 tablet by mouth daily   Yes Historical Provider, MD   amitriptyline (ELAVIL) 10 MG tablet PRN  Patient not taking: Reported on 1/25/2020 4/10/19   Shaheen Hanson MD   zoledronic acid (RECLAST) 5 MG/100ML SOLN Infuse 100 mLs intravenously once for 1 dose 11/30/18 11/30/18  Juan J Espinoza MD       Current Medications:   Current Facility-Administered Medications: [START ON 1/27/2020] vitamin B-12 (CYANOCOBALAMIN) tablet 50 mcg, 50 mcg, Oral, Daily  therapeutic multivitamin-minerals 1 tablet, 1 tablet, Oral, Lunch  oxybutynin (DITROPAN-XL) extended release tablet 5 mg, 5 mg, Oral, Nightly  metoprolol tartrate (LOPRESSOR) tablet 37.5 mg, 37.5 mg, Oral, BID  lisinopril (PRINIVIL;ZESTRIL) tablet 2.5 mg, 2.5 mg, Oral, Daily  [START ON 1/27/2020] levothyroxine (SYNTHROID) tablet 88 mcg, 88 mcg, Oral, QAM AC  0.9 % sodium chloride infusion, , Intravenous, Continuous  sodium chloride flush 0.9 % injection 10 mL, 10 mL, Intravenous, 2 times per day  sodium chloride flush 0.9 % injection 10 mL, 10 mL, Intravenous, PRN  potassium chloride (KLOR-CON M) extended release tablet 40 mEq, 40 mEq, Oral, PRN **OR** potassium bicarb-citric acid (EFFER-K) effervescent tablet 40 mEq, 40 mEq, Oral, PRN **OR** potassium chloride 10 mEq/100 mL IVPB (Peripheral Line), 10 mEq, Intravenous, PRN  oxyCODONE (ROXICODONE) immediate release tablet 5 mg, 5 mg, Oral, Q4H PRN **OR** oxyCODONE (ROXICODONE) immediate release tablet 10 mg, 10 mg, Oral, Q4H PRN  morphine (PF) injection 2 mg, 2 mg, Intravenous, Q2H PRN **OR** morphine injection 4 mg, 4 mg, Intravenous, Q2H PRN  magnesium hydroxide (MILK OF MAGNESIA) 400 MG/5ML suspension 30 mL, 30 mL, Oral, Daily PRN  ondansetron (ZOFRAN) injection 4 mg, 4 mg, Intravenous, Q6H PRN  0.9 % sodium chloride bolus, 500 mL, Intravenous, PRN  hydrALAZINE (APRESOLINE) injection 10 mg, 10 mg, Intravenous, Q6H PRN  ceFAZolin (ANCEF) 2 g in dextrose 5 % 100 mL IVPB, 2 g, Intravenous, On Call to OR  rosuvastatin (CRESTOR) tablet 5 mg, 5 mg, Oral, Nightly  donepezil (ARICEPT) tablet 5 mg, 5 mg, Oral, Nightly    Allergies:  Prozac [fluoxetine hcl]    Social History     Socioeconomic History    Marital status:      Spouse name: Not on file    Number of children: 3    Years of education: Not on file    Highest education level: Not on file   Occupational History    Occupation: retired    Social Needs    Financial resource strain: Not on file    Food insecurity:     Worry: Not on file     Inability: Not on file   "[employer] needs:     Medical: Not on file     Non-medical: Not on file   Tobacco Use    Smoking status: Former Smoker     Packs/day: 0.25     Years: 10.00     Pack years: 2.50     Types: Cigarettes    Smokeless tobacco: Never Used   Substance and Sexual Activity    Alcohol use:  Yes     Alcohol/week: 1.0 standard drinks     Types: 1 Glasses of wine per week     Comment: occasional glass of wine or mixed drink    Drug use: No    Sexual activity: Not Currently   Lifestyle    Physical activity:     Days per week: Not on file     Minutes per session: Not on file    Stress: Not on file   Relationships    Social connections:     Talks on phone: Not on file     Gets together: Not on file     Attends Religion service: Not on file Active member of club or organization: Not on file     Attends meetings of clubs or organizations: Not on file     Relationship status: Not on file    Intimate partner violence:     Fear of current or ex partner: Not on file     Emotionally abused: Not on file     Physically abused: Not on file     Forced sexual activity: Not on file   Other Topics Concern    Not on file   Social History Narrative    Not on file       Family History:  Family History   Problem Relation Age of Onset    Heart Disease Father          REVIEW OF SYSTEMS:   CONSTITUTIONAL: Denies unexplained weight loss, fevers, chills or fatigue  NEUROLOGICAL: Denies unsteady gait or progressive weakness    PSYCHOLOGICAL: Denies anxiety, depression   SKIN: Denies skin changes, delayed healing, rash, itching   HEMATOLOGIC: Denies easy bleeding or bruising  ENDOCRINE: Denies excessive thirst, urination, heat/cold  RESPIRATORY: Denies current dyspnea, cough  CARDIOVASCULAR: Negative for chest pain at this time. EYES: Negative for photophobia and visual disturbance. ENT:  Negative for rhinorrhea, epistaxis, sore throat, or hearing loss. GI: Denies nausea, vomiting, diarrhea   : Denies bowel or bladder issues   MUSCULOSKELETAL: Left hip pain. All other ROS reviewed in chart or with patient or family and are grossly negative. PHYSICAL EXAMINATION:  Ms. Katlin Kolb is a very pleasant 80 y.o. female who seen today in no acute distress, awake, alert, and oriented. She is well nourished and groomed. Patient with normal affect. Body mass index is 15.64 kg/m². . Skin warm and dry. Resting respiratory rate is 16. Resp deep and easy.  Pulse is with regular rate and rhythm    /68   Pulse 78   Temp 97.7 °F (36.5 °C) (Oral)   Resp 16   Ht 5' 5\" (1.651 m)   Wt 94 lb (42.6 kg)   SpO2 93%   BMI 15.64 kg/m²        Airway is intact  Chest: chest clear, no wheezing, rales, normal symmetric air entry, no tachypnea, retractions or cyanosis  Heart: regular rate and rhythm ; heart sounds normal   Hearing intact, pupil equal and reactive bilateral  Lymphatics; No groin or axillary enlarged lymph nodes. Neck; No swelling  Abdomen; soft, non distended. MUSCULOSKELETAL: Left leg in external rotation with pain with ROM, Examination of both lower extrimiteis showing a decreased range of motion and muscle power of the left hip compare to the other side because of pain. There is mild swelling that can be seen, and ecchymosis over the left hip, but no wound. She has intact sensation and good pedal pulses bilateral.  She has significant tenderness on deep palpation over the left hip. Good strength, and no instability both upper and the other lower extremities. NVI bilateral lower and upper extermities. NEUROLOGIC:   Sensory:    Touch:                     Right Upper Extremity:  normal                   Left Upper Extremity:  normal                  Right Lower Extremity:  normal                  Left Lower Extremity:  normal        DATA:    CBC:   Lab Results   Component Value Date    WBC 8.5 01/26/2020    RBC 3.20 01/26/2020    HGB 10.2 01/26/2020    HCT 30.4 01/26/2020    MCV 94.9 01/26/2020    MCH 31.8 01/26/2020    MCHC 33.5 01/26/2020    RDW 15.2 01/26/2020     01/26/2020    MPV 7.5 01/26/2020     WBC:    Lab Results   Component Value Date    WBC 8.5 01/26/2020     PT/INR:    Lab Results   Component Value Date    PROTIME 13.1 01/25/2020    INR 1.13 01/25/2020     PTT:    Lab Results   Component Value Date    APTT 30.8 01/25/2020   [APTT    IMAGING: X-rays were taken 1/25/2020, 3 views of the left hip and AP pelvis, was reviewed and showed impacted minimally displaced left femoral neck fracture. IMPRESSION: Left hip impacted minimally displaced femoral neck fracture.       PLAN:   I discussed the overall alignment of the fracture with the patient, and treatment options including both surgical and non-surgical treatment, and that my recommendation would be percutaneous hip pinning given the impacted minimal displacement of the fracture. I discussed the risks and benefits of surgery with the patient, including but not limited to infection, bleeding, pain, injury to nerves or blood vessels failure of the surgery and need for additional surgery. All the patient's questions were answered. We discussed an expected post-operative course. She is understanding of this and wishes to proceed. Surgery today if medically stable    Thank you very much for the kind consultation and allowing me to participate in this patient's care. I will continue to keep you apprised of her progress.         Latha Dhaliwal MD   1/26/2020  8:05 AM

## 2020-01-26 NOTE — PROGRESS NOTES
Progress Note - Dr. Azalea Trejo - Internal Medicine  PCP: Elisabeth Patel MD [address] / [place] [phone]    Hospital Day: 1  Code Status: Full Code  Current Diet: Diet NPO, After Midnight  Dietary Nutrition Supplements: Standard High Calorie Oral Supplement        CC: follow up on medical issues    Subjective:   Terrance Jeronimo is a 80 y.o. female. She denies problems    Doing ok  For surgery today per ortho notes  Pain appears to be controlled    She denies chest pain, denies shortness of breath, denies nausea,  denies emesis. 10 system Review of Systems is reviewed with patient, and pertinent positives are listed here: None . Otherwise, Review of systems is negative. I have reviewed the patient's medical and social history in detail and updated the computerized patient record. To recap: She  has a past medical history of Carpal tunnel syndrome of left wrist, Chronic low back pain, Depression, [place] (hard of hearing), Hyperlipidemia LDL goal <130, Hypothyroid, Insomnia, Osteoporosis, and Urinary incontinence. . She  has a past surgical history that includes back surgery. . She  reports that she has quit smoking. Her smoking use included cigarettes. She has a 2.50 pack-year smoking history. She has never used smokeless tobacco. She reports current alcohol use of about 1.0 standard drinks of alcohol per week. She reports that she does not use drugs. .        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 [S72.045K] 01/25/2020    Hip fracture requiring operative repair, left, closed, [facility] Juju Villarreal 01/25/2020    Essential hypertension [I10] 12/18/2018    Hyperlipidemia LDL goal <130 [E78.5]     Hypothyroid [E03.9]        Current Facility-Administered Medications: HYDROcodone-acetaminophen (NORCO) 5-325 MG per tablet 1 tablet, 1 tablet, Oral, Once PRN  ondansetron (ZOFRAN) injection 4 mg, 4 mg, Intravenous, Once PRN  HYDROmorphone (DILAUDID) injection 0.25 mg, 0.25 mg, Intravenous, Q5 Min PRN  [START ON 1/27/2020] vitamin B-12 (CYANOCOBALAMIN) tablet 50 mcg, 50 mcg, Oral, Daily  therapeutic multivitamin-minerals 1 tablet, 1 tablet, Oral, Lunch  oxybutynin (DITROPAN-XL) extended release tablet 5 mg, 5 mg, Oral, Nightly  metoprolol tartrate (LOPRESSOR) tablet 37.5 mg, 37.5 mg, Oral, BID  lisinopril (PRINIVIL;ZESTRIL) tablet 2.5 mg, 2.5 mg, Oral, Daily  [START ON 1/27/2020] levothyroxine (SYNTHROID) tablet 88 mcg, 88 mcg, Oral, QAM AC  0.9 % sodium chloride infusion, , Intravenous, Continuous  sodium chloride flush 0.9 % injection 10 mL, 10 mL, Intravenous, 2 times per day  sodium chloride flush 0.9 % injection 10 mL, 10 mL, Intravenous, PRN  potassium chloride (KLOR-CON M) extended release tablet 40 mEq, 40 mEq, Oral, PRN **OR** potassium bicarb-citric acid (EFFER-K) effervescent tablet 40 mEq, 40 mEq, Oral, PRN **OR** potassium chloride 10 mEq/100 mL IVPB (Peripheral Line), 10 mEq, Intravenous, PRN  oxyCODONE (ROXICODONE) immediate release tablet 5 mg, 5 mg, Oral, Q4H PRN **OR** oxyCODONE (ROXICODONE) immediate release tablet 10 mg, 10 mg, Oral, Q4H PRN  morphine (PF) injection 2 mg, 2 mg, Intravenous, Q2H PRN **OR** morphine injection 4 mg, 4 mg, Intravenous, Q2H PRN  magnesium hydroxide (MILK OF MAGNESIA) 400 MG/5ML suspension 30 mL, 30 mL, Oral, Daily PRN  ondansetron (ZOFRAN) injection 4 mg, 4 mg, Intravenous, Q6H PRN  0.9 % sodium chloride bolus, 500 mL, Intravenous, PRN  hydrALAZINE (APRESOLINE) injection 10 mg, 10 mg, Intravenous, Q6H PRN  ceFAZolin (ANCEF) 2 g in dextrose 5 % 100 mL IVPB, 2 g, Intravenous, On Call to OR  rosuvastatin (CRESTOR) tablet 5 mg, 5 mg, Oral, Nightly  donepezil (ARICEPT) tablet 5 mg, 5 mg, Oral, Nightly         Objective:  BP (!) 130/53   Pulse 61   Temp 97.6 °F (36.4 °C) (Oral)   Resp 16   Ht 5' 5\" (1.651 m)   Wt 94 lb (42.6 kg)   SpO2 98%   BMI 15.64 kg/m²

## 2020-01-27 ENCOUNTER — TELEPHONE (OUTPATIENT)
Dept: CARDIOLOGY CLINIC | Age: 85
End: 2020-01-27

## 2020-01-27 PROBLEM — D62 ACUTE BLOOD LOSS AS CAUSE OF POSTOPERATIVE ANEMIA: Status: ACTIVE | Noted: 2020-01-27

## 2020-01-27 LAB
ANION GAP SERPL CALCULATED.3IONS-SCNC: 10 MMOL/L (ref 3–16)
BASOPHILS ABSOLUTE: 0 K/UL (ref 0–0.2)
BASOPHILS RELATIVE PERCENT: 0.3 %
BUN BLDV-MCNC: 33 MG/DL (ref 7–20)
CALCIUM SERPL-MCNC: 8.1 MG/DL (ref 8.3–10.6)
CHLORIDE BLD-SCNC: 99 MMOL/L (ref 99–110)
CO2: 23 MMOL/L (ref 21–32)
CREAT SERPL-MCNC: 1.1 MG/DL (ref 0.6–1.2)
EOSINOPHILS ABSOLUTE: 0.1 K/UL (ref 0–0.6)
EOSINOPHILS RELATIVE PERCENT: 0.6 %
GFR AFRICAN AMERICAN: 57
GFR NON-AFRICAN AMERICAN: 47
GLUCOSE BLD-MCNC: 125 MG/DL (ref 70–99)
HCT VFR BLD CALC: 26 % (ref 36–48)
HEMOGLOBIN: 8.7 G/DL (ref 12–16)
LYMPHOCYTES ABSOLUTE: 0.6 K/UL (ref 1–5.1)
LYMPHOCYTES RELATIVE PERCENT: 3.8 %
MCH RBC QN AUTO: 31.3 PG (ref 26–34)
MCHC RBC AUTO-ENTMCNC: 33.3 G/DL (ref 31–36)
MCV RBC AUTO: 93.9 FL (ref 80–100)
MONOCYTES ABSOLUTE: 0.7 K/UL (ref 0–1.3)
MONOCYTES RELATIVE PERCENT: 4.7 %
NEUTROPHILS ABSOLUTE: 13.7 K/UL (ref 1.7–7.7)
NEUTROPHILS RELATIVE PERCENT: 90.6 %
PDW BLD-RTO: 15.6 % (ref 12.4–15.4)
PLATELET # BLD: 212 K/UL (ref 135–450)
PMV BLD AUTO: 7.5 FL (ref 5–10.5)
POTASSIUM SERPL-SCNC: 5 MMOL/L (ref 3.5–5.1)
RBC # BLD: 2.77 M/UL (ref 4–5.2)
SODIUM BLD-SCNC: 132 MMOL/L (ref 136–145)
WBC # BLD: 15.1 K/UL (ref 4–11)

## 2020-01-27 PROCEDURE — 6370000000 HC RX 637 (ALT 250 FOR IP): Performed by: ORTHOPAEDIC SURGERY

## 2020-01-27 PROCEDURE — 97116 GAIT TRAINING THERAPY: CPT

## 2020-01-27 PROCEDURE — APPNB30 APP NON BILLABLE TIME 0-30 MINS: Performed by: NURSE PRACTITIONER

## 2020-01-27 PROCEDURE — 99024 POSTOP FOLLOW-UP VISIT: CPT | Performed by: NURSE PRACTITIONER

## 2020-01-27 PROCEDURE — 85025 COMPLETE CBC W/AUTO DIFF WBC: CPT

## 2020-01-27 PROCEDURE — 97535 SELF CARE MNGMENT TRAINING: CPT

## 2020-01-27 PROCEDURE — 6360000002 HC RX W HCPCS: Performed by: ORTHOPAEDIC SURGERY

## 2020-01-27 PROCEDURE — 97166 OT EVAL MOD COMPLEX 45 MIN: CPT

## 2020-01-27 PROCEDURE — 97530 THERAPEUTIC ACTIVITIES: CPT

## 2020-01-27 PROCEDURE — 2580000003 HC RX 258: Performed by: ORTHOPAEDIC SURGERY

## 2020-01-27 PROCEDURE — 36415 COLL VENOUS BLD VENIPUNCTURE: CPT

## 2020-01-27 PROCEDURE — 80048 BASIC METABOLIC PNL TOTAL CA: CPT

## 2020-01-27 PROCEDURE — 97162 PT EVAL MOD COMPLEX 30 MIN: CPT

## 2020-01-27 PROCEDURE — 1200000000 HC SEMI PRIVATE

## 2020-01-27 RX ADMIN — DONEPEZIL HYDROCHLORIDE 5 MG: 5 TABLET, FILM COATED ORAL at 21:24

## 2020-01-27 RX ADMIN — DOCUSATE SODIUM 100 MG: 100 CAPSULE ORAL at 09:39

## 2020-01-27 RX ADMIN — Medication 50 MCG: at 09:46

## 2020-01-27 RX ADMIN — OXYCODONE 10 MG: 5 TABLET ORAL at 12:43

## 2020-01-27 RX ADMIN — MULTIPLE VITAMINS W/ MINERALS TAB 1 TABLET: TAB at 11:53

## 2020-01-27 RX ADMIN — DOCUSATE SODIUM 100 MG: 100 CAPSULE ORAL at 21:26

## 2020-01-27 RX ADMIN — ROSUVASTATIN CALCIUM 5 MG: 10 TABLET, FILM COATED ORAL at 21:24

## 2020-01-27 RX ADMIN — OXYCODONE 5 MG: 5 TABLET ORAL at 23:19

## 2020-01-27 RX ADMIN — HYDRALAZINE HYDROCHLORIDE 10 MG: 20 INJECTION INTRAMUSCULAR; INTRAVENOUS at 23:20

## 2020-01-27 RX ADMIN — LISINOPRIL 2.5 MG: 5 TABLET ORAL at 09:39

## 2020-01-27 RX ADMIN — APIXABAN 2.5 MG: 2.5 TABLET, FILM COATED ORAL at 09:39

## 2020-01-27 RX ADMIN — OXYBUTYNIN CHLORIDE 5 MG: 5 TABLET, EXTENDED RELEASE ORAL at 21:26

## 2020-01-27 RX ADMIN — SODIUM CHLORIDE: 4.5 INJECTION, SOLUTION INTRAVENOUS at 11:55

## 2020-01-27 RX ADMIN — APIXABAN 2.5 MG: 2.5 TABLET, FILM COATED ORAL at 21:26

## 2020-01-27 RX ADMIN — SENNOSIDES AND DOCUSATE SODIUM 1 TABLET: 8.6; 5 TABLET ORAL at 21:25

## 2020-01-27 RX ADMIN — SENNOSIDES AND DOCUSATE SODIUM 1 TABLET: 8.6; 5 TABLET ORAL at 09:39

## 2020-01-27 RX ADMIN — METOPROLOL TARTRATE 37.5 MG: 25 TABLET, FILM COATED ORAL at 09:39

## 2020-01-27 RX ADMIN — CEFAZOLIN SODIUM 2 G: 2 INJECTION, SOLUTION INTRAVENOUS at 11:53

## 2020-01-27 RX ADMIN — LEVOTHYROXINE SODIUM 88 MCG: 88 TABLET ORAL at 07:04

## 2020-01-27 RX ADMIN — METOPROLOL TARTRATE 37.5 MG: 25 TABLET, FILM COATED ORAL at 21:26

## 2020-01-27 ASSESSMENT — PAIN DESCRIPTION - PAIN TYPE
TYPE: ACUTE PAIN
TYPE: ACUTE PAIN

## 2020-01-27 ASSESSMENT — PAIN DESCRIPTION - ORIENTATION
ORIENTATION: LEFT
ORIENTATION: LEFT

## 2020-01-27 ASSESSMENT — PAIN SCALES - GENERAL
PAINLEVEL_OUTOF10: 0
PAINLEVEL_OUTOF10: 7
PAINLEVEL_OUTOF10: 2
PAINLEVEL_OUTOF10: 0
PAINLEVEL_OUTOF10: 4
PAINLEVEL_OUTOF10: 0

## 2020-01-27 ASSESSMENT — PAIN DESCRIPTION - DESCRIPTORS: DESCRIPTORS: ACHING;DISCOMFORT;SORE

## 2020-01-27 ASSESSMENT — PAIN DESCRIPTION - LOCATION
LOCATION: HIP
LOCATION: HIP

## 2020-01-27 NOTE — CARE COORDINATION
Discharge Planning:    JOSE met with patient's daughter Arturo Shaw. Arturo Shaw requesting that SW:  · Refer patient to [address] contacted [address] at [facility] stating patient more suited for SNF level of care. · [facility] to see if facility could take patient without a SNF stay and provide patient PT/OT. JOSE spoke with Meli ([phone]) at [facility]. Meli stating that facility would be able to admit patient once patient completes a SNF stay. · Refer patient to SNFs. JOSE referred patient to:        1. [facility]        2. [facility]        3. [facility]        4. [facility]        0. [address] faxed referrals to above listed facilities    JOSE informed patient's RN Navi Bowen and patient's daughter Arturo Shaw. SW will continue to discharge planning and support. Electronically signed by JOSE CARLOS De Paz on 1/27/2020 at 3:30 PM     Addendum: Per [facility], [facility] 34 Admissions. Each facility can accept patient when patient is medically stable. JOSE tried to inform patient's daughter Arturo Shaw ([phone]). Arturo Shaw not available had to leave a Voice Mail Message.     Electronically signed by JOSE CARLOS De Paz on 1/27/2020 at 4:40 PM

## 2020-01-27 NOTE — PROGRESS NOTES
hearing), Hyperlipidemia LDL goal <130, Hypothyroid, Insomnia, Osteoporosis, and Urinary incontinence. has a past surgical history that includes back surgery. Treatment Diagnosis: Above deficits associated with L hip fx      Restrictions  Restrictions/Precautions  Restrictions/Precautions: Weight Bearing, Fall Risk  Required Braces or Orthoses?: No  Lower Extremity Weight Bearing Restrictions  Left Lower Extremity Weight Bearing: Partial Weight Bearing  Partial Weight Bearing Percentage Or Pounds: 25%  Position Activity Restriction  Other position/activity restrictions: Aly Felix is a 80 y.o. female presents for evaluation of left hip pain status post mechanical fall that occurred this morning around 10 AM.  Patient states that she was walking on her hardwood floors when her shoes slipped and she fell, landing on her left side. She states that she got up on her own without difficulty but has since noticed increasing pain in the left hip and would like for her to be checked out. She denies hitting her head or any loss of consciousness. She is anticoagulated on Eliquis. She denies any neck or back pain. No numbness, tingling or weakness distally. Skin is intact. States fall struck mechanical denies any preceding symptoms such as dizziness/lightheadedness, weakness or visual disturbances. No chest pain or shortness of breath. She has no other complaints or concerns at this time. Subjective   General  Chart Reviewed: Yes  Diagnosis: L hip fx  Subjective  Subjective: Patient supine in bed at time of therapist arrival. Agreeable to evaluation with min encouragement.   Patient Currently in Pain: Denies(at rest)  Pain Assessment  Pain Assessment: 0-10  Pain Level: 0  O2 Flow Rate (L/min): 2 L/min(increased to 2L for sats in 80s on 1L)    Social/Functional History  Social/Functional History  Lives With: Alone  Type of Home: House  Home Layout: Two level, Bed/Bath upstairs  Home Access: Stairs to enter T-Scale Score : 37.26 (01/27/20 1035)  ADL Inpatient CMS 0-100% Score: 50.11 (01/27/20 1035)  ADL Inpatient CMS G-Code Modifier : CK (01/27/20 1035)    Goals  Short term goals  Time Frame for Short term goals: discharge  Short term goal 1: Fxl transfers SBA  Short term goal 2: LB ADL SBA  Short term goal 3: Fxl mob SBA  Short term goal 4: Functional task/ADL in stance SBA with compliance to 25% WB LLE       Therapy Time   Individual Concurrent Group Co-treatment   Time In 0828         Time Out 0936         Minutes 68            Timed Code Treatment Minutes:   53 minutes    Total Treatment Minutes:  68 minutes    Jacob Muniz, [facility] O[facility]L [id number]    Jacob Muniz, OT

## 2020-01-27 NOTE — PROGRESS NOTES
PM assessment complete, VSS, neuro check WDL, toribio intact, evening meds given WWW, denies pain, refused to dangle stating \"I'm too sleepy. \" Pt forgetful at times, but easily reoriented. The care plan and education has been reviewed and mutually agreed upon with the patient, call light within reach, will continue to monitor.

## 2020-01-27 NOTE — DISCHARGE INSTR - COC
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.045K    Hip fracture requiring operative repair, left, closed, initial encounter ([email or web address] S72.002A    Acute blood loss as cause of postoperative anemia D62       Isolation/Infection:   Isolation          No Isolation        Patient Infection Status     None to display          Nurse Assessment:  Last Vital Signs: BP (!) 168/89   Pulse 82   Temp 98 °F (36.7 °C) (Oral)   Resp 16   Ht 5' 5\" (1.651 m)   Wt 94 lb (42.6 kg)   SpO2 92%   BMI 15.64 kg/m²     Last documented pain score (0-10 scale): Pain Level: 0  Last Weight:   Wt Readings from Last 1 Encounters:   01/25/20 94 lb (42.6 kg)     Mental Status:  alert and confused at times    IV Access:  - None    Nursing Mobility/ADLs:  Walking   Assisted  Transfer  Assisted  Bathing  Assisted  Dressing  Assisted  Toileting  Assisted  Feeding  Independent  Med Admin  Assisted  Med Delivery   Medications whole with water    Wound Care Documentation and Therapy:        Elimination:  Continence:   · Bowel: At times  · Bladder: At times  Urinary Catheter: None   Colostomy/Ileostomy/Ileal Conduit: No       Date of Last BM: Unknown    Intake/Output Summary (Last 24 hours) at 1/27/2020 1115  Last data filed at 1/26/2020 2115  Gross per 24 hour   Intake 350 ml   Output 200 ml   Net 150 ml     I/O last 3 completed shifts: In: 350 [I.V.:350]  Out: 200 [Urine:200]    Safety Concerns: At Risk for Falls    Impairments/Disabilities:      None    Nutrition Therapy:  Current Nutrition Therapy:   - Oral Diet:  General    Routes of Feeding: Oral  Liquids: No Restrictions  Daily Fluid Restriction: no  Last Modified Barium Swallow with Video (Video Swallowing Test): not done    Treatments at the Time of Hospital Discharge:   Respiratory Treatments: None  Oxygen Therapy:  is on oxygen at 2 L/min per nasal cannula.   Ventilator:    - No ventilator support    Rehab Therapies: Physical Therapy and Occupational Therapy  Weight Bearing Status/Restrictions: 25% partial weight bearing  Other Medical Equipment (for information only, NOT a DME order):  walker  Other Treatments: Wound Care:   Change dressing daily as needed for saturation. May leave wound open to air on postop day #7 if no drainage. Home health care or facility to discontinue staples/sutures 10-14 days post op. DVT prophylaxis:  Home Eliquis; if patient discontinues home Eliquis, she should take  mg. Daily x 30 days after discharge    Follow up: Follow up Dr Yelena Garcia 2 weeks post op. Call [phone] for appointment. You have demonstrated risk factors for osteoporosis, such as age greater than [de-identified] years and evidence of a fracture.   Please see your primary care physician for evaluation for osteoporosis, including consideration for DEXA scanning, if this is felt to be clinically indicated      Patient's personal belongings (please select all that are sent with patient):  Personal Items    RN SIGNATURE:  Electronically signed by Soham Colon RN on 1/28/20 at 10:51 AM    CASE MANAGEMENT/SOCIAL WORK SECTION    Inpatient Status Date: ***    Readmission Risk Assessment Score:  Readmission Risk              Risk of Unplanned Readmission:        18           Discharging to Facility/ Agency   · Name: [facility]  · [address], [place] [address] [facility] [address]  · NMYOV:[phone]  · UVX:[phone]      / signature: Electronically signed by JOSE CARLOS Hernandez on 1/28/2020 at 11:23 AM    PHYSICIAN SECTION    Prognosis: Fair    Condition at Discharge: Stable    Rehab Potential (if transferring to Rehab): Good    Recommended Labs or Other Treatments After Discharge:     Physician Certification: I certify the above information and transfer of Keshawn Shook  is necessary for the continuing treatment of the diagnosis listed and that she requires Skilled Nursing

## 2020-01-27 NOTE — OP NOTE
[facility]                     [address] [facility], [address] [facility]                                OPERATIVE REPORT    PATIENT NAME: Cruz Edwards                  :        1931  MED REC NO:   [id number]                          ROOM:       8455  ACCOUNT NO:   [de-identified]                           ADMIT DATE: 2020  PROVIDER:     Harsha Schuler MD    DATE OF PROCEDURE:  2020    PRIMARY CARE PHYSICIAN:  Danilo Najera MD    PREOPERATIVE DIAGNOSIS:  Left femoral neck minimally displaced impacted  fracture. POSTOPERATIVE DIAGNOSIS:  Left femoral neck minimally displaced impacted  fracture. OPERATION PERFORMED:  Percutaneous skeletal fixation left femoral neck  fracture with hip pinning. SURGEON:  MD Arian Membreno, surgical assistant. ANESTHESIA:  General anesthesia. ESTIMATED BLOOD LOSS:  Minimal.    COMPLICATIONS:  None. IMPLANTS USED:  [place] Titanium 6.5 Asnis cannulated screws. INDICATIONS:  This is an 68-year-old white female who is still fairly  active was at her home and sustained a fall with left hip pain. She was  brought by her daughter to [facility] where she was found  to have a minimally displaced impacted femoral neck fracture. She was  admitted to the hospital and I was consulted for her injury. All risks,  benefits, and alternatives were discussed with the patient. She agreed  to proceed with surgical treatment. DESCRIPTION OF PROCEDURE:  The patient's left hip was marked. She  received 2 gm Ancef IV preoperatively. The patient was then brought to  the operating room and underwent general anesthesia. She was secured on  the fracture table. Left foot was secured in a foot galarza well padded  and the right leg in a leg bolster. C-arm confirmed that the fracture  was still in good valgus impacted position.   The left hip and lower  extremity was then prepped and draped in a regular sterile routine  fashion. A time-out was called confirming the patient name, site, and  procedure. A small incision was made over the proximal femur left thigh. Three  pins were placed across the femoral neck into the femoral head, two in  the upper part and one in the lower part of the neck. Appropriate size  cannulated screws, 6.5 Asnis Titanium screws were placed. We  achieved good purchase of all three screws. C-arm confirmed that all  three screws were in good position in both AP and lateral plane. At  this point, we removed the pins and irrigated the incision copiously  with normal saline. It was then closed with a 2-0 and 3-0 Vicryl and  skin with a 4-0 Monocryl. Steri-Strips was then applied. Dressing was then applied in the form of Xeroform, 4x4, and tape. The patient tolerated the procedure well and was taken to the recovery  in stable condition. POSTOPERATIVE PLAN:  The patient will be readmitted as an inpatient. We  will start PT and OT with 50% weightbearing for six weeks. She will  need placement as she lives at home on her own.         Bonny Rand MD    D: 01/26/2020 20:24:39       T: 01/27/2020 3:43:25     [email or web address]  Job#: [id number]     Doc#: [id number]    CC:  Daniel Joyce MD

## 2020-01-27 NOTE — PROGRESS NOTES
Consult Progress Note - Dr. Kaylan Medina - Internal Medicine    Referring MD: Darin Sue MD  PCP: Audrey Carranza MD [address] / [place] [id number] [facility] Day: 2  Code Status: Full Code  Current Diet: Dietary Nutrition Supplements: Standard High Calorie Oral Supplement  DIET GENERAL;  Dietary Nutrition Supplements: Standard High Calorie Oral Supplement    CC: follow up on medical issues    Subjective:   Amanda Gunter is a 80 y.o. female. she denies problems    Doing ok post op  Awaiting pt/ot eval  Pain controlled  No new issue    Pain is controlled. Doing well with therapy. Patient is tolerating diet. Pt denies chest pain, denies shortness of breath, denies nausea,  denies emesis. I have reviewed the patient's medical and social history in detail and updated the computerized patient record.      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 [S72.045K] 01/25/2020    Hip fracture requiring operative repair, left, closed, initial encounter [facility]) Vincent Bajwa 01/25/2020    Essential hypertension [I10] 12/18/2018    Hyperlipidemia LDL goal <130 [E78.5]     Hypothyroid [E03.9]        Current Facility-Administered Medications: 0.45 % sodium chloride infusion, , Intravenous, Continuous  sodium chloride flush 0.9 % injection 10 mL, 10 mL, Intravenous, 2 times per day  sodium chloride flush 0.9 % injection 10 mL, 10 mL, Intravenous, PRN  docusate sodium (COLACE) capsule 100 mg, 100 mg, Oral, BID  sennosides-docusate sodium (SENOKOT-S) 8.6-50 MG tablet 1 tablet, 1 tablet, Oral, BID  magnesium hydroxide (MILK OF MAGNESIA) 400 MG/5ML suspension 30 mL, 30 mL, Oral, Daily PRN  ondansetron (ZOFRAN) injection 4 mg, 4 mg, Intravenous, Q6H PRN  apixaban (ELIQUIS) tablet 2.5 mg, 2.5 mg, Oral, BID  ceFAZolin (ANCEF) 2 g in dextrose 4 % 100 mL IVPB (premix), 2 g, Intravenous, Q8H  vitamin B-12 (CYANOCOBALAMIN) tablet 50 mcg, 50 mcg, Oral, Daily  therapeutic multivitamin-minerals 1 tablet, 1 tablet, Oral, Lunch  oxybutynin (DITROPAN-XL) extended release tablet 5 mg, 5 mg, Oral, Nightly  metoprolol tartrate (LOPRESSOR) tablet 37.5 mg, 37.5 mg, Oral, BID  lisinopril (PRINIVIL;ZESTRIL) tablet 2.5 mg, 2.5 mg, Oral, Daily  levothyroxine (SYNTHROID) tablet 88 mcg, 88 mcg, Oral, QAM AC  oxyCODONE (ROXICODONE) immediate release tablet 5 mg, 5 mg, Oral, Q4H PRN **OR** oxyCODONE (ROXICODONE) immediate release tablet 10 mg, 10 mg, Oral, Q4H PRN  morphine (PF) injection 2 mg, 2 mg, Intravenous, Q2H PRN **OR** morphine injection 4 mg, 4 mg, Intravenous, Q2H PRN  0.9 % sodium chloride bolus, 500 mL, Intravenous, PRN  hydrALAZINE (APRESOLINE) injection 10 mg, 10 mg, Intravenous, Q6H PRN  rosuvastatin (CRESTOR) tablet 5 mg, 5 mg, Oral, Nightly  donepezil (ARICEPT) tablet 5 mg, 5 mg, Oral, Nightly     Objective:      BP (!) 156/69   Pulse 67   Temp 97.8 °F (36.6 °C) (Oral)   Resp 16   Ht 5' 5\" (1.651 m)   Wt 94 lb (42.6 kg)   SpO2 96%   BMI 15.64 kg/m²      Patient Vitals for the past 24 hrs:   BP Temp Temp src Pulse Resp SpO2   01/27/20 0600 (!) 156/69 97.8 °F (36.6 °C) Oral 67 16 96 %   01/27/20 0110 (!) 152/67 -- -- -- -- --   01/27/20 0100 (!) 168/75 98 °F (36.7 °C) Oral 64 16 98 %   01/26/20 2115 130/70 98.1 °F (36.7 °C) Oral 73 16 93 %   01/26/20 1830 (!) 141/71 -- -- 71 -- --   01/26/20 1549 138/67 -- -- 72 -- --   01/26/20 1400 (!) 164/76 98 °F (36.7 °C) -- 72 20 93 %   01/26/20 1330 (!) 160/66 -- -- 66 20 99 %   01/26/20 1300 (!) 161/65 97.7 °F (36.5 °C) Oral 74 20 95 %   01/26/20 1236 (!) 165/57 -- -- 65 22 95 %   01/26/20 1230 (!) 173/65 -- -- 64 12 94 %   01/26/20 1215 (!) 197/68 -- -- 67 15 96 %   01/26/20 1200 (!) 202/75 -- -- 62 20 100 %   01/26/20 1150 (!) 182/75 97.6 °F (36.4 °C) Temporal 69 13 100 %   01/26/20 1145 (!) 198/79 -- -- 63 20 97 %   01/26/20 1140 (!) 195/73 -- -- 68 14 99 %   01/26/20 1135 (!) 189/78 97.6 °F (36.4 °C) Temporal 70 14 91 %   01/26/20 1021 (!) 163/50 98.6 °F (37 °C) Temporal 68 16 91 %     Patient Vitals for the past 96 hrs (Last 3 readings):   Weight   01/25/20 1658 94 lb (42.6 kg)         Intake/Output Summary (Last 24 hours) at 1/27/2020 0858  Last data filed at 1/26/2020 2115  Gross per 24 hour   Intake 350 ml   Output 200 ml   Net 150 ml         Physical Exam:  S1, S2 normal, no murmur, rub or gallop, regular rate and rhythm  clear to auscultation bilaterally  abdomen is soft without significant tenderness, masses, organomegaly or guarding  extremities normal, atraumatic, no cyanosis or edema    Labs:  Lab Results   Component Value Date    WBC 15.1 (H) 01/27/2020    HGB 8.7 (L) 01/27/2020    HCT 26.0 (L) 01/27/2020     01/27/2020    CHOL 152 02/03/2017    TRIG 95 02/03/2017    HDL 57 02/03/2017    ALT 9 (L) 01/26/2020    AST 21 01/26/2020     (L) 01/27/2020    K 5.0 01/27/2020    CL 99 01/27/2020    CREATININE 1.1 01/27/2020    BUN 33 (H) 01/27/2020    CO2 23 01/27/2020    TSH 4.62 (H) 10/02/2018    INR 1.13 01/25/2020     Lab Results   Component Value Date    TROPONINI <0.01 01/25/2020       Imaging: All recent imaging studies reviewed in computerized chart. Xr Hip Left (2-3 Views)    Result Date: 1/26/2020  EXAMINATION: SPOT FLUOROSCOPIC IMAGES 1/26/2020 11:18 am TECHNIQUE: Fluoroscopy was provided by the radiology department for procedure. Radiologist was not present during examination. FLUOROSCOPY DOSE AND TYPE OR TIME AND EXPOSURES: 1 minute 22 seconds. 2 images. Dose not provided. COMPARISON: None HISTORY: Intraprocedural imaging. FINDINGS: 2 spot images of the hip were obtained. Images were submitted from internal fixation with placement of cannulated screws. Intraprocedural fluoroscopic spot images as above. See separate procedure report for more information.      Xr Chest Portable    Result Date: loss anemia - labs ordered for tomorrow. Active Problems:    Hypothyroid -Established problem. Stable. Plan: stay on synthroid    Hyperlipidemia LDL goal <130 -Established problem. Stable. Plan: on statin, to continue    Essential hypertension -Established problem. Stable. 156/69  Plan: cont bp meds, to get usual meds this am. Cont iv hydralazine prn    Hip fracture requiring operative repair, left, closed, initial encounter ([facility]   anemia, acute blood loss postoperative - New Problem to me.  hgb down to 8.7  Plan - No indication for transfusion. Cont to monitor h/h to assess progression of anemia. Recommend ferrous sulfate or MVI as outpatient.      Disp - likely to snf tomorrow        Mack Dash  1/27/2020

## 2020-01-27 NOTE — PROGRESS NOTES
surgery. Restrictions  Restrictions/Precautions  Restrictions/Precautions: Weight Bearing, Fall Risk  Required Braces or Orthoses?: No  Lower Extremity Weight Bearing Restrictions  Left Lower Extremity Weight Bearing: Partial Weight Bearing  Partial Weight Bearing Percentage Or Pounds: 25%  Position Activity Restriction  Other position/activity restrictions: Jorge Felix is a 80 y.o. female presents for evaluation of left hip pain status post mechanical fall that occurred this morning around 10 AM.  Patient states that she was walking on her hardwood floors when her shoes slipped and she fell, landing on her left side. She states that she got up on her own without difficulty but has since noticed increasing pain in the left hip and would like for her to be checked out. She denies hitting her head or any loss of consciousness. She is anticoagulated on Eliquis. She denies any neck or back pain. No numbness, tingling or weakness distally. Skin is intact. States fall struck mechanical denies any preceding symptoms such as dizziness/lightheadedness, weakness or visual disturbances. No chest pain or shortness of breath. She has no other complaints or concerns at this time. Vision/Hearing  Vision: Impaired  Vision Exceptions: Wears glasses for reading  Hearing: Exceptions to [facility]  Hearing Exceptions: Hard of hearing/hearing concerns; No hearing aid     Subjective  General  Chart Reviewed: Yes  Additional Pertinent Hx: per daughter, mild dementia  Response To Previous Treatment: Not applicable  Family / Caregiver Present: No  Diagnosis: S/P fall and L hip fracture, hip pinning 1/26  Follows Commands: Within Functional Limits  General Comment  Comments: Pt supine in bed upon arrival, sleeping, but awakens easily.   Subjective  Subjective: Pt agreeable to PT/OT eval.  Pain Screening  Patient Currently in Pain: Denies(at rest)  Vital Signs  Patient Currently in Pain: Denies status of LLE, with limited UE weightbearing during ambulation. Stairs/Curb  Stairs?: No     Balance  Posture: Fair  Sitting - Static: Good  Sitting - Dynamic: Good  Standing - Static: Fair  Standing - Dynamic: [facility]  Times per week: 7x  Times per day: Daily  Current Treatment Recommendations: Strengthening, ROM, Functional Mobility Training, Balance Training, Transfer Training, Endurance Training, Gait Training, Safety Education & Training, Home Exercise Program, Patient/Caregiver Education & Training  Safety Devices  Type of devices: All fall risk precautions in place, Call light within reach, Chair alarm in place, Gait belt, Patient at risk for falls, Nurse notified, Left in chair  Restraints  Initially in place: No    G-Code       OutComes Score                                                  AM-PAC Score  AM-PAC Inpatient Mobility Raw Score : 16 (01/27/20 1043)  AM-PAC Inpatient T-Scale Score : 40.78 (01/27/20 1043)  Mobility Inpatient CMS 0-100% Score: 54.16 (01/27/20 1043)  Mobility Inpatient CMS G-Code Modifier : CK (01/27/20 1043)          Goals  Short term goals  Time Frame for Short term goals:  To be met prior to discharge  Short term goal 1: Pt will complete bed mobility with supervision  Short term goal 2: Pt will complete sit to/from stand with supervision while maintaining PWB  Short term goal 3: Pt will ambulate 50 ft with LRAD and CGA while maintaining PWB       Therapy Time   Individual Concurrent Group Co-treatment   Time In 0828         Time Out 0936         Minutes 68         Timed Code Treatment Minutes: [address], PT   Isabel Martin, [address] [facility], DPT, [id number]

## 2020-01-28 VITALS
SYSTOLIC BLOOD PRESSURE: 173 MMHG | OXYGEN SATURATION: 96 % | HEIGHT: 65 IN | WEIGHT: 94 LBS | DIASTOLIC BLOOD PRESSURE: 71 MMHG | RESPIRATION RATE: 16 BRPM | BODY MASS INDEX: 15.66 KG/M2 | TEMPERATURE: 98 F | HEART RATE: 68 BPM

## 2020-01-28 LAB
ANION GAP SERPL CALCULATED.3IONS-SCNC: 11 MMOL/L (ref 3–16)
BASOPHILS ABSOLUTE: 0.1 K/UL (ref 0–0.2)
BASOPHILS RELATIVE PERCENT: 0.7 %
BUN BLDV-MCNC: 27 MG/DL (ref 7–20)
CALCIUM SERPL-MCNC: 8.2 MG/DL (ref 8.3–10.6)
CHLORIDE BLD-SCNC: 97 MMOL/L (ref 99–110)
CO2: 21 MMOL/L (ref 21–32)
CREAT SERPL-MCNC: 0.9 MG/DL (ref 0.6–1.2)
EKG ATRIAL RATE: 71 BPM
EKG DIAGNOSIS: NORMAL
EKG P AXIS: 20 DEGREES
EKG P-R INTERVAL: 170 MS
EKG Q-T INTERVAL: 430 MS
EKG QRS DURATION: 90 MS
EKG QTC CALCULATION (BAZETT): 467 MS
EKG R AXIS: -60 DEGREES
EKG T AXIS: 32 DEGREES
EKG VENTRICULAR RATE: 71 BPM
EOSINOPHILS ABSOLUTE: 0.4 K/UL (ref 0–0.6)
EOSINOPHILS RELATIVE PERCENT: 3.3 %
GFR AFRICAN AMERICAN: >60
GFR NON-AFRICAN AMERICAN: 59
GLUCOSE BLD-MCNC: 102 MG/DL (ref 70–99)
HCT VFR BLD CALC: 29.7 % (ref 36–48)
HEMOGLOBIN: 9.8 G/DL (ref 12–16)
LYMPHOCYTES ABSOLUTE: 1.6 K/UL (ref 1–5.1)
LYMPHOCYTES RELATIVE PERCENT: 12 %
MCH RBC QN AUTO: 31.3 PG (ref 26–34)
MCHC RBC AUTO-ENTMCNC: 32.9 G/DL (ref 31–36)
MCV RBC AUTO: 95.3 FL (ref 80–100)
MONOCYTES ABSOLUTE: 0.7 K/UL (ref 0–1.3)
MONOCYTES RELATIVE PERCENT: 5.2 %
NEUTROPHILS ABSOLUTE: 10.2 K/UL (ref 1.7–7.7)
NEUTROPHILS RELATIVE PERCENT: 78.8 %
PDW BLD-RTO: 15.6 % (ref 12.4–15.4)
PLATELET # BLD: 210 K/UL (ref 135–450)
PMV BLD AUTO: 7.5 FL (ref 5–10.5)
POTASSIUM SERPL-SCNC: 5.3 MMOL/L (ref 3.5–5.1)
RBC # BLD: 3.12 M/UL (ref 4–5.2)
SODIUM BLD-SCNC: 129 MMOL/L (ref 136–145)
WBC # BLD: 13 K/UL (ref 4–11)

## 2020-01-28 PROCEDURE — 36415 COLL VENOUS BLD VENIPUNCTURE: CPT

## 2020-01-28 PROCEDURE — 2580000003 HC RX 258: Performed by: ORTHOPAEDIC SURGERY

## 2020-01-28 PROCEDURE — 99233 SBSQ HOSP IP/OBS HIGH 50: CPT | Performed by: INTERNAL MEDICINE

## 2020-01-28 PROCEDURE — 85025 COMPLETE CBC W/AUTO DIFF WBC: CPT

## 2020-01-28 PROCEDURE — 6370000000 HC RX 637 (ALT 250 FOR IP): Performed by: ORTHOPAEDIC SURGERY

## 2020-01-28 PROCEDURE — 80048 BASIC METABOLIC PNL TOTAL CA: CPT

## 2020-01-28 PROCEDURE — 93005 ELECTROCARDIOGRAM TRACING: CPT | Performed by: INTERNAL MEDICINE

## 2020-01-28 PROCEDURE — APPNB30 APP NON BILLABLE TIME 0-30 MINS: Performed by: NURSE PRACTITIONER

## 2020-01-28 PROCEDURE — 51798 US URINE CAPACITY MEASURE: CPT

## 2020-01-28 PROCEDURE — 6360000002 HC RX W HCPCS: Performed by: ORTHOPAEDIC SURGERY

## 2020-01-28 PROCEDURE — 93010 ELECTROCARDIOGRAM REPORT: CPT | Performed by: INTERNAL MEDICINE

## 2020-01-28 PROCEDURE — 99024 POSTOP FOLLOW-UP VISIT: CPT | Performed by: NURSE PRACTITIONER

## 2020-01-28 RX ORDER — ASPIRIN 325 MG
325 TABLET, DELAYED RELEASE (ENTERIC COATED) ORAL DAILY
Qty: 30 TABLET | Refills: 0 | Status: ON HOLD
Start: 2020-01-28 | End: 2021-01-01 | Stop reason: HOSPADM

## 2020-01-28 RX ORDER — OXYCODONE HYDROCHLORIDE 5 MG/1
5 TABLET ORAL EVERY 6 HOURS PRN
Qty: 12 TABLET | Refills: 0 | Status: SHIPPED | OUTPATIENT
Start: 2020-01-28 | End: 2020-01-31

## 2020-01-28 RX ORDER — DONEPEZIL HYDROCHLORIDE 5 MG/1
5 TABLET, FILM COATED ORAL NIGHTLY
Qty: 30 TABLET | Refills: 3
Start: 2020-01-28

## 2020-01-28 RX ADMIN — SENNOSIDES AND DOCUSATE SODIUM 1 TABLET: 8.6; 5 TABLET ORAL at 10:00

## 2020-01-28 RX ADMIN — MULTIPLE VITAMINS W/ MINERALS TAB 1 TABLET: TAB at 11:39

## 2020-01-28 RX ADMIN — METOPROLOL TARTRATE 37.5 MG: 25 TABLET, FILM COATED ORAL at 10:00

## 2020-01-28 RX ADMIN — LEVOTHYROXINE SODIUM 88 MCG: 88 TABLET ORAL at 07:02

## 2020-01-28 RX ADMIN — LISINOPRIL 2.5 MG: 5 TABLET ORAL at 10:00

## 2020-01-28 RX ADMIN — SODIUM CHLORIDE: 4.5 INJECTION, SOLUTION INTRAVENOUS at 04:16

## 2020-01-28 RX ADMIN — MORPHINE SULFATE 2 MG: 2 INJECTION, SOLUTION INTRAMUSCULAR; INTRAVENOUS at 02:58

## 2020-01-28 RX ADMIN — DOCUSATE SODIUM 100 MG: 100 CAPSULE ORAL at 10:00

## 2020-01-28 RX ADMIN — Medication 50 MCG: at 10:00

## 2020-01-28 RX ADMIN — OXYCODONE 5 MG: 5 TABLET ORAL at 07:02

## 2020-01-28 RX ADMIN — APIXABAN 2.5 MG: 2.5 TABLET, FILM COATED ORAL at 10:00

## 2020-01-28 ASSESSMENT — PAIN SCALES - GENERAL
PAINLEVEL_OUTOF10: 0
PAINLEVEL_OUTOF10: 4
PAINLEVEL_OUTOF10: 4

## 2020-01-28 NOTE — CARE COORDINATION
Discharge Plan:      Patient discharged TO FACILITY: [facility]  PHONE: [address] [facility] [address]: [phone]  SW/DC [address] [facility] faxed, [address]   Narcotic Prescriptions faxed were: Oxycodone 5 mg. RN: Nolvia Stein ([id number])will call report      Medical Transport with: [address] [facility]  [phone]   time: 13:00  Family advised of discharge?: Yes. JOSE spoke with daughter Tracey Gutierrez. HENS Submitted?:  Yes  All discharge needs met per case management.     JOSE CARLOS Benitez, [address]  [facility]  [phone]

## 2020-01-28 NOTE — PROGRESS NOTES
AM assessment complete. Neuro checks WDL. VSS. Dressing is clean dry and intact. Patient is resting in bed with call light in reach.

## 2020-01-28 NOTE — PROGRESS NOTES
[facility] Orthopedic Surgery   Progress Note    CHIEF COMPLAINT/DIAGNOSIS:  1/26/20 LEFT hip percutaneous pinning    SUBJECTIVE: Patient sitting up in bed; reports hip pain is manageable. OBJECTIVE  Physical    VITALS:  BP (!) 190/69   Pulse 69   Temp 98.1 °F (36.7 °C) (Oral)   Resp 16   Ht 5' 5\" (1.651 m)   Wt 94 lb (42.6 kg)   SpO2 95%   BMI 15.64 kg/m²     GENERAL: Alert, NAD,  MUSCULOSKELETAL: LEFT LE  INCISION:  Dressing c/d/i  ROM: intact DF/PF  Sensory:  Intact to light touch in peroneal and tibial distributions  Vascular:   Intact post tib pulse;  calf soft and nontender    Data    ALL MEDICATIONS HAVE BEEN REVIEWED    CBC:   Recent Labs     01/26/20  0416 01/27/20  0614 01/28/20  0653   WBC 8.5 15.1* 13.0*   HGB 10.2* 8.7* 9.8*   HCT 30.4* 26.0* 29.7*    212 210     BMP:   Recent Labs     01/26/20  0416 01/27/20  0614 01/28/20  0653   * 132* 129*   K 5.2* 5.0 5.3*    99 97*   CO2 21 23 21   BUN 24* 33* 27*   CREATININE 1.0 1.1 0.9     INR:   Recent Labs     01/25/20  1746   INR 1.13       ASSESSMENT:  1/26/20 LEFT hip percutaneous pinning, POD#2  Atrial fibrillation on Eliquis  Chronic back pain  Dementia    PLAN:   - WB status:  25% partial weight bearing  - DVT prophylaxis: Home Eliquis; if patient does not continue with home Eliquis, she will need  mg. Daily x 30 days after discharge  - PT/OT  - D/C Plan: SNF today  - Pain Control: current regimen Due to orthopaedic surgical procedure/condition, patient may require pain medication for up to 6-8 weeks. - F GRETCHEN with Dr. Grazyna Zheng in 2 weeks; call [phone] to schedule appt.      DE HARRINGTON-CNP  1/28/2020  10:11 AM    .

## 2020-01-28 NOTE — DISCHARGE SUMMARY
[facility] -- Physician Discharge Summary     Ava Felix  7/21/1931  MRN: [id number]    Admit Date: 1/25/2020  Discharge Date: No discharge date for patient encounter. Attending MD: Carolina Jimenez MD  Discharging MD: Carolina Jimenez MD  PCP: Clarisa Brumfield MD [address] / [place] [id number] [phone]    Admission Diagnosis: Hip fracture requiring operative repair, left, closed, initial encounter [facility]) Alfredo Cornejo  Hip fracture requiring operative repair, left, closed, initial encounter ([facility] Alfredo Cornejo  DISCHARGE DIAGNOSIS: same    Full Hospital Problem List:  C/Go [place] 1106 Problems    Diagnosis Date Noted    Acute blood loss as cause of postoperative anemia [D62] 01/27/2020    Closed nondisplaced basicervical fracture of left femur with nonunion [S72.045K] 01/25/2020    Hip fracture requiring operative repair, left, closed, initial encounter ([facility] Alfredo Cornejo 01/25/2020    Essential hypertension [I10] 12/18/2018    Hyperlipidemia LDL goal <130 [E78.5]     Hypothyroid [E03.9]            Hospital Course:  80 y. o. female presents for evaluation of left hip pain status post mechanical fall that occurred this morning around 10 AM.  Patient states that she was walking on her hardwood floors when her shoes slipped and she fell, landing on her left side.  She states that she got up on her own without difficulty but has since noticed increasing pain in the left hip and would like for her to be checked out. Reba Serrano denies hitting her head or any loss of consciousness.  She is anticoagulated on Eliquis.  She denies any neck or back pain.  No numbness, tingling or weakness distally.  Skin is intact.  States fall struck mechanical denies any preceding symptoms such as dizziness/lightheadedness, weakness or visual disturbances.  No chest pain or shortness of breath.  She has no other complaints or concerns at this time.  Plain films from ER are reviewed by self, Nondisplaced left subcapital femoral neck fracture.  Diffuse osteopenia is seen. Pt is requiring iv pain meds for pain control    Ortho is consulted  Pt is taken to OR per dr Antionette Singh    Date of Procedure: 1/26/2020     Pre-Op Diagnosis: Left hip femoral neck fracture     Post-Op Diagnosis: Same       Procedure(s):  LEFT HIP PINNING    Pt does well post op  Recovery is uneventful  Pt wants to go ARU, but declined by [facility] recommended        Consults made during Hospitalization:  IP CONSULT TO ORTHOPEDIC SURGERY  IP CONSULT TO INTERNAL MEDICINE  IP CONSULT TO ORTHOPEDIC SURGERY  IP CONSULT TO SOCIAL WORK  IP CONSULT TO PHYSICAL MEDICINE REHAB    Treatment team at time of Discharge: Treatment Team: Attending Provider: Bryce Lobo MD; Consulting Physician: Bryce Lobo MD; Registered Nurse: Cleveland Etienne RN; Consulting Physician: Cory Nolan MD; Surgeon: Cory Nolan MD; Consulting Physician: Eugene Dash MD; Registered Nurse: William Llamas RN; Respiratory Therapist (Day): Arleen Gosselin, RCP; Registered Nurse: Jose Chavarria RN; Utilization Reviewer: Rafat Pool RN    Imaging Results:  Xr Hip Left (2-3 Views)    Result Date: 1/26/2020  EXAMINATION: SPOT FLUOROSCOPIC IMAGES 1/26/2020 11:18 am TECHNIQUE: Fluoroscopy was provided by the radiology department for procedure. Radiologist was not present during examination. FLUOROSCOPY DOSE AND TYPE OR TIME AND EXPOSURES: 1 minute 22 seconds. 2 images. Dose not provided. COMPARISON: None HISTORY: Intraprocedural imaging. FINDINGS: 2 spot images of the hip were obtained. Images were submitted from internal fixation with placement of cannulated screws. Intraprocedural fluoroscopic spot images as above. See separate procedure report for more information. Xr Chest Portable    Result Date: 1/25/2020  EXAMINATION: ONE XRAY VIEW OF THE CHEST 1/25/2020 5:18 pm COMPARISON: 10/03/2018.  HISTORY: ORDERING SYSTEM PROVIDED HISTORY: SOB TECHNOLOGIST PROVIDED HISTORY: Reason for exam:->SOB Reason for Exam: tripped over her shoe, states she has pain in her left hip, denies LOC Acuity: Acute Type of Exam: Initial FINDINGS: The exam is rotated. The cardiomediastinal silhouette is stable. Aortic vascular calcification. Mild prominence of the interstitial markings throughout the lungs, similar to the previous study and likely related to underlying COPD. No superimposed acute infiltrate, pleural fluid or evidence of overt failure. Loop recorder projects over the left chest.  Multilevel vertebral compression fractures are again suggested. No acute cardiopulmonary disease. COPD. Xr Hip 2-3 Vw W Pelvis Left    Result Date: 1/25/2020  EXAMINATION: ONE XRAY VIEW OF THE PELVIS AND TWO XRAY VIEWS LEFT HIP 1/25/2020 5:18 pm COMPARISON: None. HISTORY: ORDERING SYSTEM PROVIDED HISTORY: fall, pain w/ rom TECHNOLOGIST PROVIDED HISTORY: Reason for exam:->fall, pain w/ rom Reason for Exam: tripped over her shoe, states she has pain in her left hip, denies LOC Acuity: Acute Type of Exam: Initial FINDINGS: The bones are diffusely osteopenic. There is a nondisplaced, minimally impacted, subcapital left femoral neck fracture. No other fracture is seen. The SI joints are maintained. Hip joints bilaterally are preserved with minimal osteoarthritic spurring. No focal soft tissue abnormality. Nondisplaced left subcapital femoral neck fracture. Diffuse osteopenia.          Discharge Exam:  BP (!) 170/76   Pulse 70   Temp 97.6 °F (36.4 °C) (Oral)   Resp 16   Ht 5' 5\" (1.651 m)   Wt 94 lb (42.6 kg)   SpO2 95%   BMI 15.64 kg/m²   General appearance: alert, appears stated age and cooperative  Head: Normocephalic, without obvious abnormality, atraumatic  Neck: no adenopathy, no carotid bruit, no JVD, supple, symmetrical, trachea midline and thyroid not enlarged, symmetric, no tenderness/mass/nodules  Heart: regular rate and rhythm, S1, S2 normal, no murmur, click, rub or gallop  Abdomen: soft, non-tender; bowel sounds normal; no masses,  no organomegaly  Extremities: extremities normal, atraumatic, no cyanosis or edema  Pulses: 2+ and symmetric    Disposition: SNF    Condition: stable    Discharge Medications:   Aron Felix Medication Instructions [facility]:[id number]    Printed on:01/28/20 0804   Medication Information                      amitriptyline (ELAVIL) 10 MG tablet  PRN             apixaban (ELIQUIS) 2.5 MG TABS tablet  Take 1 tablet by mouth 2 times daily             Calcium Carbonate-Vitamin D (CALTRATE 600+D PO)  Take 1 tablet by mouth daily              donepezil (ARICEPT) 5 MG tablet  Take 1 tablet by mouth nightly             KRILL OIL PO  Take 1 tablet by mouth daily             levothyroxine (SYNTHROID) 88 MCG tablet  Take 88 mcg by mouth Daily             lisinopril (PRINIVIL;ZESTRIL) 5 MG tablet  Take 0.5 tablets by mouth daily             metoprolol tartrate 37.5 MG TABS  Take 37.5 mg by mouth 2 times daily             multivitamin-iron-minerals-folic acid (CENTRUM) chewable tablet  Take 1 tablet by mouth daily             oxybutynin (DITROPAN) 5 MG tablet  TAKE ONE TABLET BY MOUTH TWICE A DAY             oxyCODONE (ROXICODONE) 5 MG immediate release tablet  Take 1 tablet by mouth every 6 hours as needed for Pain for up to 3 days. simvastatin (ZOCOR) 20 MG tablet  TAKE ONE- HALF (1/2) TABLET BY MOUTH ONCE NIGHTLY             vitamin B-12 (CYANOCOBALAMIN) 100 MCG tablet  Take 50 mcg by mouth daily             zoledronic acid (RECLAST) 5 MG/100ML SOLN  Infuse 100 mLs intravenously once for 1 dose                 Allergies: Allergies   Allergen Reactions    Prozac [Fluoxetine Hcl] Nausea Only       Follow up Instructions: Follow-up with PCP: Bubba Mcmanus MD in 2 wk .       Total time spent on day of discharge including face-to-face visit, examination, documentation, counseling, preparation of discharge plans and followup, and discharge medicine reconciliation and presciptions is 35 minutes    Signed:  Bret Cornejo MD  1/28/2020

## 2020-01-28 NOTE — PROGRESS NOTES
Patient complained of Chest Pain and Tightness of the Chest. STAT EKG was ordered and performed. No acute changes from the EKG performed on 01/20/2020. VSS. Patient has not consumed much food for the last few days. The patient had just consumed the Eucharist. oranges, and drank some Ensure. Patient stated the pain feels like indigestion. Called and talked to Dr. Duc Winchester. He stated to proceed with transport to [facility]. Patient transported to [facility] via medical transport.

## 2020-01-28 NOTE — PROGRESS NOTES
Oropharynx is clear and moist.   · Eyes: Conjunctivae normal. EOM are normal.   · Neck: Neck supple. No rigidity. No JVD present. · Cardiovascular: Normal rate, regular rhythm, S1&S2. · Pulmonary/Chest: Bilateral respiratory sounds. No wheezes, No rhonchi. · Abdominal: Soft. Bowel sounds present. No distension, No tenderness. · Musculoskeletal: No tenderness. No edema    · Lymphadenopathy: Has no cervical adenopathy. · Neurological: Alert and oriented. Cranial nerve appears intact, No Gross deficit   · Skin: Skin is warm and dry. No rash noted. · Psychiatric: Has a normal behavior     Labs, diagnostic and imaging results reviewed. Reviewed. Recent Labs     01/26/20 0416 01/27/20  0614 01/28/20  0653   * 132* 129*   K 5.2* 5.0 5.3*    99 97*   CO2 21 23 21   BUN 24* 33* 27*   CREATININE 1.0 1.1 0.9     Recent Labs     01/26/20 0416 01/27/20  0614 01/28/20  0653   WBC 8.5 15.1* 13.0*   HGB 10.2* 8.7* 9.8*   HCT 30.4* 26.0* 29.7*   MCV 94.9 93.9 95.3    212 210     Lab Results   Component Value Date    TROPONINI <0.01 01/25/2020     CrCl cannot be calculated (Unknown ideal weight.).    No results found for: BNP  Lab Results   Component Value Date    PROTIME 13.1 01/25/2020    PROTIME 15.6 10/03/2018    PROTIME 13.0 10/02/2018    INR 1.13 01/25/2020    INR 1.9 10/17/2018    INR 1.6 10/12/2018    INR 1 10/08/2018    INR 1.37 10/03/2018    INR 1.14 10/02/2018     Lab Results   Component Value Date    CHOL 152 02/03/2017    HDL 57 02/03/2017    TRIG 95 02/03/2017       Scheduled Meds:   sodium chloride flush  10 mL Intravenous 2 times per day    docusate sodium  100 mg Oral BID    sennosides-docusate sodium  1 tablet Oral BID    apixaban  2.5 mg Oral BID    vitamin B-12  50 mcg Oral Daily    therapeutic multivitamin-minerals  1 tablet Oral Lunch    oxybutynin  5 mg Oral Nightly    metoprolol tartrate  37.5 mg Oral BID    lisinopril  2.5 mg Oral Daily    levothyroxine  88 mcg

## 2020-01-29 ENCOUNTER — TELEPHONE (OUTPATIENT)
Dept: CARDIOLOGY CLINIC | Age: 85
End: 2020-01-29

## 2020-01-29 NOTE — TELEPHONE ENCOUNTER
If she wants to hold eliquis she needs the monitoring or she can continue the eliquis and decrease the monitoring.  Called Ifrah Call but she was not home left a message for her to call back

## 2020-01-30 NOTE — PROGRESS NOTES
Physical Therapy  Physical Therapy Discharge Summary    Name: Mark Felix  : 1931    The pt was evaluated by PT on 20 and seen for 0 treatment sessions prior to DC to [facility] on 20 per MD order. The pt's acute therapy goals were:  Short term goals  Time Frame for Short term goals: To be met prior to discharge  Short term goal 1: Pt will complete bed mobility with supervision  Short term goal 2: Pt will complete sit to/from stand with supervision while maintaining PWB  Short term goal 3: Pt will ambulate 50 ft with LRAD and CGA while maintaining PWB       Patient met 0 goals during stay. Number of Refusals:0  Number of Holds: 1  During this hospitalization, the patient was educated on:  Patient Education: D/C recommendations    DC pt from PT caseload at this time. Thank you!     Thanks, Savannah Avalos, DPT [phone]

## 2020-02-05 NOTE — TELEPHONE ENCOUNTER
Daughter calling back. Stated the pt is at [facility], for the next two weeks, and does not have the monitoring equipment there. She will then go to [facility] living. If she needs to take the Eliquis 2.5 mg, it will need refilled.

## 2020-02-07 NOTE — TELEPHONE ENCOUNTER
I called Johnathon Baltazar and discussed Dr. Elder Beauchamp recommended options outlined in the message below. She and her mother would prefer to stop the Eliquis. She will make sure mom's monitoring equipment gets to [facility] ([phone]) when she moves over there which should be sometime soon. I made follow up tono't with Dr. Bernard Ashford for 3/26/2020. I called the nurse at [facility] ([phone]) and gave a verbal order to discontinue Eliquis. However, they need a written order. I wrote one out which Dr. Bernard Ashford signed. I faxed to [facility] ([phone]) with confirmation received.

## 2020-02-11 ENCOUNTER — OFFICE VISIT (OUTPATIENT)
Dept: ORTHOPEDIC SURGERY | Age: 85
End: 2020-02-11

## 2020-02-11 VITALS — WEIGHT: 94 LBS | HEIGHT: 65 IN | BODY MASS INDEX: 15.66 KG/M2

## 2020-02-11 PROCEDURE — 99024 POSTOP FOLLOW-UP VISIT: CPT | Performed by: NURSE PRACTITIONER

## 2020-02-11 PROCEDURE — APPNB15 APP NON BILLABLE TIME 0-15 MINS: Performed by: NURSE PRACTITIONER

## 2020-02-11 NOTE — PROGRESS NOTES
DIAGNOSIS:  Left femur impacted neck fracture, status post Hip pinning with cannulated screws. DATE OF SURGERY: 1/26/2020. HISTORY OF PRESENT ILLNESS: Ms. Mike Chau 80 y.o.  female  who came in today for 2 weeks postoperative visit. The patient denies any significant pain in the left  hip. Rates pain a 0/10 VAS and is doing very well. She has been working on ROM and PWB using a walker, but states that she has been putting her full weight on her left leg most of the time as she has no pain. She is in a rehab center working with physical therapy. No numbness or tingling sensation. No fever or Chills. PHYSICAL EXAMINATION:  The incision is healed well, left hip. No signs of any erythema or drainage. She has no pain with the active or passive range of motion of the left  hip. She has intact sensation, distally, and  is neurovascularly intact. IMAGING:  Two views left hip and femur, and AP pelvis taken today in the office showed good alignment of the impacted femoral neck fracture, 3 cannulated screws in good position, no loosening, or hardware failure. IMPRESSION:  2 weeks out from left Hip pinning with cannulated screws, and doing very well. PLAN:  I have told the patient to work on ROM with  PT, protective WB using a walker, as well as strengthening exercises. The patient will come back for a follow up in 6 weeks. At that time, we will take Two views left  Hip, and AP pelvis. As this patient has demonstrated risk factors for osteoporosis, such as age greater than [de-identified] years and evidence of a fracture, I have referred the patient back to the primary care physician for evaluation for osteoporosis, including consideration for DEXA scanning, if this is felt to be clinically indicated. The patient is advised to contact the primary care physician to follow-up for further evaluation.        Todd Loyola, DE - CNP

## 2020-03-02 ENCOUNTER — TELEPHONE (OUTPATIENT)
Dept: ORTHOPEDIC SURGERY | Age: 85
End: 2020-03-02

## 2020-03-02 NOTE — TELEPHONE ENCOUNTER
MISSY FROM [facility] NEEDED OFFICE NOTES FROM 1/26 AND 2/11 FAXED OVER FOR THE PATIENTS INSURANCE.  FAX #: [phone]

## 2020-03-08 PROCEDURE — G2066 INTER DEVC REMOTE 30D: HCPCS | Performed by: INTERNAL MEDICINE

## 2020-03-08 PROCEDURE — 93298 REM INTERROG DEV EVAL SCRMS: CPT | Performed by: INTERNAL MEDICINE

## 2020-03-08 NOTE — PROGRESS NOTES
[employer] remote Linq report shows no arrhythmias. All recordings on report are from over sensing. We will continue to monitor remotely.

## 2020-03-09 ENCOUNTER — NURSE ONLY (OUTPATIENT)
Dept: CARDIOLOGY CLINIC | Age: 85
End: 2020-03-09
Payer: MEDICARE

## 2020-03-23 ENCOUNTER — TELEPHONE (OUTPATIENT)
Dept: ORTHOPEDIC SURGERY | Age: 85
End: 2020-03-23

## 2020-05-05 ENCOUNTER — TELEPHONE (OUTPATIENT)
Dept: CARDIOLOGY CLINIC | Age: 85
End: 2020-05-05

## 2020-05-20 ENCOUNTER — OFFICE VISIT (OUTPATIENT)
Dept: CARDIOLOGY CLINIC | Age: 85
End: 2020-05-20
Payer: MEDICARE

## 2020-05-20 ENCOUNTER — NURSE ONLY (OUTPATIENT)
Dept: CARDIOLOGY CLINIC | Age: 85
End: 2020-05-20
Payer: MEDICARE

## 2020-05-20 VITALS
WEIGHT: 98 LBS | OXYGEN SATURATION: 96 % | DIASTOLIC BLOOD PRESSURE: 80 MMHG | SYSTOLIC BLOOD PRESSURE: 110 MMHG | BODY MASS INDEX: 16.31 KG/M2 | HEART RATE: 59 BPM

## 2020-05-20 PROCEDURE — G8419 CALC BMI OUT NRM PARAM NOF/U: HCPCS | Performed by: INTERNAL MEDICINE

## 2020-05-20 PROCEDURE — 4040F PNEUMOC VAC/ADMIN/RCVD: CPT | Performed by: INTERNAL MEDICINE

## 2020-05-20 PROCEDURE — 99214 OFFICE O/P EST MOD 30 MIN: CPT | Performed by: INTERNAL MEDICINE

## 2020-05-20 PROCEDURE — G8427 DOCREV CUR MEDS BY ELIG CLIN: HCPCS | Performed by: INTERNAL MEDICINE

## 2020-05-20 PROCEDURE — 1036F TOBACCO NON-USER: CPT | Performed by: INTERNAL MEDICINE

## 2020-05-20 PROCEDURE — 93291 INTERROG DEV EVAL SCRMS IP: CPT | Performed by: INTERNAL MEDICINE

## 2020-05-20 PROCEDURE — 1123F ACP DISCUSS/DSCN MKR DOCD: CPT | Performed by: INTERNAL MEDICINE

## 2020-05-20 PROCEDURE — 93000 ELECTROCARDIOGRAM COMPLETE: CPT | Performed by: INTERNAL MEDICINE

## 2020-05-20 PROCEDURE — 1090F PRES/ABSN URINE INCON ASSESS: CPT | Performed by: INTERNAL MEDICINE

## 2020-05-20 RX ORDER — FUROSEMIDE 20 MG/1
20 TABLET ORAL DAILY
Status: ON HOLD | COMMUNITY
Start: 2020-04-20 | End: 2021-01-01 | Stop reason: HOSPADM

## 2020-05-27 ENCOUNTER — TELEPHONE (OUTPATIENT)
Dept: CARDIOLOGY CLINIC | Age: 85
End: 2020-05-27

## 2020-05-27 NOTE — TELEPHONE ENCOUNTER
Discussed with daughter and she decided to stop AC at her recent appt. Risk of thromboembolism was discussed at that time with MXA. If she is tolerating ASA, she can continue, however ASA is not a replacement for [facility]. Daughter JOJO. Will discuss with orthopedics at her follow up also.      Evelia Alonzo, APRN-CNP

## 2020-05-29 ENCOUNTER — OFFICE VISIT (OUTPATIENT)
Dept: ORTHOPEDIC SURGERY | Age: 85
End: 2020-05-29
Payer: MEDICARE

## 2020-05-29 VITALS — HEIGHT: 65 IN | WEIGHT: 98 LBS | TEMPERATURE: 98.1 F | BODY MASS INDEX: 16.33 KG/M2

## 2020-05-29 PROCEDURE — 99213 OFFICE O/P EST LOW 20 MIN: CPT | Performed by: ORTHOPAEDIC SURGERY

## 2020-05-29 PROCEDURE — G8427 DOCREV CUR MEDS BY ELIG CLIN: HCPCS | Performed by: ORTHOPAEDIC SURGERY

## 2020-05-29 PROCEDURE — E0100 CANE ADJUST/FIXED WITH TIP: HCPCS | Performed by: ORTHOPAEDIC SURGERY

## 2020-05-29 PROCEDURE — 1090F PRES/ABSN URINE INCON ASSESS: CPT | Performed by: ORTHOPAEDIC SURGERY

## 2020-05-29 PROCEDURE — 1036F TOBACCO NON-USER: CPT | Performed by: ORTHOPAEDIC SURGERY

## 2020-05-29 PROCEDURE — G8419 CALC BMI OUT NRM PARAM NOF/U: HCPCS | Performed by: ORTHOPAEDIC SURGERY

## 2020-05-29 PROCEDURE — 4040F PNEUMOC VAC/ADMIN/RCVD: CPT | Performed by: ORTHOPAEDIC SURGERY

## 2020-05-29 PROCEDURE — 1123F ACP DISCUSS/DSCN MKR DOCD: CPT | Performed by: ORTHOPAEDIC SURGERY

## 2020-05-29 NOTE — PROGRESS NOTES
DIAGNOSIS:  Left femur impacted neck fracture, status post Hip pinning with cannulated screws. DATE OF SURGERY: 1/26/2020. HISTORY OF PRESENT ILLNESS: Karine Felix 80 y.o.  female  who came in today for 3 months postoperative visit. The patient denies any significant pain in the left hip. Rates pain a 0/10 VAS and is doing very well. She has been working on ROM and WB using a rolator walker while at home, but states she wants to change to a cane if possible. She did not bring her rolator today. She has completed physical therapy. No numbness or tingling sensation. No fever or Chills. Past Medical History:   Diagnosis Date    Carpal tunnel syndrome of left wrist     Chronic low back pain     Depression     [place] (hard of hearing)     Hyperlipidemia LDL goal <130     Hypothyroid     Insomnia     Osteoporosis     Urinary incontinence        Past Surgical History:   Procedure Laterality Date    BACK SURGERY      HIP PINNING Left 1/26/2020    LEFT HIP PINNING performed by Mary Harris MD at [address] [place] [address] History     Socioeconomic History    Marital status:      Spouse name: Not on file    Number of children: 3    Years of education: Not on file    Highest education level: Not on file   Occupational History    Occupation: retired    Social Needs    Financial resource strain: Not on file    Food insecurity     Worry: Not on file     Inability: Not on file   [employer] needs     Medical: Not on file     Non-medical: Not on file   Tobacco Use    Smoking status: Former Smoker     Packs/day: 0.25     Years: 10.00     Pack years: 2.50     Types: Cigarettes    Smokeless tobacco: Never Used   Substance and Sexual Activity    Alcohol use:  Yes     Alcohol/week: 1.0 standard drinks     Types: 1 Glasses of wine per week     Comment: occasional glass of wine or mixed drink    Drug use: No    Sexual activity: Not Currently   Lifestyle    Physical activity Days per week: Not on file     Minutes per session: Not on file    Stress: Not on file   Relationships    Social connections     Talks on phone: Not on file     Gets together: Not on file     Attends Sikhism service: Not on file     Active member of club or organization: Not on file     Attends meetings of clubs or organizations: Not on file     Relationship status: Not on file    Intimate partner violence     Fear of current or ex partner: Not on file     Emotionally abused: Not on file     Physically abused: Not on file     Forced sexual activity: Not on file   Other Topics Concern    Not on file   Social History Narrative    Not on file       Family History   Problem Relation Age of Onset    Heart Disease Father        Current Outpatient Medications on File Prior to Visit   Medication Sig Dispense Refill    furosemide (LASIX) 20 MG tablet Take 20 mg by mouth daily       donepezil (ARICEPT) 5 MG tablet Take 1 tablet by mouth nightly 30 tablet 3    aspirin 325 MG EC tablet Take 1 tablet by mouth daily 30 tablet 0    amitriptyline (ELAVIL) 10 MG tablet PRN (Patient not taking: Reported on 1/25/2020) 90 tablet 2    levothyroxine (SYNTHROID) 88 MCG tablet Take 88 mcg by mouth Daily      zoledronic acid (RECLAST) 5 MG/100ML SOLN Infuse 100 mLs intravenously once for 1 dose 100 mL 0    KRILL OIL PO Take 1 tablet by mouth daily      lisinopril (PRINIVIL;ZESTRIL) 5 MG tablet Take 0.5 tablets by mouth daily 90 tablet 1    metoprolol tartrate 37.5 MG TABS Take 37.5 mg by mouth 2 times daily 60 tablet 2    simvastatin (ZOCOR) 20 MG tablet TAKE ONE- HALF (1/2) TABLET BY MOUTH ONCE NIGHTLY (Patient taking differently: 10 mg ) 45 tablet 2    oxybutynin (DITROPAN) 5 MG tablet TAKE ONE TABLET BY MOUTH TWICE A DAY (Patient taking differently: TAKE ONE TABLET BY MOUTH DAILY) 180 tablet 1    vitamin B-12 (CYANOCOBALAMIN) 100 MCG tablet Take 50 mcg by mouth daily      Calcium Carbonate-Vitamin D (CALTRATE physician for evaluation for osteoporosis, including consideration for DEXA scanning, if this is felt to be clinically indicated. The patient is advised to contact the primary care physician to follow-up for further evaluation.        Kayla Arredondo MD

## 2020-06-22 ENCOUNTER — NURSE ONLY (OUTPATIENT)
Dept: CARDIOLOGY CLINIC | Age: 85
End: 2020-06-22
Payer: MEDICARE

## 2020-06-22 PROCEDURE — 93298 REM INTERROG DEV EVAL SCRMS: CPT | Performed by: INTERNAL MEDICINE

## 2020-06-22 PROCEDURE — G2066 INTER DEVC REMOTE 30D: HCPCS | Performed by: INTERNAL MEDICINE

## 2020-09-08 ENCOUNTER — NURSE ONLY (OUTPATIENT)
Dept: CARDIOLOGY CLINIC | Age: 85
End: 2020-09-08
Payer: MEDICARE

## 2020-09-08 PROCEDURE — 93298 REM INTERROG DEV EVAL SCRMS: CPT | Performed by: INTERNAL MEDICINE

## 2020-09-08 PROCEDURE — G2066 INTER DEVC REMOTE 30D: HCPCS | Performed by: INTERNAL MEDICINE

## 2020-09-08 NOTE — PROGRESS NOTES
We received a remote transmission form patient's monitor at home. Remote Linq report shows no arrhythmias. Noted sensing issues. EP physician to review. We will continue to monitor remotely.

## 2020-09-08 NOTE — LETTER
[address] [facility] [phone]  [place] [facility]    Pacemaker/Defibrillator Clinic          09/08/20        Bubba Felix  [address] [place] [address] [address]  [facility] [address]        Dear Bubba Felix    This letter is to inform you that we received the transmission from your monitor at home that checks your implanted heart device. The next date your monitor will automatically transmit will be 10-12-20. If your report needs attention we will notify you. Your device and monitor are wireless and most transmit cellularly, but please periodically check your monitor is still plugged in to the electrical outlet. If you still use the telephone land line to send please ensure the connection to the phone david is secure. This will help to ensure successful automatic transmissions in the future. Also, the monitor needs to be close to you while sleeping at night. Please be aware that the remote device transmission sites are periodically monitored only during regular business hours during which simultaneous in-office device clinics are being run. If your transmission requires attention, we will contact you as soon as possible. Thank you.             [place] [address]

## 2020-09-16 ENCOUNTER — OFFICE VISIT (OUTPATIENT)
Dept: ORTHOPEDIC SURGERY | Age: 85
End: 2020-09-16
Payer: MEDICARE

## 2020-09-16 VITALS — BODY MASS INDEX: 16.33 KG/M2 | WEIGHT: 98 LBS | TEMPERATURE: 97.7 F | HEIGHT: 65 IN

## 2020-09-16 PROCEDURE — G8419 CALC BMI OUT NRM PARAM NOF/U: HCPCS | Performed by: ORTHOPAEDIC SURGERY

## 2020-09-16 PROCEDURE — 1090F PRES/ABSN URINE INCON ASSESS: CPT | Performed by: ORTHOPAEDIC SURGERY

## 2020-09-16 PROCEDURE — 4040F PNEUMOC VAC/ADMIN/RCVD: CPT | Performed by: ORTHOPAEDIC SURGERY

## 2020-09-16 PROCEDURE — 1123F ACP DISCUSS/DSCN MKR DOCD: CPT | Performed by: ORTHOPAEDIC SURGERY

## 2020-09-16 PROCEDURE — 99213 OFFICE O/P EST LOW 20 MIN: CPT | Performed by: ORTHOPAEDIC SURGERY

## 2020-09-16 PROCEDURE — 1036F TOBACCO NON-USER: CPT | Performed by: ORTHOPAEDIC SURGERY

## 2020-09-16 PROCEDURE — G8427 DOCREV CUR MEDS BY ELIG CLIN: HCPCS | Performed by: ORTHOPAEDIC SURGERY

## 2020-09-16 NOTE — PROGRESS NOTES
week: Not on file     Minutes per session: Not on file    Stress: Not on file   Relationships    Social connections     Talks on phone: Not on file     Gets together: Not on file     Attends Baptism service: Not on file     Active member of club or organization: Not on file     Attends meetings of clubs or organizations: Not on file     Relationship status: Not on file    Intimate partner violence     Fear of current or ex partner: Not on file     Emotionally abused: Not on file     Physically abused: Not on file     Forced sexual activity: Not on file   Other Topics Concern    Not on file   Social History Narrative    Not on file       Family History   Problem Relation Age of Onset    Heart Disease Father        Current Outpatient Medications on File Prior to Visit   Medication Sig Dispense Refill    furosemide (LASIX) 20 MG tablet Take 20 mg by mouth daily       donepezil (ARICEPT) 5 MG tablet Take 1 tablet by mouth nightly 30 tablet 3    levothyroxine (SYNTHROID) 88 MCG tablet Take 88 mcg by mouth Daily      KRILL OIL PO Take 1 tablet by mouth daily      lisinopril (PRINIVIL;ZESTRIL) 5 MG tablet Take 0.5 tablets by mouth daily 90 tablet 1    metoprolol tartrate 37.5 MG TABS Take 37.5 mg by mouth 2 times daily 60 tablet 2    simvastatin (ZOCOR) 20 MG tablet TAKE ONE- HALF (1/2) TABLET BY MOUTH ONCE NIGHTLY (Patient taking differently: 10 mg ) 45 tablet 2    oxybutynin (DITROPAN) 5 MG tablet TAKE ONE TABLET BY MOUTH TWICE A DAY (Patient taking differently: TAKE ONE TABLET BY MOUTH DAILY) 180 tablet 1    vitamin B-12 (CYANOCOBALAMIN) 100 MCG tablet Take 50 mcg by mouth daily      Calcium Carbonate-Vitamin D (CALTRATE 600+D PO) Take 1 tablet by mouth daily       multivitamin-iron-minerals-folic acid (CENTRUM) chewable tablet Take 1 tablet by mouth daily      aspirin 325 MG EC tablet Take 1 tablet by mouth daily 30 tablet 0    amitriptyline (ELAVIL) 10 MG tablet PRN (Patient not taking: Reported evaluation for osteoporosis, including consideration for DEXA scanning, if this is felt to be clinically indicated. The patient is advised to contact the primary care physician to follow-up for further evaluation.        Krista Levine MD

## 2020-10-12 ENCOUNTER — NURSE ONLY (OUTPATIENT)
Dept: CARDIOLOGY CLINIC | Age: 85
End: 2020-10-12
Payer: MEDICARE

## 2020-10-12 PROCEDURE — G2066 INTER DEVC REMOTE 30D: HCPCS | Performed by: INTERNAL MEDICINE

## 2020-10-12 PROCEDURE — 93298 REM INTERROG DEV EVAL SCRMS: CPT | Performed by: INTERNAL MEDICINE

## 2020-10-12 NOTE — PROGRESS NOTES
We received a remote transmission form patient's monitor at home. Remote Linq report shows no arrhythmias. Noted oer sensing. AF not real. EP physician to review. We will continue to monitor remotely. Dr. [facility]ana[facility]emal[facility] N/A

## 2020-11-16 ENCOUNTER — NURSE ONLY (OUTPATIENT)
Dept: CARDIOLOGY CLINIC | Age: 85
End: 2020-11-16
Payer: MEDICARE

## 2020-11-16 PROCEDURE — 93298 REM INTERROG DEV EVAL SCRMS: CPT | Performed by: INTERNAL MEDICINE

## 2020-11-16 PROCEDURE — G2066 INTER DEVC REMOTE 30D: HCPCS | Performed by: INTERNAL MEDICINE

## 2020-11-16 NOTE — PROGRESS NOTES
We received a remote transmission form patient's monitor at home. Remote Linq report shows no arrhythmias. Pause is not real. Noted under sensing. EP physician to review. We will continue to monitor remotely.

## 2020-11-16 NOTE — LETTER
[address] [facility] [phone]  [place] [facility]    Pacemaker/Defibrillator Clinic          11/16/20        Arsen Felix  [address] [place] [address] [address]  [address] [address]        Dear Arsen Felix    This letter is to inform you that we received the transmission from your monitor at home that checks your implanted heart device. The next date your monitor will automatically transmit will be 12-21-20. If your report needs attention we will notify you. Your device and monitor are wireless and most transmit cellularly, but please periodically check your monitor is still plugged in to the electrical outlet. If you still use the telephone land line to send please ensure the connection to the phone david is secure. This will help to ensure successful automatic transmissions in the future. Also, the monitor needs to be close to you while sleeping at night. Please be aware that the remote device transmission sites are periodically monitored only during regular business hours during which simultaneous in-office device clinics are being run. If your transmission requires attention, we will contact you as soon as possible. Thank you.             Fabian

## 2020-12-21 NOTE — LETTER
[address] [facility] [phone]  [place] [facility]    Pacemaker/Defibrillator Clinic          12/21/20        Alize Mu[address]iz [address] [facility] [address]  [facility] [address]        Dear [place] Patient Daeger    This letter is to inform you that we received the transmission from your monitor at home that checks your implanted heart device. The next date your monitor will automatically transmit will be 1-25-21. If your report needs attention we will notify you. Your device and monitor are wireless and most transmit cellularly, but please periodically check your monitor is still plugged in to the electrical outlet. If you still use the telephone land line to send please ensure the connection to the phone david is secure. This will help to ensure successful automatic transmissions in the future. Also, the monitor needs to be close to you while sleeping at night. Please be aware that the remote device transmission sites are periodically monitored only during regular business hours during which simultaneous in-office device clinics are being run. If your transmission requires attention, we will contact you as soon as possible. Thank you.             [place] 81

## 2021-01-01 ENCOUNTER — HOSPITAL ENCOUNTER (INPATIENT)
Age: 86
LOS: 3 days | Discharge: NURSING, REHAB OR LONG-TERM CARE | DRG: 682 | End: 2021-11-15
Attending: INTERNAL MEDICINE | Admitting: INTERNAL MEDICINE
Payer: MEDICARE

## 2021-01-01 ENCOUNTER — APPOINTMENT (OUTPATIENT)
Dept: GENERAL RADIOLOGY | Age: 86
DRG: 871 | End: 2021-01-01
Payer: MEDICARE

## 2021-01-01 ENCOUNTER — APPOINTMENT (OUTPATIENT)
Dept: CT IMAGING | Age: 86
End: 2021-01-01
Payer: MEDICARE

## 2021-01-01 ENCOUNTER — NURSE ONLY (OUTPATIENT)
Dept: CARDIOLOGY CLINIC | Age: 86
End: 2021-01-01
Payer: MEDICARE

## 2021-01-01 ENCOUNTER — HOSPITAL ENCOUNTER (EMERGENCY)
Age: 86
Discharge: NURSING, REHAB OR LONG-TERM CARE | End: 2021-11-16
Payer: MEDICARE

## 2021-01-01 ENCOUNTER — OFFICE VISIT (OUTPATIENT)
Dept: ORTHOPEDIC SURGERY | Age: 86
End: 2021-01-01
Payer: MEDICARE

## 2021-01-01 ENCOUNTER — APPOINTMENT (OUTPATIENT)
Dept: GENERAL RADIOLOGY | Age: 86
DRG: 682 | End: 2021-01-01
Payer: MEDICARE

## 2021-01-01 ENCOUNTER — APPOINTMENT (OUTPATIENT)
Dept: CT IMAGING | Age: 86
DRG: 871 | End: 2021-01-01
Payer: MEDICARE

## 2021-01-01 ENCOUNTER — HOSPITAL ENCOUNTER (INPATIENT)
Age: 86
LOS: 7 days | Discharge: NURSING, REHAB OR LONG-TERM CARE | DRG: 871 | End: 2021-09-21
Attending: INTERNAL MEDICINE | Admitting: INTERNAL MEDICINE
Payer: MEDICARE

## 2021-01-01 VITALS
BODY MASS INDEX: 16.09 KG/M2 | WEIGHT: 100.09 LBS | SYSTOLIC BLOOD PRESSURE: 118 MMHG | HEIGHT: 66 IN | HEART RATE: 90 BPM | TEMPERATURE: 97.7 F | DIASTOLIC BLOOD PRESSURE: 72 MMHG | OXYGEN SATURATION: 96 % | RESPIRATION RATE: 18 BRPM

## 2021-01-01 VITALS
BODY MASS INDEX: 14.46 KG/M2 | SYSTOLIC BLOOD PRESSURE: 141 MMHG | HEART RATE: 92 BPM | WEIGHT: 90 LBS | DIASTOLIC BLOOD PRESSURE: 87 MMHG | RESPIRATION RATE: 30 BRPM | TEMPERATURE: 97.1 F | OXYGEN SATURATION: 99 % | HEIGHT: 66 IN

## 2021-01-01 VITALS — BODY MASS INDEX: 16.33 KG/M2 | TEMPERATURE: 97.2 F | HEIGHT: 65 IN | WEIGHT: 98 LBS

## 2021-01-01 VITALS
WEIGHT: 89.73 LBS | RESPIRATION RATE: 16 BRPM | SYSTOLIC BLOOD PRESSURE: 118 MMHG | OXYGEN SATURATION: 95 % | HEIGHT: 66 IN | HEART RATE: 72 BPM | DIASTOLIC BLOOD PRESSURE: 72 MMHG | BODY MASS INDEX: 14.42 KG/M2 | TEMPERATURE: 97.6 F

## 2021-01-01 DIAGNOSIS — I48.0 PAF (PAROXYSMAL ATRIAL FIBRILLATION) (HCC): ICD-10-CM

## 2021-01-01 DIAGNOSIS — N17.9 AKI (ACUTE KIDNEY INJURY) (HCC): Primary | ICD-10-CM

## 2021-01-01 DIAGNOSIS — Z45.09 ENCOUNTER FOR ELECTRONIC ANALYSIS OF REVEAL EVENT RECORDER: ICD-10-CM

## 2021-01-01 DIAGNOSIS — S72.002A HIP FRACTURE REQUIRING OPERATIVE REPAIR, LEFT, CLOSED, INITIAL ENCOUNTER (HCC): Primary | ICD-10-CM

## 2021-01-01 DIAGNOSIS — R19.7 DIARRHEA, UNSPECIFIED TYPE: ICD-10-CM

## 2021-01-01 DIAGNOSIS — R63.4 WEIGHT LOSS: ICD-10-CM
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
## 2021-01-01 DIAGNOSIS — R07.9 CHEST PAIN, UNSPECIFIED TYPE: ICD-10-CM

## 2021-01-01 DIAGNOSIS — J18.9 PNEUMONIA OF LEFT LOWER LOBE DUE TO INFECTIOUS ORGANISM: Primary | ICD-10-CM

## 2021-01-01 DIAGNOSIS — E87.6 HYPOKALEMIA: ICD-10-CM

## 2021-01-01 DIAGNOSIS — N17.9 AKI (ACUTE KIDNEY INJURY) (HCC): ICD-10-CM

## 2021-01-01 DIAGNOSIS — R53.1 GENERALIZED WEAKNESS: ICD-10-CM

## 2021-01-01 LAB
A/G RATIO: 0.9 (ref 1.1–2.2)
A/G RATIO: 1 (ref 1.1–2.2)
A/G RATIO: 1.2 (ref 1.1–2.2)
A/G RATIO: 1.2 (ref 1.1–2.2)
ABO/RH: NORMAL
ALBUMIN SERPL-MCNC: 3.2 G/DL (ref 3.4–5)
ALBUMIN SERPL-MCNC: 3.4 G/DL (ref 3.4–5)
ALBUMIN SERPL-MCNC: 3.5 G/DL (ref 3.4–5)
ALBUMIN SERPL-MCNC: 4.2 G/DL (ref 3.4–5)
ALP BLD-CCNC: 65 U/L (ref 40–129)
ALP BLD-CCNC: 78 U/L (ref 40–129)
ALP BLD-CCNC: 82 U/L (ref 40–129)
ALP BLD-CCNC: 96 U/L (ref 40–129)
ALT SERPL-CCNC: 11 U/L (ref 10–40)
ALT SERPL-CCNC: 6 U/L (ref 10–40)
ALT SERPL-CCNC: 8 U/L (ref 10–40)
ALT SERPL-CCNC: 9 U/L (ref 10–40)
ANION GAP SERPL CALCULATED.3IONS-SCNC: 10 MMOL/L (ref 3–16)
ANION GAP SERPL CALCULATED.3IONS-SCNC: 10 MMOL/L (ref 3–16)
ANION GAP SERPL CALCULATED.3IONS-SCNC: 11 MMOL/L (ref 3–16)
ANION GAP SERPL CALCULATED.3IONS-SCNC: 11 MMOL/L (ref 3–16)
ANION GAP SERPL CALCULATED.3IONS-SCNC: 12 MMOL/L (ref 3–16)
ANION GAP SERPL CALCULATED.3IONS-SCNC: 15 MMOL/L (ref 3–16)
ANION GAP SERPL CALCULATED.3IONS-SCNC: 16 MMOL/L (ref 3–16)
ANION GAP SERPL CALCULATED.3IONS-SCNC: 9 MMOL/L (ref 3–16)
ANION GAP SERPL CALCULATED.3IONS-SCNC: 9 MMOL/L (ref 3–16)
ANISOCYTOSIS: ABNORMAL
ANTIBODY SCREEN: NORMAL
AST SERPL-CCNC: 15 U/L (ref 15–37)
AST SERPL-CCNC: 18 U/L (ref 15–37)
AST SERPL-CCNC: 18 U/L (ref 15–37)
AST SERPL-CCNC: 24 U/L (ref 15–37)
BACTERIA: ABNORMAL /HPF
BASOPHILS ABSOLUTE: 0 K/UL (ref 0–0.2)
BASOPHILS ABSOLUTE: 0.1 K/UL (ref 0–0.2)
BASOPHILS RELATIVE PERCENT: 0 %
BASOPHILS RELATIVE PERCENT: 0.3 %
BASOPHILS RELATIVE PERCENT: 0.3 %
BASOPHILS RELATIVE PERCENT: 0.4 %
BASOPHILS RELATIVE PERCENT: 0.5 %
BASOPHILS RELATIVE PERCENT: 0.6 %
BASOPHILS RELATIVE PERCENT: 0.7 %
BASOPHILS RELATIVE PERCENT: 0.9 %
BILIRUB SERPL-MCNC: 0.3 MG/DL (ref 0–1)
BILIRUB SERPL-MCNC: 0.3 MG/DL (ref 0–1)
BILIRUB SERPL-MCNC: 0.4 MG/DL (ref 0–1)
BILIRUB SERPL-MCNC: 0.6 MG/DL (ref 0–1)
BILIRUBIN URINE: ABNORMAL
BILIRUBIN URINE: NEGATIVE
BLOOD, URINE: ABNORMAL
BLOOD, URINE: NEGATIVE
BUN BLDV-MCNC: 17 MG/DL (ref 7–20)
BUN BLDV-MCNC: 20 MG/DL (ref 7–20)
BUN BLDV-MCNC: 23 MG/DL (ref 7–20)
BUN BLDV-MCNC: 23 MG/DL (ref 7–20)
BUN BLDV-MCNC: 26 MG/DL (ref 7–20)
BUN BLDV-MCNC: 30 MG/DL (ref 7–20)
BUN BLDV-MCNC: 30 MG/DL (ref 7–20)
BUN BLDV-MCNC: 52 MG/DL (ref 7–20)
BUN BLDV-MCNC: 55 MG/DL (ref 7–20)
C DIFF TOXIN/ANTIGEN: NORMAL
C-REACTIVE PROTEIN: 189.6 MG/L (ref 0–5.1)
C-REACTIVE PROTEIN: 265 MG/L (ref 0–5.1)
CALCIUM SERPL-MCNC: 10 MG/DL (ref 8.3–10.6)
CALCIUM SERPL-MCNC: 8.3 MG/DL (ref 8.3–10.6)
CALCIUM SERPL-MCNC: 8.5 MG/DL (ref 8.3–10.6)
CALCIUM SERPL-MCNC: 8.5 MG/DL (ref 8.3–10.6)
CALCIUM SERPL-MCNC: 8.6 MG/DL (ref 8.3–10.6)
CALCIUM SERPL-MCNC: 8.7 MG/DL (ref 8.3–10.6)
CALCIUM SERPL-MCNC: 8.8 MG/DL (ref 8.3–10.6)
CALCIUM SERPL-MCNC: 9 MG/DL (ref 8.3–10.6)
CALCIUM SERPL-MCNC: 9.2 MG/DL (ref 8.3–10.6)
CHLORIDE BLD-SCNC: 100 MMOL/L (ref 99–110)
CHLORIDE BLD-SCNC: 101 MMOL/L (ref 99–110)
CHLORIDE BLD-SCNC: 102 MMOL/L (ref 99–110)
CHLORIDE BLD-SCNC: 108 MMOL/L (ref 99–110)
CHLORIDE BLD-SCNC: 94 MMOL/L (ref 99–110)
CHLORIDE BLD-SCNC: 96 MMOL/L (ref 99–110)
CHLORIDE BLD-SCNC: 96 MMOL/L (ref 99–110)
CHLORIDE BLD-SCNC: 97 MMOL/L (ref 99–110)
CHLORIDE BLD-SCNC: 99 MMOL/L (ref 99–110)
CLARITY: CLEAR
CLARITY: CLEAR
CO2: 21 MMOL/L (ref 21–32)
CO2: 21 MMOL/L (ref 21–32)
CO2: 22 MMOL/L (ref 21–32)
CO2: 23 MMOL/L (ref 21–32)
CO2: 24 MMOL/L (ref 21–32)
CO2: 25 MMOL/L (ref 21–32)
CO2: 25 MMOL/L (ref 21–32)
COLOR: YELLOW
COLOR: YELLOW
COMMENT UA: ABNORMAL
CREAT SERPL-MCNC: 1 MG/DL (ref 0.6–1.2)
CREAT SERPL-MCNC: 1.1 MG/DL (ref 0.6–1.2)
CREAT SERPL-MCNC: 1.2 MG/DL (ref 0.6–1.2)
CREAT SERPL-MCNC: 1.2 MG/DL (ref 0.6–1.2)
CREAT SERPL-MCNC: 1.4 MG/DL (ref 0.6–1.2)
CREAT SERPL-MCNC: 1.4 MG/DL (ref 0.6–1.2)
CREAT SERPL-MCNC: 1.5 MG/DL (ref 0.6–1.2)
CREAT SERPL-MCNC: 1.8 MG/DL (ref 0.6–1.2)
CREAT SERPL-MCNC: 1.8 MG/DL (ref 0.6–1.2)
CREATININE URINE: 165 MG/DL (ref 28–259)
D DIMER: 1712 NG/ML DDU (ref 0–229)
D DIMER: 1952 NG/ML DDU (ref 0–229)
D DIMER: 892 NG/ML DDU (ref 0–229)
EKG ATRIAL RATE: 60 BPM
EKG ATRIAL RATE: 68 BPM
EKG DIAGNOSIS: NORMAL
EKG DIAGNOSIS: NORMAL
EKG P AXIS: 78 DEGREES
EKG P AXIS: 82 DEGREES
EKG P-R INTERVAL: 192 MS
EKG P-R INTERVAL: 204 MS
EKG Q-T INTERVAL: 430 MS
EKG Q-T INTERVAL: 440 MS
EKG QRS DURATION: 92 MS
EKG QRS DURATION: 98 MS
EKG QTC CALCULATION (BAZETT): 430 MS
EKG QTC CALCULATION (BAZETT): 467 MS
EKG R AXIS: -65 DEGREES
EKG R AXIS: -66 DEGREES
EKG T AXIS: 45 DEGREES
EKG T AXIS: 47 DEGREES
EKG VENTRICULAR RATE: 60 BPM
EKG VENTRICULAR RATE: 68 BPM
EOSINOPHILS ABSOLUTE: 0 K/UL (ref 0–0.6)
EOSINOPHILS ABSOLUTE: 0.1 K/UL (ref 0–0.6)
EOSINOPHILS ABSOLUTE: 0.3 K/UL (ref 0–0.6)
EOSINOPHILS RELATIVE PERCENT: 0 %
EOSINOPHILS RELATIVE PERCENT: 0.2 %
EOSINOPHILS RELATIVE PERCENT: 0.3 %
EOSINOPHILS RELATIVE PERCENT: 0.4 %
EOSINOPHILS RELATIVE PERCENT: 0.4 %
EOSINOPHILS RELATIVE PERCENT: 0.5 %
EOSINOPHILS RELATIVE PERCENT: 2 %
EOSINOPHILS RELATIVE PERCENT: 2.7 %
EPITHELIAL CELLS, UA: 1 /HPF (ref 0–5)
EPITHELIAL CELLS, UA: 2 /HPF (ref 0–5)
GFR AFRICAN AMERICAN: 32
GFR AFRICAN AMERICAN: 32
GFR AFRICAN AMERICAN: 39
GFR AFRICAN AMERICAN: 43
GFR AFRICAN AMERICAN: 43
GFR AFRICAN AMERICAN: 51
GFR AFRICAN AMERICAN: 51
GFR AFRICAN AMERICAN: 56
GFR AFRICAN AMERICAN: >60
GFR NON-AFRICAN AMERICAN: 26
GFR NON-AFRICAN AMERICAN: 26
GFR NON-AFRICAN AMERICAN: 33
GFR NON-AFRICAN AMERICAN: 35
GFR NON-AFRICAN AMERICAN: 35
GFR NON-AFRICAN AMERICAN: 42
GFR NON-AFRICAN AMERICAN: 42
GFR NON-AFRICAN AMERICAN: 47
GFR NON-AFRICAN AMERICAN: 52
GI BACTERIAL PATHOGENS BY PCR: NORMAL
GLOBULIN: 2.9 G/DL
GLOBULIN: 3.9 G/DL
GLUCOSE BLD-MCNC: 101 MG/DL (ref 70–99)
GLUCOSE BLD-MCNC: 102 MG/DL (ref 70–99)
GLUCOSE BLD-MCNC: 116 MG/DL (ref 70–99)
GLUCOSE BLD-MCNC: 76 MG/DL (ref 70–99)
GLUCOSE BLD-MCNC: 78 MG/DL (ref 70–99)
GLUCOSE BLD-MCNC: 87 MG/DL (ref 70–99)
GLUCOSE BLD-MCNC: 89 MG/DL (ref 70–99)
GLUCOSE BLD-MCNC: 91 MG/DL (ref 70–99)
GLUCOSE BLD-MCNC: 99 MG/DL (ref 70–99)
GLUCOSE URINE: NEGATIVE MG/DL
GLUCOSE URINE: NEGATIVE MG/DL
HCT VFR BLD CALC: 25.6 % (ref 36–48)
HCT VFR BLD CALC: 25.7 % (ref 36–48)
HCT VFR BLD CALC: 25.8 % (ref 36–48)
HCT VFR BLD CALC: 26.4 % (ref 36–48)
HCT VFR BLD CALC: 26.9 % (ref 36–48)
HCT VFR BLD CALC: 27.4 % (ref 36–48)
HCT VFR BLD CALC: 29 % (ref 36–48)
HCT VFR BLD CALC: 29.8 % (ref 36–48)
HCT VFR BLD CALC: 30.2 % (ref 36–48)
HEMOGLOBIN: 10.1 G/DL (ref 12–16)
HEMOGLOBIN: 8.7 G/DL (ref 12–16)
HEMOGLOBIN: 8.8 G/DL (ref 12–16)
HEMOGLOBIN: 9 G/DL (ref 12–16)
HEMOGLOBIN: 9.1 G/DL (ref 12–16)
HEMOGLOBIN: 9.4 G/DL (ref 12–16)
HEMOGLOBIN: 9.6 G/DL (ref 12–16)
HYALINE CASTS: 17 /LPF (ref 0–8)
HYALINE CASTS: 2 /LPF (ref 0–8)
INR BLD: 1.04 (ref 0.88–1.12)
KETONES, URINE: ABNORMAL MG/DL
KETONES, URINE: ABNORMAL MG/DL
LACTIC ACID: 1 MMOL/L (ref 0.4–2)
LACTIC ACID: 1 MMOL/L (ref 0.4–2)
LEUKOCYTE ESTERASE, URINE: ABNORMAL
LEUKOCYTE ESTERASE, URINE: NEGATIVE
LYMPHOCYTES ABSOLUTE: 0.9 K/UL (ref 1–5.1)
LYMPHOCYTES ABSOLUTE: 1 K/UL (ref 1–5.1)
LYMPHOCYTES ABSOLUTE: 1.1 K/UL (ref 1–5.1)
LYMPHOCYTES RELATIVE PERCENT: 11.2 %
LYMPHOCYTES RELATIVE PERCENT: 21.8 %
LYMPHOCYTES RELATIVE PERCENT: 4 %
LYMPHOCYTES RELATIVE PERCENT: 5.5 %
LYMPHOCYTES RELATIVE PERCENT: 6.3 %
LYMPHOCYTES RELATIVE PERCENT: 6.5 %
LYMPHOCYTES RELATIVE PERCENT: 6.9 %
LYMPHOCYTES RELATIVE PERCENT: 8 %
MAGNESIUM: 2.4 MG/DL (ref 1.8–2.4)
MCH RBC QN AUTO: 29.1 PG (ref 26–34)
MCH RBC QN AUTO: 29.3 PG (ref 26–34)
MCH RBC QN AUTO: 29.4 PG (ref 26–34)
MCH RBC QN AUTO: 31.3 PG (ref 26–34)
MCH RBC QN AUTO: 31.5 PG (ref 26–34)
MCH RBC QN AUTO: 31.5 PG (ref 26–34)
MCH RBC QN AUTO: 31.6 PG (ref 26–34)
MCH RBC QN AUTO: 31.7 PG (ref 26–34)
MCH RBC QN AUTO: 31.9 PG (ref 26–34)
MCHC RBC AUTO-ENTMCNC: 32.1 G/DL (ref 31–36)
MCHC RBC AUTO-ENTMCNC: 32.2 G/DL (ref 31–36)
MCHC RBC AUTO-ENTMCNC: 32.3 G/DL (ref 31–36)
MCHC RBC AUTO-ENTMCNC: 33.5 G/DL (ref 31–36)
MCHC RBC AUTO-ENTMCNC: 33.6 G/DL (ref 31–36)
MCHC RBC AUTO-ENTMCNC: 33.9 G/DL (ref 31–36)
MCHC RBC AUTO-ENTMCNC: 34.1 G/DL (ref 31–36)
MCHC RBC AUTO-ENTMCNC: 34.2 G/DL (ref 31–36)
MCHC RBC AUTO-ENTMCNC: 34.2 G/DL (ref 31–36)
MCV RBC AUTO: 90.5 FL (ref 80–100)
MCV RBC AUTO: 90.9 FL (ref 80–100)
MCV RBC AUTO: 91 FL (ref 80–100)
MCV RBC AUTO: 92.4 FL (ref 80–100)
MCV RBC AUTO: 92.6 FL (ref 80–100)
MCV RBC AUTO: 92.7 FL (ref 80–100)
MCV RBC AUTO: 93.1 FL (ref 80–100)
MCV RBC AUTO: 93.8 FL (ref 80–100)
MCV RBC AUTO: 94.4 FL (ref 80–100)
MICROCYTES: ABNORMAL
MICROSCOPIC EXAMINATION: YES
MICROSCOPIC EXAMINATION: YES
MONOCYTES ABSOLUTE: 0.1 K/UL (ref 0–1.3)
MONOCYTES ABSOLUTE: 0.6 K/UL (ref 0–1.3)
MONOCYTES ABSOLUTE: 0.6 K/UL (ref 0–1.3)
MONOCYTES ABSOLUTE: 0.7 K/UL (ref 0–1.3)
MONOCYTES ABSOLUTE: 0.7 K/UL (ref 0–1.3)
MONOCYTES ABSOLUTE: 0.8 K/UL (ref 0–1.3)
MONOCYTES ABSOLUTE: 0.9 K/UL (ref 0–1.3)
MONOCYTES ABSOLUTE: 1 K/UL (ref 0–1.3)
MONOCYTES RELATIVE PERCENT: 1 %
MONOCYTES RELATIVE PERCENT: 11.7 %
MONOCYTES RELATIVE PERCENT: 3.4 %
MONOCYTES RELATIVE PERCENT: 5 %
MONOCYTES RELATIVE PERCENT: 5.2 %
MONOCYTES RELATIVE PERCENT: 5.4 %
MONOCYTES RELATIVE PERCENT: 6.4 %
MONOCYTES RELATIVE PERCENT: 7.1 %
NEUTROPHILS ABSOLUTE: 11.6 K/UL (ref 1.7–7.7)
NEUTROPHILS ABSOLUTE: 11.8 K/UL (ref 1.7–7.7)
NEUTROPHILS ABSOLUTE: 12 K/UL (ref 1.7–7.7)
NEUTROPHILS ABSOLUTE: 13.4 K/UL (ref 1.7–7.7)
NEUTROPHILS ABSOLUTE: 16.5 K/UL (ref 1.7–7.7)
NEUTROPHILS ABSOLUTE: 19.9 K/UL (ref 1.7–7.7)
NEUTROPHILS ABSOLUTE: 3 K/UL (ref 1.7–7.7)
NEUTROPHILS ABSOLUTE: 7.3 K/UL (ref 1.7–7.7)
NEUTROPHILS RELATIVE PERCENT: 62.9 %
NEUTROPHILS RELATIVE PERCENT: 80.9 %
NEUTROPHILS RELATIVE PERCENT: 85.9 %
NEUTROPHILS RELATIVE PERCENT: 87.1 %
NEUTROPHILS RELATIVE PERCENT: 87.5 %
NEUTROPHILS RELATIVE PERCENT: 88.8 %
NEUTROPHILS RELATIVE PERCENT: 89 %
NEUTROPHILS RELATIVE PERCENT: 92.3 %
NITRITE, URINE: NEGATIVE
NITRITE, URINE: NEGATIVE
ORGANISM: ABNORMAL
ORGANISM: ABNORMAL
OSMOLALITY URINE: 537 MOSM/KG (ref 390–1070)
OVALOCYTES: ABNORMAL
PDW BLD-RTO: 14.8 % (ref 12.4–15.4)
PDW BLD-RTO: 14.9 % (ref 12.4–15.4)
PDW BLD-RTO: 15 % (ref 12.4–15.4)
PDW BLD-RTO: 15.1 % (ref 12.4–15.4)
PDW BLD-RTO: 15.2 % (ref 12.4–15.4)
PDW BLD-RTO: 15.4 % (ref 12.4–15.4)
PDW BLD-RTO: 16.6 % (ref 12.4–15.4)
PDW BLD-RTO: 16.6 % (ref 12.4–15.4)
PDW BLD-RTO: 17 % (ref 12.4–15.4)
PH UA: 6 (ref 5–8)
PH UA: 6 (ref 5–8)
PLATELET # BLD: 250 K/UL (ref 135–450)
PLATELET # BLD: 255 K/UL (ref 135–450)
PLATELET # BLD: 275 K/UL (ref 135–450)
PLATELET # BLD: 390 K/UL (ref 135–450)
PLATELET # BLD: 423 K/UL (ref 135–450)
PLATELET # BLD: 425 K/UL (ref 135–450)
PLATELET # BLD: 450 K/UL (ref 135–450)
PLATELET # BLD: 457 K/UL (ref 135–450)
PLATELET # BLD: 496 K/UL (ref 135–450)
PLATELET SLIDE REVIEW: ABNORMAL
PMV BLD AUTO: 6.2 FL (ref 5–10.5)
PMV BLD AUTO: 6.3 FL (ref 5–10.5)
PMV BLD AUTO: 6.5 FL (ref 5–10.5)
PMV BLD AUTO: 6.6 FL (ref 5–10.5)
PMV BLD AUTO: 6.6 FL (ref 5–10.5)
PMV BLD AUTO: 6.7 FL (ref 5–10.5)
PMV BLD AUTO: 7.2 FL (ref 5–10.5)
PMV BLD AUTO: 7.6 FL (ref 5–10.5)
PMV BLD AUTO: 7.7 FL (ref 5–10.5)
POLYCHROMASIA: ABNORMAL
POTASSIUM REFLEX MAGNESIUM: 3.5 MMOL/L (ref 3.5–5.1)
POTASSIUM REFLEX MAGNESIUM: 4 MMOL/L (ref 3.5–5.1)
POTASSIUM REFLEX MAGNESIUM: 4.7 MMOL/L (ref 3.5–5.1)
POTASSIUM SERPL-SCNC: 3.4 MMOL/L (ref 3.5–5.1)
POTASSIUM SERPL-SCNC: 3.5 MMOL/L (ref 3.5–5.1)
POTASSIUM SERPL-SCNC: 3.7 MMOL/L (ref 3.5–5.1)
POTASSIUM SERPL-SCNC: 3.8 MMOL/L (ref 3.5–5.1)
POTASSIUM SERPL-SCNC: 3.8 MMOL/L (ref 3.5–5.1)
POTASSIUM SERPL-SCNC: 4.7 MMOL/L (ref 3.5–5.1)
PRO-BNP: 1406 PG/ML (ref 0–449)
PROCALCITONIN: 1.94 NG/ML (ref 0–0.15)
PROCALCITONIN: 2.36 NG/ML (ref 0–0.15)
PROTEIN UA: 30 MG/DL
PROTEIN UA: ABNORMAL MG/DL
PROTHROMBIN TIME: 11.8 SEC (ref 9.9–12.7)
RBC # BLD: 2.75 M/UL (ref 4–5.2)
RBC # BLD: 2.78 M/UL (ref 4–5.2)
RBC # BLD: 2.78 M/UL (ref 4–5.2)
RBC # BLD: 2.85 M/UL (ref 4–5.2)
RBC # BLD: 2.87 M/UL (ref 4–5.2)
RBC # BLD: 3.02 M/UL (ref 4–5.2)
RBC # BLD: 3.19 M/UL (ref 4–5.2)
RBC # BLD: 3.2 M/UL (ref 4–5.2)
RBC # BLD: 3.3 M/UL (ref 4–5.2)
RBC UA: 3 /HPF (ref 0–4)
SARS-COV-2, NAAT: DETECTED
SARS-COV-2, PCR: NOT DETECTED
SARS-COV-2: NOT DETECTED
SLIDE REVIEW: ABNORMAL
SODIUM BLD-SCNC: 128 MMOL/L (ref 136–145)
SODIUM BLD-SCNC: 130 MMOL/L (ref 136–145)
SODIUM BLD-SCNC: 130 MMOL/L (ref 136–145)
SODIUM BLD-SCNC: 132 MMOL/L (ref 136–145)
SODIUM BLD-SCNC: 133 MMOL/L (ref 136–145)
SODIUM BLD-SCNC: 133 MMOL/L (ref 136–145)
SODIUM BLD-SCNC: 137 MMOL/L (ref 136–145)
SODIUM BLD-SCNC: 139 MMOL/L (ref 136–145)
SODIUM BLD-SCNC: 141 MMOL/L (ref 136–145)
SODIUM URINE: 34 MMOL/L
SPECIFIC GRAVITY UA: 1.01 (ref 1–1.03)
SPECIFIC GRAVITY UA: 1.03 (ref 1–1.03)
T4 FREE: 3 NG/DL (ref 0.9–1.8)
T4 FREE: 3 NG/DL (ref 0.9–1.8)
TARGET CELLS: ABNORMAL
TOTAL PROTEIN: 6.3 G/DL (ref 6.4–8.2)
TOTAL PROTEIN: 6.4 G/DL (ref 6.4–8.2)
TOTAL PROTEIN: 7.4 G/DL (ref 6.4–8.2)
TOTAL PROTEIN: 7.7 G/DL (ref 6.4–8.2)
TROPONIN: 0.11 NG/ML
TROPONIN: 0.2 NG/ML
TROPONIN: <0.01 NG/ML
TSH REFLEX FT4: 0.09 UIU/ML (ref 0.27–4.2)
TSH REFLEX: 0.09 UIU/ML (ref 0.27–4.2)
URINE CULTURE, ROUTINE: ABNORMAL
URINE REFLEX TO CULTURE: ABNORMAL
URINE REFLEX TO CULTURE: YES
URINE TYPE: ABNORMAL
URINE TYPE: ABNORMAL
UROBILINOGEN, URINE: 0.2 E.U./DL
UROBILINOGEN, URINE: 0.2 E.U./DL
WBC # BLD: 13.3 K/UL (ref 4–11)
WBC # BLD: 13.6 K/UL (ref 4–11)
WBC # BLD: 13.8 K/UL (ref 4–11)
WBC # BLD: 15.3 K/UL (ref 4–11)
WBC # BLD: 18.6 K/UL (ref 4–11)
WBC # BLD: 21.6 K/UL (ref 4–11)
WBC # BLD: 4.7 K/UL (ref 4–11)
WBC # BLD: 5.5 K/UL (ref 4–11)
WBC # BLD: 9 K/UL (ref 4–11)
WBC UA: 3 /HPF (ref 0–5)
WBC UA: 32 /HPF (ref 0–5)

## 2021-01-01 PROCEDURE — 97166 OT EVAL MOD COMPLEX 45 MIN: CPT

## 2021-01-01 PROCEDURE — 97116 GAIT TRAINING THERAPY: CPT

## 2021-01-01 PROCEDURE — 84443 ASSAY THYROID STIM HORMONE: CPT

## 2021-01-01 PROCEDURE — 6360000002 HC RX W HCPCS: Performed by: INTERNAL MEDICINE

## 2021-01-01 PROCEDURE — G2066 INTER DEVC REMOTE 30D: HCPCS | Performed by: INTERNAL MEDICINE

## 2021-01-01 PROCEDURE — 97530 THERAPEUTIC ACTIVITIES: CPT

## 2021-01-01 PROCEDURE — 2700000000 HC OXYGEN THERAPY PER DAY

## 2021-01-01 PROCEDURE — 83935 ASSAY OF URINE OSMOLALITY: CPT

## 2021-01-01 PROCEDURE — 92526 ORAL FUNCTION THERAPY: CPT

## 2021-01-01 PROCEDURE — 6360000002 HC RX W HCPCS: Performed by: PHYSICIAN ASSISTANT

## 2021-01-01 PROCEDURE — 85379 FIBRIN DEGRADATION QUANT: CPT

## 2021-01-01 PROCEDURE — 86140 C-REACTIVE PROTEIN: CPT

## 2021-01-01 PROCEDURE — 6370000000 HC RX 637 (ALT 250 FOR IP): Performed by: INTERNAL MEDICINE

## 2021-01-01 PROCEDURE — 6360000002 HC RX W HCPCS: Performed by: STUDENT IN AN ORGANIZED HEALTH CARE EDUCATION/TRAINING PROGRAM

## 2021-01-01 PROCEDURE — 99284 EMERGENCY DEPT VISIT MOD MDM: CPT

## 2021-01-01 PROCEDURE — 36415 COLL VENOUS BLD VENIPUNCTURE: CPT

## 2021-01-01 PROCEDURE — 2580000003 HC RX 258: Performed by: NURSE PRACTITIONER

## 2021-01-01 PROCEDURE — 80048 BASIC METABOLIC PNL TOTAL CA: CPT

## 2021-01-01 PROCEDURE — 71045 X-RAY EXAM CHEST 1 VIEW: CPT

## 2021-01-01 PROCEDURE — 6360000002 HC RX W HCPCS: Performed by: NURSE PRACTITIONER

## 2021-01-01 PROCEDURE — 84300 ASSAY OF URINE SODIUM: CPT

## 2021-01-01 PROCEDURE — 84484 ASSAY OF TROPONIN QUANT: CPT

## 2021-01-01 PROCEDURE — 93298 REM INTERROG DEV EVAL SCRMS: CPT | Performed by: INTERNAL MEDICINE

## 2021-01-01 PROCEDURE — 94640 AIRWAY INHALATION TREATMENT: CPT

## 2021-01-01 PROCEDURE — 6370000000 HC RX 637 (ALT 250 FOR IP): Performed by: HOSPITALIST

## 2021-01-01 PROCEDURE — U0003 INFECTIOUS AGENT DETECTION BY NUCLEIC ACID (DNA OR RNA); SEVERE ACUTE RESPIRATORY SYNDROME CORONAVIRUS 2 (SARS-COV-2) (CORONAVIRUS DISEASE [COVID-19]), AMPLIFIED PROBE TECHNIQUE, MAKING USE OF HIGH THROUGHPUT TECHNOLOGIES AS DESCRIBED BY CMS-2020-01-R: HCPCS

## 2021-01-01 PROCEDURE — G8484 FLU IMMUNIZE NO ADMIN: HCPCS | Performed by: ORTHOPAEDIC SURGERY

## 2021-01-01 PROCEDURE — 6370000000 HC RX 637 (ALT 250 FOR IP): Performed by: NURSE PRACTITIONER

## 2021-01-01 PROCEDURE — 94761 N-INVAS EAR/PLS OXIMETRY MLT: CPT

## 2021-01-01 PROCEDURE — 71046 X-RAY EXAM CHEST 2 VIEWS: CPT

## 2021-01-01 PROCEDURE — 87077 CULTURE AEROBIC IDENTIFY: CPT

## 2021-01-01 PROCEDURE — G8419 CALC BMI OUT NRM PARAM NOF/U: HCPCS | Performed by: ORTHOPAEDIC SURGERY

## 2021-01-01 PROCEDURE — 2580000003 HC RX 258: Performed by: INTERNAL MEDICINE

## 2021-01-01 PROCEDURE — U0005 INFEC AGEN DETEC AMPLI PROBE: HCPCS

## 2021-01-01 PROCEDURE — 1200000000 HC SEMI PRIVATE

## 2021-01-01 PROCEDURE — 85025 COMPLETE CBC W/AUTO DIFF WBC: CPT

## 2021-01-01 PROCEDURE — 85610 PROTHROMBIN TIME: CPT

## 2021-01-01 PROCEDURE — 80053 COMPREHEN METABOLIC PANEL: CPT

## 2021-01-01 PROCEDURE — 81001 URINALYSIS AUTO W/SCOPE: CPT

## 2021-01-01 PROCEDURE — 1123F ACP DISCUSS/DSCN MKR DOCD: CPT | Performed by: ORTHOPAEDIC SURGERY

## 2021-01-01 PROCEDURE — 99223 1ST HOSP IP/OBS HIGH 75: CPT | Performed by: INTERNAL MEDICINE

## 2021-01-01 PROCEDURE — 86900 BLOOD TYPING SEROLOGIC ABO: CPT

## 2021-01-01 PROCEDURE — 94760 N-INVAS EAR/PLS OXIMETRY 1: CPT

## 2021-01-01 PROCEDURE — 86850 RBC ANTIBODY SCREEN: CPT

## 2021-01-01 PROCEDURE — 1090F PRES/ABSN URINE INCON ASSESS: CPT | Performed by: ORTHOPAEDIC SURGERY

## 2021-01-01 PROCEDURE — 97535 SELF CARE MNGMENT TRAINING: CPT

## 2021-01-01 PROCEDURE — 6360000004 HC RX CONTRAST MEDICATION: Performed by: PHYSICIAN ASSISTANT

## 2021-01-01 PROCEDURE — 97110 THERAPEUTIC EXERCISES: CPT

## 2021-01-01 PROCEDURE — 83880 ASSAY OF NATRIURETIC PEPTIDE: CPT

## 2021-01-01 PROCEDURE — 87635 SARS-COV-2 COVID-19 AMP PRB: CPT

## 2021-01-01 PROCEDURE — 87186 SC STD MICRODIL/AGAR DIL: CPT

## 2021-01-01 PROCEDURE — 93005 ELECTROCARDIOGRAM TRACING: CPT | Performed by: HOSPITALIST

## 2021-01-01 PROCEDURE — 92610 EVALUATE SWALLOWING FUNCTION: CPT

## 2021-01-01 PROCEDURE — 87505 NFCT AGENT DETECTION GI: CPT

## 2021-01-01 PROCEDURE — 87449 NOS EACH ORGANISM AG IA: CPT

## 2021-01-01 PROCEDURE — 84145 PROCALCITONIN (PCT): CPT

## 2021-01-01 PROCEDURE — 83735 ASSAY OF MAGNESIUM: CPT

## 2021-01-01 PROCEDURE — 82570 ASSAY OF URINE CREATININE: CPT

## 2021-01-01 PROCEDURE — 84439 ASSAY OF FREE THYROXINE: CPT

## 2021-01-01 PROCEDURE — 87086 URINE CULTURE/COLONY COUNT: CPT

## 2021-01-01 PROCEDURE — 85027 COMPLETE CBC AUTOMATED: CPT

## 2021-01-01 PROCEDURE — 4040F PNEUMOC VAC/ADMIN/RCVD: CPT | Performed by: ORTHOPAEDIC SURGERY

## 2021-01-01 PROCEDURE — 2580000003 HC RX 258: Performed by: PHYSICIAN ASSISTANT

## 2021-01-01 PROCEDURE — 93010 ELECTROCARDIOGRAM REPORT: CPT | Performed by: INTERNAL MEDICINE

## 2021-01-01 PROCEDURE — 1036F TOBACCO NON-USER: CPT | Performed by: ORTHOPAEDIC SURGERY

## 2021-01-01 PROCEDURE — 96374 THER/PROPH/DIAG INJ IV PUSH: CPT

## 2021-01-01 PROCEDURE — 97162 PT EVAL MOD COMPLEX 30 MIN: CPT

## 2021-01-01 PROCEDURE — 83605 ASSAY OF LACTIC ACID: CPT

## 2021-01-01 PROCEDURE — 87324 CLOSTRIDIUM AG IA: CPT

## 2021-01-01 PROCEDURE — 93005 ELECTROCARDIOGRAM TRACING: CPT

## 2021-01-01 PROCEDURE — 86901 BLOOD TYPING SEROLOGIC RH(D): CPT

## 2021-01-01 PROCEDURE — G8427 DOCREV CUR MEDS BY ELIG CLIN: HCPCS | Performed by: ORTHOPAEDIC SURGERY

## 2021-01-01 PROCEDURE — 99213 OFFICE O/P EST LOW 20 MIN: CPT | Performed by: ORTHOPAEDIC SURGERY

## 2021-01-01 PROCEDURE — 71260 CT THORAX DX C+: CPT

## 2021-01-01 RX ORDER — UBIDECARENONE 75 MG
50 CAPSULE ORAL DAILY
Status: DISCONTINUED | OUTPATIENT
Start: 2021-01-01 | End: 2021-01-01 | Stop reason: HOSPADM

## 2021-01-01 RX ORDER — AMOXICILLIN AND CLAVULANATE POTASSIUM 500; 125 MG/1; MG/1
1 TABLET, FILM COATED ORAL EVERY 12 HOURS SCHEDULED
Qty: 20 TABLET | Refills: 0
Start: 2021-01-01 | End: 2021-01-01

## 2021-01-01 RX ORDER — LISINOPRIL 5 MG/1
2.5 TABLET ORAL DAILY
Status: DISCONTINUED | OUTPATIENT
Start: 2021-01-01 | End: 2021-01-01 | Stop reason: HOSPADM

## 2021-01-01 RX ORDER — SIMVASTATIN 10 MG
10 TABLET ORAL NIGHTLY
Status: DISCONTINUED | OUTPATIENT
Start: 2021-01-01 | End: 2021-01-01

## 2021-01-01 RX ORDER — LEVOTHYROXINE SODIUM 0.1 MG/1
100 TABLET ORAL DAILY
Status: DISCONTINUED | OUTPATIENT
Start: 2021-01-01 | End: 2021-01-01 | Stop reason: HOSPADM

## 2021-01-01 RX ORDER — ATORVASTATIN CALCIUM 10 MG/1
10 TABLET, FILM COATED ORAL NIGHTLY
Status: DISCONTINUED | OUTPATIENT
Start: 2021-01-01 | End: 2021-01-01 | Stop reason: HOSPADM

## 2021-01-01 RX ORDER — AMOXICILLIN AND CLAVULANATE POTASSIUM 500; 125 MG/1; MG/1
1 TABLET, FILM COATED ORAL EVERY 12 HOURS SCHEDULED
Status: DISCONTINUED | OUTPATIENT
Start: 2021-01-01 | End: 2021-01-01 | Stop reason: HOSPADM

## 2021-01-01 RX ORDER — LOPERAMIDE HYDROCHLORIDE 2 MG/1
2 CAPSULE ORAL 4 TIMES DAILY PRN
Status: DISCONTINUED | OUTPATIENT
Start: 2021-01-01 | End: 2021-01-01 | Stop reason: HOSPADM

## 2021-01-01 RX ORDER — DONEPEZIL HYDROCHLORIDE 5 MG/1
5 TABLET, FILM COATED ORAL NIGHTLY
Status: DISCONTINUED | OUTPATIENT
Start: 2021-01-01 | End: 2021-01-01 | Stop reason: HOSPADM

## 2021-01-01 RX ORDER — SODIUM CHLORIDE 0.9 % (FLUSH) 0.9 %
5-40 SYRINGE (ML) INJECTION PRN
Status: DISCONTINUED | OUTPATIENT
Start: 2021-01-01 | End: 2021-01-01 | Stop reason: HOSPADM

## 2021-01-01 RX ORDER — SERTRALINE HYDROCHLORIDE 25 MG/1
25 TABLET, FILM COATED ORAL DAILY
Status: DISCONTINUED | OUTPATIENT
Start: 2021-01-01 | End: 2021-01-01

## 2021-01-01 RX ORDER — HYDROCODONE BITARTRATE AND ACETAMINOPHEN 5; 325 MG/1; MG/1
1 TABLET ORAL EVERY 6 HOURS PRN
Status: DISCONTINUED | OUTPATIENT
Start: 2021-01-01 | End: 2021-01-01 | Stop reason: HOSPADM

## 2021-01-01 RX ORDER — ACETAMINOPHEN 650 MG/1
650 SUPPOSITORY RECTAL EVERY 6 HOURS PRN
Status: DISCONTINUED | OUTPATIENT
Start: 2021-01-01 | End: 2021-01-01 | Stop reason: HOSPADM

## 2021-01-01 RX ORDER — ATORVASTATIN CALCIUM 10 MG/1
10 TABLET, FILM COATED ORAL DAILY
Status: DISCONTINUED | OUTPATIENT
Start: 2021-01-01 | End: 2021-01-01

## 2021-01-01 RX ORDER — ACETAMINOPHEN 325 MG/1
650 TABLET ORAL EVERY 6 HOURS PRN
Status: DISCONTINUED | OUTPATIENT
Start: 2021-01-01 | End: 2021-01-01 | Stop reason: HOSPADM

## 2021-01-01 RX ORDER — MORPHINE SULFATE 2 MG/ML
2 INJECTION, SOLUTION INTRAMUSCULAR; INTRAVENOUS ONCE
Status: COMPLETED | OUTPATIENT
Start: 2021-01-01 | End: 2021-01-01

## 2021-01-01 RX ORDER — ONDANSETRON 4 MG/1
4 TABLET, ORALLY DISINTEGRATING ORAL EVERY 8 HOURS PRN
Status: DISCONTINUED | OUTPATIENT
Start: 2021-01-01 | End: 2021-01-01 | Stop reason: HOSPADM

## 2021-01-01 RX ORDER — LEVOTHYROXINE SODIUM 88 UG/1
88 TABLET ORAL DAILY
Status: DISCONTINUED | OUTPATIENT
Start: 2021-01-01 | End: 2021-01-01

## 2021-01-01 RX ORDER — SODIUM CHLORIDE 0.9 % (FLUSH) 0.9 %
5-40 SYRINGE (ML) INJECTION EVERY 12 HOURS SCHEDULED
Status: DISCONTINUED | OUTPATIENT
Start: 2021-01-01 | End: 2021-01-01 | Stop reason: HOSPADM

## 2021-01-01 RX ORDER — SODIUM CHLORIDE 9 MG/ML
25 INJECTION, SOLUTION INTRAVENOUS PRN
Status: DISCONTINUED | OUTPATIENT
Start: 2021-01-01 | End: 2021-01-01 | Stop reason: HOSPADM

## 2021-01-01 RX ORDER — MULTIVITAMIN WITH IRON
1 TABLET ORAL DAILY
Status: DISCONTINUED | OUTPATIENT
Start: 2021-01-01 | End: 2021-01-01 | Stop reason: HOSPADM

## 2021-01-01 RX ORDER — LEVOFLOXACIN 250 MG/1
250 TABLET ORAL DAILY
Status: DISCONTINUED | OUTPATIENT
Start: 2021-01-01 | End: 2021-01-01 | Stop reason: HOSPADM

## 2021-01-01 RX ORDER — MIRTAZAPINE 15 MG/1
7.5 TABLET, FILM COATED ORAL NIGHTLY
COMMUNITY

## 2021-01-01 RX ORDER — IPRATROPIUM BROMIDE AND ALBUTEROL SULFATE 2.5; .5 MG/3ML; MG/3ML
1 SOLUTION RESPIRATORY (INHALATION)
Status: DISCONTINUED | OUTPATIENT
Start: 2021-01-01 | End: 2021-01-01

## 2021-01-01 RX ORDER — HYDRALAZINE HYDROCHLORIDE 20 MG/ML
10 INJECTION INTRAMUSCULAR; INTRAVENOUS EVERY 6 HOURS PRN
Status: DISCONTINUED | OUTPATIENT
Start: 2021-01-01 | End: 2021-01-01 | Stop reason: HOSPADM

## 2021-01-01 RX ORDER — AMOXICILLIN AND CLAVULANATE POTASSIUM 875; 125 MG/1; MG/1
1 TABLET, FILM COATED ORAL 2 TIMES DAILY
Qty: 14 TABLET | Refills: 0
Start: 2021-01-01 | End: 2021-01-01 | Stop reason: HOSPADM

## 2021-01-01 RX ORDER — LEVOTHYROXINE SODIUM 0.03 MG/1
50 TABLET ORAL
Status: DISCONTINUED | OUTPATIENT
Start: 2021-01-01 | End: 2021-01-01 | Stop reason: HOSPADM

## 2021-01-01 RX ORDER — TEA TREE OIL 100 %
1 SPRAY, NON-AEROSOL (ML) TOPICAL NIGHTLY PRN
Status: ON HOLD | COMMUNITY
End: 2021-01-01 | Stop reason: HOSPADM

## 2021-01-01 RX ORDER — HEPARIN SODIUM 5000 [USP'U]/ML
5000 INJECTION, SOLUTION INTRAVENOUS; SUBCUTANEOUS EVERY 8 HOURS SCHEDULED
Status: DISCONTINUED | OUTPATIENT
Start: 2021-01-01 | End: 2021-01-01

## 2021-01-01 RX ORDER — NITROFURANTOIN 25; 75 MG/1; MG/1
100 CAPSULE ORAL EVERY 12 HOURS SCHEDULED
Status: DISCONTINUED | OUTPATIENT
Start: 2021-01-01 | End: 2021-01-01

## 2021-01-01 RX ORDER — OXYBUTYNIN CHLORIDE 5 MG/1
5 TABLET ORAL NIGHTLY
Status: DISCONTINUED | OUTPATIENT
Start: 2021-01-01 | End: 2021-01-01 | Stop reason: HOSPADM

## 2021-01-01 RX ORDER — ASPIRIN 81 MG/1
162 TABLET, CHEWABLE ORAL ONCE
Status: COMPLETED | OUTPATIENT
Start: 2021-01-01 | End: 2021-01-01

## 2021-01-01 RX ORDER — ALBUTEROL SULFATE 90 UG/1
2 AEROSOL, METERED RESPIRATORY (INHALATION) 4 TIMES DAILY
Status: DISCONTINUED | OUTPATIENT
Start: 2021-01-01 | End: 2021-01-01 | Stop reason: HOSPADM

## 2021-01-01 RX ORDER — AZITHROMYCIN 250 MG/1
500 TABLET, FILM COATED ORAL EVERY 24 HOURS
Status: DISCONTINUED | OUTPATIENT
Start: 2021-01-01 | End: 2021-01-01

## 2021-01-01 RX ORDER — ASPIRIN 81 MG/1
81 TABLET, CHEWABLE ORAL DAILY
Qty: 30 TABLET | Refills: 0 | Status: SHIPPED | OUTPATIENT
Start: 2021-01-01

## 2021-01-01 RX ORDER — AMOXICILLIN AND CLAVULANATE POTASSIUM 875; 125 MG/1; MG/1
1 TABLET, FILM COATED ORAL EVERY 12 HOURS SCHEDULED
Status: DISCONTINUED | OUTPATIENT
Start: 2021-01-01 | End: 2021-01-01

## 2021-01-01 RX ORDER — POTASSIUM CHLORIDE 20 MEQ/1
40 TABLET, EXTENDED RELEASE ORAL PRN
Status: DISCONTINUED | OUTPATIENT
Start: 2021-01-01 | End: 2021-01-01

## 2021-01-01 RX ORDER — SERTRALINE HYDROCHLORIDE 100 MG/1
100 TABLET, FILM COATED ORAL DAILY
Status: DISCONTINUED | OUTPATIENT
Start: 2021-01-01 | End: 2021-01-01 | Stop reason: HOSPADM

## 2021-01-01 RX ORDER — LEVOTHYROXINE SODIUM 88 UG/1
88 TABLET ORAL DAILY
Qty: 30 TABLET | Refills: 0
Start: 2021-01-01

## 2021-01-01 RX ORDER — ONDANSETRON 2 MG/ML
4 INJECTION INTRAMUSCULAR; INTRAVENOUS EVERY 6 HOURS PRN
Status: DISCONTINUED | OUTPATIENT
Start: 2021-01-01 | End: 2021-01-01 | Stop reason: HOSPADM

## 2021-01-01 RX ORDER — POTASSIUM CHLORIDE 7.45 MG/ML
10 INJECTION INTRAVENOUS PRN
Status: DISCONTINUED | OUTPATIENT
Start: 2021-01-01 | End: 2021-01-01

## 2021-01-01 RX ORDER — ACETAMINOPHEN 500 MG
500 TABLET ORAL EVERY 4 HOURS PRN
COMMUNITY

## 2021-01-01 RX ORDER — SODIUM CHLORIDE AND POTASSIUM CHLORIDE .9; .15 G/100ML; G/100ML
SOLUTION INTRAVENOUS CONTINUOUS
Status: DISCONTINUED | OUTPATIENT
Start: 2021-01-01 | End: 2021-01-01

## 2021-01-01 RX ORDER — HEPARIN SODIUM 5000 [USP'U]/ML
5000 INJECTION, SOLUTION INTRAVENOUS; SUBCUTANEOUS EVERY 8 HOURS SCHEDULED
Status: DISCONTINUED | OUTPATIENT
Start: 2021-01-01 | End: 2021-01-01 | Stop reason: HOSPADM

## 2021-01-01 RX ORDER — ASPIRIN 81 MG/1
81 TABLET, CHEWABLE ORAL DAILY
Status: DISCONTINUED | OUTPATIENT
Start: 2021-01-01 | End: 2021-01-01 | Stop reason: HOSPADM

## 2021-01-01 RX ORDER — FUROSEMIDE 20 MG/1
20 TABLET ORAL DAILY
Status: DISCONTINUED | OUTPATIENT
Start: 2021-01-01 | End: 2021-01-01 | Stop reason: HOSPADM

## 2021-01-01 RX ORDER — SERTRALINE HYDROCHLORIDE 100 MG/1
100 TABLET, FILM COATED ORAL DAILY
COMMUNITY

## 2021-01-01 RX ORDER — LEVOFLOXACIN 250 MG/1
250 TABLET ORAL DAILY
Qty: 2 TABLET | Refills: 0 | Status: SHIPPED
Start: 2021-01-01 | End: 2021-01-01 | Stop reason: HOSPADM

## 2021-01-01 RX ORDER — ALBUTEROL SULFATE 90 UG/1
2 AEROSOL, METERED RESPIRATORY (INHALATION) 4 TIMES DAILY
Status: DISCONTINUED | OUTPATIENT
Start: 2021-01-01 | End: 2021-01-01

## 2021-01-01 RX ORDER — SODIUM CHLORIDE 9 MG/ML
INJECTION, SOLUTION INTRAVENOUS CONTINUOUS
Status: DISCONTINUED | OUTPATIENT
Start: 2021-01-01 | End: 2021-01-01

## 2021-01-01 RX ORDER — DONEPEZIL HYDROCHLORIDE 5 MG/1
10 TABLET, FILM COATED ORAL NIGHTLY
Status: DISCONTINUED | OUTPATIENT
Start: 2021-01-01 | End: 2021-01-01 | Stop reason: HOSPADM

## 2021-01-01 RX ORDER — SODIUM CHLORIDE 0.9 % (FLUSH) 0.9 %
10 SYRINGE (ML) INJECTION PRN
Status: DISCONTINUED | OUTPATIENT
Start: 2021-01-01 | End: 2021-01-01 | Stop reason: HOSPADM

## 2021-01-01 RX ORDER — 0.9 % SODIUM CHLORIDE 0.9 %
500 INTRAVENOUS SOLUTION INTRAVENOUS PRN
Status: DISCONTINUED | OUTPATIENT
Start: 2021-01-01 | End: 2021-01-01 | Stop reason: HOSPADM

## 2021-01-01 RX ORDER — ATORVASTATIN CALCIUM 40 MG/1
40 TABLET, FILM COATED ORAL DAILY
Status: DISCONTINUED | OUTPATIENT
Start: 2021-01-01 | End: 2021-01-01 | Stop reason: HOSPADM

## 2021-01-01 RX ORDER — AMITRIPTYLINE HYDROCHLORIDE 10 MG/1
10 TABLET, FILM COATED ORAL NIGHTLY
Status: DISCONTINUED | OUTPATIENT
Start: 2021-01-01 | End: 2021-01-01

## 2021-01-01 RX ADMIN — SODIUM CHLORIDE: 9 INJECTION, SOLUTION INTRAVENOUS at 09:48

## 2021-01-01 RX ADMIN — HEPARIN SODIUM 5000 UNITS: 5000 INJECTION INTRAVENOUS; SUBCUTANEOUS at 23:41

## 2021-01-01 RX ADMIN — Medication 10 ML: at 23:41

## 2021-01-01 RX ADMIN — METOPROLOL TARTRATE 12.5 MG: 25 TABLET, FILM COATED ORAL at 10:29

## 2021-01-01 RX ADMIN — METOPROLOL TARTRATE 12.5 MG: 25 TABLET, FILM COATED ORAL at 21:27

## 2021-01-01 RX ADMIN — HEPARIN SODIUM 5000 UNITS: 5000 INJECTION INTRAVENOUS; SUBCUTANEOUS at 21:01

## 2021-01-01 RX ADMIN — THERA TABS 1 TABLET: TAB at 10:32

## 2021-01-01 RX ADMIN — ATORVASTATIN CALCIUM 10 MG: 10 TABLET, FILM COATED ORAL at 08:37

## 2021-01-01 RX ADMIN — VITAM B12 50 MCG: 100 TAB at 09:10

## 2021-01-01 RX ADMIN — ACETAMINOPHEN 650 MG: 325 TABLET ORAL at 13:23

## 2021-01-01 RX ADMIN — HEPARIN SODIUM 5000 UNITS: 5000 INJECTION INTRAVENOUS; SUBCUTANEOUS at 22:23

## 2021-01-01 RX ADMIN — SODIUM CHLORIDE, PRESERVATIVE FREE 10 ML: 5 INJECTION INTRAVENOUS at 21:21

## 2021-01-01 RX ADMIN — SERTRALINE 100 MG: 50 TABLET, FILM COATED ORAL at 10:01

## 2021-01-01 RX ADMIN — HEPARIN SODIUM 5000 UNITS: 5000 INJECTION INTRAVENOUS; SUBCUTANEOUS at 21:28

## 2021-01-01 RX ADMIN — OXYBUTYNIN CHLORIDE 5 MG: 5 TABLET ORAL at 20:18

## 2021-01-01 RX ADMIN — SERTRALINE 100 MG: 50 TABLET, FILM COATED ORAL at 10:33

## 2021-01-01 RX ADMIN — METOPROLOL TARTRATE 12.5 MG: 25 TABLET, FILM COATED ORAL at 09:09

## 2021-01-01 RX ADMIN — CEFEPIME HYDROCHLORIDE 1000 MG: 1 INJECTION, POWDER, FOR SOLUTION INTRAMUSCULAR; INTRAVENOUS at 14:27

## 2021-01-01 RX ADMIN — Medication 10 ML: at 21:39

## 2021-01-01 RX ADMIN — HEPARIN SODIUM 5000 UNITS: 5000 INJECTION INTRAVENOUS; SUBCUTANEOUS at 06:18

## 2021-01-01 RX ADMIN — Medication 50 MCG: at 08:22

## 2021-01-01 RX ADMIN — ALBUTEROL SULFATE 2 PUFF: 90 AEROSOL, METERED RESPIRATORY (INHALATION) at 22:08

## 2021-01-01 RX ADMIN — AZITHROMYCIN MONOHYDRATE 500 MG: 250 TABLET ORAL at 13:13

## 2021-01-01 RX ADMIN — ALBUTEROL SULFATE 2 PUFF: 90 AEROSOL, METERED RESPIRATORY (INHALATION) at 17:02

## 2021-01-01 RX ADMIN — THERA TABS 1 TABLET: TAB at 10:29

## 2021-01-01 RX ADMIN — METOPROLOL TARTRATE 12.5 MG: 25 TABLET, FILM COATED ORAL at 20:12

## 2021-01-01 RX ADMIN — ALBUTEROL SULFATE 2 PUFF: 90 AEROSOL, METERED RESPIRATORY (INHALATION) at 12:48

## 2021-01-01 RX ADMIN — HEPARIN SODIUM 5000 UNITS: 5000 INJECTION INTRAVENOUS; SUBCUTANEOUS at 14:37

## 2021-01-01 RX ADMIN — ATORVASTATIN CALCIUM 10 MG: 10 TABLET, FILM COATED ORAL at 21:26

## 2021-01-01 RX ADMIN — DONEPEZIL HYDROCHLORIDE 10 MG: 5 TABLET, FILM COATED ORAL at 21:00

## 2021-01-01 RX ADMIN — DONEPEZIL HYDROCHLORIDE 10 MG: 5 TABLET, FILM COATED ORAL at 22:23

## 2021-01-01 RX ADMIN — ATORVASTATIN CALCIUM 10 MG: 10 TABLET, FILM COATED ORAL at 22:57

## 2021-01-01 RX ADMIN — IPRATROPIUM BROMIDE AND ALBUTEROL SULFATE 1 AMPULE: .5; 3 SOLUTION RESPIRATORY (INHALATION) at 12:49

## 2021-01-01 RX ADMIN — IPRATROPIUM BROMIDE AND ALBUTEROL SULFATE 1 AMPULE: .5; 3 SOLUTION RESPIRATORY (INHALATION) at 08:59

## 2021-01-01 RX ADMIN — SODIUM CHLORIDE 25 ML: 9 INJECTION, SOLUTION INTRAVENOUS at 23:39

## 2021-01-01 RX ADMIN — IPRATROPIUM BROMIDE AND ALBUTEROL SULFATE 1 AMPULE: .5; 3 SOLUTION RESPIRATORY (INHALATION) at 09:18

## 2021-01-01 RX ADMIN — THERA TABS 1 TABLET: TAB at 09:09

## 2021-01-01 RX ADMIN — THERA TABS 1 TABLET: TAB at 08:57

## 2021-01-01 RX ADMIN — HYDROCODONE BITARTRATE AND ACETAMINOPHEN 1 TABLET: 5; 325 TABLET ORAL at 09:36

## 2021-01-01 RX ADMIN — THERA TABS 1 TABLET: TAB at 10:01

## 2021-01-01 RX ADMIN — DONEPEZIL HYDROCHLORIDE 10 MG: 5 TABLET, FILM COATED ORAL at 21:27

## 2021-01-01 RX ADMIN — Medication 10 ML: at 20:18

## 2021-01-01 RX ADMIN — SERTRALINE 100 MG: 50 TABLET, FILM COATED ORAL at 10:29

## 2021-01-01 RX ADMIN — LEVOTHYROXINE SODIUM 50 MCG: 0.03 TABLET ORAL at 06:04

## 2021-01-01 RX ADMIN — AMOXICILLIN AND CLAVULANATE POTASSIUM 1 TABLET: 500; 125 TABLET, FILM COATED ORAL at 21:00

## 2021-01-01 RX ADMIN — ALBUTEROL SULFATE 2 PUFF: 90 AEROSOL, METERED RESPIRATORY (INHALATION) at 08:30

## 2021-01-01 RX ADMIN — Medication 1000 MG: at 13:13

## 2021-01-01 RX ADMIN — METOPROLOL TARTRATE 12.5 MG: 25 TABLET, FILM COATED ORAL at 08:37

## 2021-01-01 RX ADMIN — HEPARIN SODIUM 5000 UNITS: 5000 INJECTION INTRAVENOUS; SUBCUTANEOUS at 05:53

## 2021-01-01 RX ADMIN — Medication 2 PUFF: at 21:34

## 2021-01-01 RX ADMIN — ATORVASTATIN CALCIUM 10 MG: 10 TABLET, FILM COATED ORAL at 08:22

## 2021-01-01 RX ADMIN — Medication 10 ML: at 09:10

## 2021-01-01 RX ADMIN — HYDROCODONE BITARTRATE AND ACETAMINOPHEN 1 TABLET: 5; 325 TABLET ORAL at 15:52

## 2021-01-01 RX ADMIN — METOPROLOL TARTRATE 12.5 MG: 25 TABLET, FILM COATED ORAL at 22:23

## 2021-01-01 RX ADMIN — HYDROCODONE BITARTRATE AND ACETAMINOPHEN 1 TABLET: 5; 325 TABLET ORAL at 05:57

## 2021-01-01 RX ADMIN — Medication 2 PUFF: at 07:49

## 2021-01-01 RX ADMIN — ATORVASTATIN CALCIUM 10 MG: 10 TABLET, FILM COATED ORAL at 21:01

## 2021-01-01 RX ADMIN — VITAM B12 50 MCG: 100 TAB at 10:02

## 2021-01-01 RX ADMIN — DONEPEZIL HYDROCHLORIDE 5 MG: 5 TABLET, FILM COATED ORAL at 21:18

## 2021-01-01 RX ADMIN — AZITHROMYCIN MONOHYDRATE 500 MG: 500 INJECTION, POWDER, LYOPHILIZED, FOR SOLUTION INTRAVENOUS at 12:50

## 2021-01-01 RX ADMIN — HEPARIN SODIUM 5000 UNITS: 5000 INJECTION INTRAVENOUS; SUBCUTANEOUS at 14:26

## 2021-01-01 RX ADMIN — SODIUM CHLORIDE: 9 INJECTION, SOLUTION INTRAVENOUS at 20:10

## 2021-01-01 RX ADMIN — OXYBUTYNIN CHLORIDE 5 MG: 5 TABLET ORAL at 22:57

## 2021-01-01 RX ADMIN — Medication 50 MCG: at 08:37

## 2021-01-01 RX ADMIN — HEPARIN SODIUM 5000 UNITS: 5000 INJECTION INTRAVENOUS; SUBCUTANEOUS at 21:18

## 2021-01-01 RX ADMIN — METOPROLOL TARTRATE 12.5 MG: 25 TABLET, FILM COATED ORAL at 20:18

## 2021-01-01 RX ADMIN — HEPARIN SODIUM 5000 UNITS: 5000 INJECTION INTRAVENOUS; SUBCUTANEOUS at 05:31

## 2021-01-01 RX ADMIN — METOPROLOL TARTRATE 12.5 MG: 25 TABLET, FILM COATED ORAL at 21:26

## 2021-01-01 RX ADMIN — HEPARIN SODIUM 5000 UNITS: 5000 INJECTION INTRAVENOUS; SUBCUTANEOUS at 22:57

## 2021-01-01 RX ADMIN — VITAM B12 50 MCG: 100 TAB at 08:57

## 2021-01-01 RX ADMIN — ASPIRIN 162 MG: 81 TABLET, CHEWABLE ORAL at 11:52

## 2021-01-01 RX ADMIN — Medication 2 PUFF: at 16:57

## 2021-01-01 RX ADMIN — METOPROLOL TARTRATE 12.5 MG: 25 TABLET, FILM COATED ORAL at 21:00

## 2021-01-01 RX ADMIN — OXYBUTYNIN CHLORIDE 5 MG: 5 TABLET ORAL at 21:26

## 2021-01-01 RX ADMIN — Medication 10 ML: at 23:00

## 2021-01-01 RX ADMIN — Medication 10 ML: at 08:56

## 2021-01-01 RX ADMIN — HEPARIN SODIUM 5000 UNITS: 5000 INJECTION INTRAVENOUS; SUBCUTANEOUS at 13:30

## 2021-01-01 RX ADMIN — HEPARIN SODIUM 5000 UNITS: 5000 INJECTION INTRAVENOUS; SUBCUTANEOUS at 06:48

## 2021-01-01 RX ADMIN — SERTRALINE 100 MG: 50 TABLET, FILM COATED ORAL at 09:45

## 2021-01-01 RX ADMIN — VITAM B12 50 MCG: 100 TAB at 10:32

## 2021-01-01 RX ADMIN — ATORVASTATIN CALCIUM 10 MG: 10 TABLET, FILM COATED ORAL at 20:44

## 2021-01-01 RX ADMIN — Medication 1000 MG: at 12:46

## 2021-01-01 RX ADMIN — NITROFURANTOIN (MONOHYDRATE/MACROCRYSTALS) 100 MG: 75; 25 CAPSULE ORAL at 13:41

## 2021-01-01 RX ADMIN — IPRATROPIUM BROMIDE AND ALBUTEROL SULFATE 1 AMPULE: .5; 3 SOLUTION RESPIRATORY (INHALATION) at 17:02

## 2021-01-01 RX ADMIN — HEPARIN SODIUM 5000 UNITS: 5000 INJECTION INTRAVENOUS; SUBCUTANEOUS at 15:52

## 2021-01-01 RX ADMIN — HYDROCODONE BITARTRATE AND ACETAMINOPHEN 1 TABLET: 5; 325 TABLET ORAL at 17:49

## 2021-01-01 RX ADMIN — IPRATROPIUM BROMIDE AND ALBUTEROL SULFATE 1 AMPULE: .5; 3 SOLUTION RESPIRATORY (INHALATION) at 19:35

## 2021-01-01 RX ADMIN — HEPARIN SODIUM 5000 UNITS: 5000 INJECTION INTRAVENOUS; SUBCUTANEOUS at 16:19

## 2021-01-01 RX ADMIN — IPRATROPIUM BROMIDE 2 PUFF: 17 AEROSOL, METERED RESPIRATORY (INHALATION) at 17:02

## 2021-01-01 RX ADMIN — VITAM B12 50 MCG: 100 TAB at 10:29

## 2021-01-01 RX ADMIN — METOPROLOL TARTRATE 12.5 MG: 25 TABLET, FILM COATED ORAL at 20:44

## 2021-01-01 RX ADMIN — HEPARIN SODIUM 5000 UNITS: 5000 INJECTION INTRAVENOUS; SUBCUTANEOUS at 06:25

## 2021-01-01 RX ADMIN — LEVOFLOXACIN 250 MG: 250 TABLET, FILM COATED ORAL at 11:52

## 2021-01-01 RX ADMIN — CEFEPIME HYDROCHLORIDE 1000 MG: 1 INJECTION, POWDER, FOR SOLUTION INTRAMUSCULAR; INTRAVENOUS at 23:12

## 2021-01-01 RX ADMIN — HEPARIN SODIUM 5000 UNITS: 5000 INJECTION INTRAVENOUS; SUBCUTANEOUS at 05:38

## 2021-01-01 RX ADMIN — CEFEPIME HYDROCHLORIDE 1000 MG: 1 INJECTION, POWDER, FOR SOLUTION INTRAMUSCULAR; INTRAVENOUS at 10:36

## 2021-01-01 RX ADMIN — VITAM B12 50 MCG: 100 TAB at 09:47

## 2021-01-01 RX ADMIN — IPRATROPIUM BROMIDE 2 PUFF: 17 AEROSOL, METERED RESPIRATORY (INHALATION) at 22:08

## 2021-01-01 RX ADMIN — HEPARIN SODIUM 5000 UNITS: 5000 INJECTION INTRAVENOUS; SUBCUTANEOUS at 20:13

## 2021-01-01 RX ADMIN — FUROSEMIDE 20 MG: 20 TABLET ORAL at 09:09

## 2021-01-01 RX ADMIN — ATORVASTATIN CALCIUM 10 MG: 10 TABLET, FILM COATED ORAL at 20:18

## 2021-01-01 RX ADMIN — Medication 50 MCG: at 08:26

## 2021-01-01 RX ADMIN — LEVOTHYROXINE SODIUM 50 MCG: 0.03 TABLET ORAL at 05:31

## 2021-01-01 RX ADMIN — LEVOTHYROXINE SODIUM 50 MCG: 0.03 TABLET ORAL at 06:25

## 2021-01-01 RX ADMIN — IPRATROPIUM BROMIDE AND ALBUTEROL SULFATE 1 AMPULE: .5; 3 SOLUTION RESPIRATORY (INHALATION) at 20:05

## 2021-01-01 RX ADMIN — SODIUM CHLORIDE: 9 INJECTION, SOLUTION INTRAVENOUS at 06:16

## 2021-01-01 RX ADMIN — POTASSIUM CHLORIDE AND SODIUM CHLORIDE: 900; 150 INJECTION, SOLUTION INTRAVENOUS at 22:02

## 2021-01-01 RX ADMIN — METOPROLOL TARTRATE 12.5 MG: 25 TABLET, FILM COATED ORAL at 10:03

## 2021-01-01 RX ADMIN — METOPROLOL TARTRATE 12.5 MG: 25 TABLET, FILM COATED ORAL at 21:18

## 2021-01-01 RX ADMIN — HYDROCODONE BITARTRATE AND ACETAMINOPHEN 1 TABLET: 5; 325 TABLET ORAL at 22:56

## 2021-01-01 RX ADMIN — LEVOTHYROXINE SODIUM 50 MCG: 0.03 TABLET ORAL at 05:38

## 2021-01-01 RX ADMIN — THERA TABS 1 TABLET: TAB at 09:44

## 2021-01-01 RX ADMIN — OXYBUTYNIN CHLORIDE 5 MG: 5 TABLET ORAL at 22:23

## 2021-01-01 RX ADMIN — ONDANSETRON 4 MG: 2 INJECTION INTRAMUSCULAR; INTRAVENOUS at 04:32

## 2021-01-01 RX ADMIN — ATORVASTATIN CALCIUM 10 MG: 10 TABLET, FILM COATED ORAL at 22:23

## 2021-01-01 RX ADMIN — Medication 10 ML: at 09:46

## 2021-01-01 RX ADMIN — FUROSEMIDE 20 MG: 20 TABLET ORAL at 10:29

## 2021-01-01 RX ADMIN — LISINOPRIL 2.5 MG: 5 TABLET ORAL at 09:09

## 2021-01-01 RX ADMIN — LEVOTHYROXINE SODIUM 100 MCG: 0.1 TABLET ORAL at 05:52

## 2021-01-01 RX ADMIN — HEPARIN SODIUM 5000 UNITS: 5000 INJECTION INTRAVENOUS; SUBCUTANEOUS at 20:19

## 2021-01-01 RX ADMIN — LEVOTHYROXINE SODIUM 100 MCG: 0.1 TABLET ORAL at 06:22

## 2021-01-01 RX ADMIN — AMOXICILLIN AND CLAVULANATE POTASSIUM 1 TABLET: 500; 125 TABLET, FILM COATED ORAL at 22:23

## 2021-01-01 RX ADMIN — NITROFURANTOIN (MONOHYDRATE/MACROCRYSTALS) 100 MG: 75; 25 CAPSULE ORAL at 23:53

## 2021-01-01 RX ADMIN — LISINOPRIL 2.5 MG: 5 TABLET ORAL at 10:33

## 2021-01-01 RX ADMIN — IPRATROPIUM BROMIDE AND ALBUTEROL SULFATE 1 AMPULE: .5; 3 SOLUTION RESPIRATORY (INHALATION) at 21:59

## 2021-01-01 RX ADMIN — IPRATROPIUM BROMIDE AND ALBUTEROL SULFATE 1 AMPULE: .5; 3 SOLUTION RESPIRATORY (INHALATION) at 15:54

## 2021-01-01 RX ADMIN — FUROSEMIDE 20 MG: 20 TABLET ORAL at 10:32

## 2021-01-01 RX ADMIN — SERTRALINE 100 MG: 100 TABLET, FILM COATED ORAL at 08:22

## 2021-01-01 RX ADMIN — HEPARIN SODIUM 5000 UNITS: 5000 INJECTION INTRAVENOUS; SUBCUTANEOUS at 05:52

## 2021-01-01 RX ADMIN — HYDROCODONE BITARTRATE AND ACETAMINOPHEN 1 TABLET: 5; 325 TABLET ORAL at 20:44

## 2021-01-01 RX ADMIN — HEPARIN SODIUM 5000 UNITS: 5000 INJECTION INTRAVENOUS; SUBCUTANEOUS at 06:22

## 2021-01-01 RX ADMIN — ONDANSETRON 4 MG: 2 INJECTION INTRAMUSCULAR; INTRAVENOUS at 09:19

## 2021-01-01 RX ADMIN — IPRATROPIUM BROMIDE 2 PUFF: 17 AEROSOL, METERED RESPIRATORY (INHALATION) at 08:30

## 2021-01-01 RX ADMIN — LISINOPRIL 2.5 MG: 5 TABLET ORAL at 08:57

## 2021-01-01 RX ADMIN — ENOXAPARIN SODIUM 40 MG: 100 INJECTION SUBCUTANEOUS at 11:52

## 2021-01-01 RX ADMIN — Medication 10 ML: at 22:22

## 2021-01-01 RX ADMIN — AMOXICILLIN AND CLAVULANATE POTASSIUM 1 TABLET: 500; 125 TABLET, FILM COATED ORAL at 10:29

## 2021-01-01 RX ADMIN — Medication 10 ML: at 10:31

## 2021-01-01 RX ADMIN — METOPROLOL TARTRATE 12.5 MG: 25 TABLET, FILM COATED ORAL at 08:27

## 2021-01-01 RX ADMIN — HEPARIN SODIUM 5000 UNITS: 5000 INJECTION INTRAVENOUS; SUBCUTANEOUS at 13:41

## 2021-01-01 RX ADMIN — LISINOPRIL 2.5 MG: 5 TABLET ORAL at 10:29

## 2021-01-01 RX ADMIN — SODIUM CHLORIDE: 9 INJECTION, SOLUTION INTRAVENOUS at 18:34

## 2021-01-01 RX ADMIN — SERTRALINE 100 MG: 100 TABLET, FILM COATED ORAL at 08:37

## 2021-01-01 RX ADMIN — DONEPEZIL HYDROCHLORIDE 10 MG: 5 TABLET, FILM COATED ORAL at 21:26

## 2021-01-01 RX ADMIN — FUROSEMIDE 20 MG: 20 TABLET ORAL at 09:44

## 2021-01-01 RX ADMIN — FUROSEMIDE 20 MG: 20 TABLET ORAL at 10:02

## 2021-01-01 RX ADMIN — HEPARIN SODIUM 5000 UNITS: 5000 INJECTION INTRAVENOUS; SUBCUTANEOUS at 13:23

## 2021-01-01 RX ADMIN — METOPROLOL TARTRATE 12.5 MG: 25 TABLET, FILM COATED ORAL at 08:22

## 2021-01-01 RX ADMIN — SODIUM CHLORIDE 25 ML: 9 INJECTION, SOLUTION INTRAVENOUS at 23:05

## 2021-01-01 RX ADMIN — METOPROLOL TARTRATE 12.5 MG: 25 TABLET, FILM COATED ORAL at 09:43

## 2021-01-01 RX ADMIN — OXYBUTYNIN CHLORIDE 5 MG: 5 TABLET ORAL at 20:43

## 2021-01-01 RX ADMIN — DONEPEZIL HYDROCHLORIDE 10 MG: 5 TABLET, FILM COATED ORAL at 22:57

## 2021-01-01 RX ADMIN — METOPROLOL TARTRATE 12.5 MG: 25 TABLET, FILM COATED ORAL at 10:33

## 2021-01-01 RX ADMIN — SODIUM CHLORIDE 25 ML: 9 INJECTION, SOLUTION INTRAVENOUS at 10:35

## 2021-01-01 RX ADMIN — Medication 10 ML: at 21:01

## 2021-01-01 RX ADMIN — IPRATROPIUM BROMIDE AND ALBUTEROL SULFATE 1 AMPULE: .5; 3 SOLUTION RESPIRATORY (INHALATION) at 11:21

## 2021-01-01 RX ADMIN — IOPAMIDOL 75 ML: 755 INJECTION, SOLUTION INTRAVENOUS at 11:13

## 2021-01-01 RX ADMIN — DONEPEZIL HYDROCHLORIDE 10 MG: 5 TABLET, FILM COATED ORAL at 20:44

## 2021-01-01 RX ADMIN — IPRATROPIUM BROMIDE AND ALBUTEROL SULFATE 1 AMPULE: .5; 3 SOLUTION RESPIRATORY (INHALATION) at 07:58

## 2021-01-01 RX ADMIN — LOPERAMIDE HYDROCHLORIDE 2 MG: 2 CAPSULE ORAL at 10:33

## 2021-01-01 RX ADMIN — ATORVASTATIN CALCIUM 10 MG: 10 TABLET, FILM COATED ORAL at 21:27

## 2021-01-01 RX ADMIN — DONEPEZIL HYDROCHLORIDE 10 MG: 5 TABLET, FILM COATED ORAL at 20:18

## 2021-01-01 RX ADMIN — HYDROCODONE BITARTRATE AND ACETAMINOPHEN 1 TABLET: 5; 325 TABLET ORAL at 23:05

## 2021-01-01 RX ADMIN — CEFEPIME HYDROCHLORIDE 1000 MG: 1 INJECTION, POWDER, FOR SOLUTION INTRAMUSCULAR; INTRAVENOUS at 23:06

## 2021-01-01 RX ADMIN — CEFEPIME HYDROCHLORIDE 1000 MG: 1 INJECTION, POWDER, FOR SOLUTION INTRAMUSCULAR; INTRAVENOUS at 12:49

## 2021-01-01 RX ADMIN — LISINOPRIL 2.5 MG: 5 TABLET ORAL at 10:01

## 2021-01-01 RX ADMIN — SODIUM CHLORIDE, PRESERVATIVE FREE 10 ML: 5 INJECTION INTRAVENOUS at 09:28

## 2021-01-01 RX ADMIN — HEPARIN SODIUM 5000 UNITS: 5000 INJECTION INTRAVENOUS; SUBCUTANEOUS at 06:04

## 2021-01-01 RX ADMIN — HEPARIN SODIUM 5000 UNITS: 5000 INJECTION INTRAVENOUS; SUBCUTANEOUS at 05:51

## 2021-01-01 RX ADMIN — IPRATROPIUM BROMIDE 2 PUFF: 17 AEROSOL, METERED RESPIRATORY (INHALATION) at 12:48

## 2021-01-01 RX ADMIN — METOPROLOL TARTRATE 12.5 MG: 25 TABLET, FILM COATED ORAL at 22:57

## 2021-01-01 RX ADMIN — OXYBUTYNIN CHLORIDE 5 MG: 5 TABLET ORAL at 21:00

## 2021-01-01 RX ADMIN — LEVOTHYROXINE SODIUM 50 MCG: 0.03 TABLET ORAL at 05:53

## 2021-01-01 RX ADMIN — MORPHINE SULFATE 2 MG: 2 INJECTION, SOLUTION INTRAMUSCULAR; INTRAVENOUS at 05:52

## 2021-01-01 RX ADMIN — SERTRALINE 100 MG: 100 TABLET, FILM COATED ORAL at 08:27

## 2021-01-01 RX ADMIN — LEVOTHYROXINE SODIUM 50 MCG: 0.03 TABLET ORAL at 05:51

## 2021-01-01 RX ADMIN — CEFEPIME HYDROCHLORIDE 1000 MG: 1 INJECTION, POWDER, FOR SOLUTION INTRAMUSCULAR; INTRAVENOUS at 11:25

## 2021-01-01 RX ADMIN — METOPROLOL TARTRATE 12.5 MG: 25 TABLET, FILM COATED ORAL at 08:56

## 2021-01-01 RX ADMIN — CEFEPIME HYDROCHLORIDE 1000 MG: 1 INJECTION, POWDER, FOR SOLUTION INTRAMUSCULAR; INTRAVENOUS at 23:40

## 2021-01-01 RX ADMIN — DONEPEZIL HYDROCHLORIDE 5 MG: 5 TABLET, FILM COATED ORAL at 20:12

## 2021-01-01 RX ADMIN — LEVOTHYROXINE SODIUM 50 MCG: 0.03 TABLET ORAL at 06:48

## 2021-01-01 RX ADMIN — IPRATROPIUM BROMIDE AND ALBUTEROL SULFATE 1 AMPULE: .5; 3 SOLUTION RESPIRATORY (INHALATION) at 21:16

## 2021-01-01 RX ADMIN — OXYBUTYNIN CHLORIDE 5 MG: 5 TABLET ORAL at 21:27

## 2021-01-01 RX ADMIN — ACETAMINOPHEN 650 MG: 325 TABLET ORAL at 21:26

## 2021-01-01 RX ADMIN — LISINOPRIL 2.5 MG: 5 TABLET ORAL at 09:45

## 2021-01-01 RX ADMIN — SERTRALINE 100 MG: 50 TABLET, FILM COATED ORAL at 08:57

## 2021-01-01 RX ADMIN — ATORVASTATIN CALCIUM 10 MG: 10 TABLET, FILM COATED ORAL at 08:26

## 2021-01-01 RX ADMIN — FUROSEMIDE 20 MG: 20 TABLET ORAL at 08:56

## 2021-01-01 RX ADMIN — IPRATROPIUM BROMIDE AND ALBUTEROL SULFATE 1 AMPULE: .5; 3 SOLUTION RESPIRATORY (INHALATION) at 15:52

## 2021-01-01 RX ADMIN — Medication 20 ML: at 10:32

## 2021-01-01 RX ADMIN — HYDROCODONE BITARTRATE AND ACETAMINOPHEN 1 TABLET: 5; 325 TABLET ORAL at 21:01

## 2021-01-01 RX ADMIN — SERTRALINE 100 MG: 50 TABLET, FILM COATED ORAL at 09:09

## 2021-01-01 RX ADMIN — IPRATROPIUM BROMIDE AND ALBUTEROL SULFATE 1 AMPULE: .5; 3 SOLUTION RESPIRATORY (INHALATION) at 12:28

## 2021-01-01 RX ADMIN — HEPARIN SODIUM 5000 UNITS: 5000 INJECTION INTRAVENOUS; SUBCUTANEOUS at 21:26

## 2021-01-01 RX ADMIN — SODIUM CHLORIDE 25 ML: 9 INJECTION, SOLUTION INTRAVENOUS at 14:26

## 2021-01-01 ASSESSMENT — PAIN SCALES - GENERAL
PAINLEVEL_OUTOF10: 9
PAINLEVEL_OUTOF10: 0
PAINLEVEL_OUTOF10: 0
PAINLEVEL_OUTOF10: 6
PAINLEVEL_OUTOF10: 0
PAINLEVEL_OUTOF10: 3
PAINLEVEL_OUTOF10: 4
PAINLEVEL_OUTOF10: 7
PAINLEVEL_OUTOF10: 8
PAINLEVEL_OUTOF10: 0
PAINLEVEL_OUTOF10: 5
PAINLEVEL_OUTOF10: 3
PAINLEVEL_OUTOF10: 9
PAINLEVEL_OUTOF10: 8
PAINLEVEL_OUTOF10: 9
PAINLEVEL_OUTOF10: 6
PAINLEVEL_OUTOF10: 8
PAINLEVEL_OUTOF10: 3
PAINLEVEL_OUTOF10: 6
PAINLEVEL_OUTOF10: 5
PAINLEVEL_OUTOF10: 4
PAINLEVEL_OUTOF10: 0
PAINLEVEL_OUTOF10: 0
PAINLEVEL_OUTOF10: 4
PAINLEVEL_OUTOF10: 6
PAINLEVEL_OUTOF10: 0
PAINLEVEL_OUTOF10: 8
PAINLEVEL_OUTOF10: 6
PAINLEVEL_OUTOF10: 0
PAINLEVEL_OUTOF10: 5
PAINLEVEL_OUTOF10: 0
PAINLEVEL_OUTOF10: 7
PAINLEVEL_OUTOF10: 0

## 2021-01-01 ASSESSMENT — PAIN DESCRIPTION - PAIN TYPE
TYPE: ACUTE PAIN
TYPE: CHRONIC PAIN
TYPE: ACUTE PAIN
TYPE: CHRONIC PAIN
TYPE: CHRONIC PAIN
TYPE: ACUTE PAIN

## 2021-01-01 ASSESSMENT — PAIN DESCRIPTION - DESCRIPTORS
DESCRIPTORS: ACHING
DESCRIPTORS: DISCOMFORT
DESCRIPTORS: ACHING;DISCOMFORT;SHARP
DESCRIPTORS: ACHING
DESCRIPTORS: ACHING

## 2021-01-01 ASSESSMENT — PAIN DESCRIPTION - LOCATION
LOCATION: RIB CAGE
LOCATION: FLANK
LOCATION: BACK
LOCATION: RIB CAGE
LOCATION: CHEST
LOCATION: RIB CAGE

## 2021-01-01 ASSESSMENT — PAIN SCALES - WONG BAKER
WONGBAKER_NUMERICALRESPONSE: 0
WONGBAKER_NUMERICALRESPONSE: 8
WONGBAKER_NUMERICALRESPONSE: 0
WONGBAKER_NUMERICALRESPONSE: 8
WONGBAKER_NUMERICALRESPONSE: 8
WONGBAKER_NUMERICALRESPONSE: 0
WONGBAKER_NUMERICALRESPONSE: 8
WONGBAKER_NUMERICALRESPONSE: 0
WONGBAKER_NUMERICALRESPONSE: 8
WONGBAKER_NUMERICALRESPONSE: 0
WONGBAKER_NUMERICALRESPONSE: 0

## 2021-01-01 ASSESSMENT — ENCOUNTER SYMPTOMS
NAUSEA: 0
CONSTIPATION: 0
DIARRHEA: 0
NAUSEA: 0
BACK PAIN: 0
DIARRHEA: 0
COLOR CHANGE: 0
SHORTNESS OF BREATH: 1
ABDOMINAL PAIN: 0
EYE PAIN: 0
SHORTNESS OF BREATH: 0
VOMITING: 0
GASTROINTESTINAL NEGATIVE: 1
BACK PAIN: 0
ABDOMINAL PAIN: 0
VOMITING: 0
COUGH: 1
CONSTIPATION: 0
RESPIRATORY NEGATIVE: 1
EYE REDNESS: 0

## 2021-01-01 ASSESSMENT — PAIN DESCRIPTION - PROGRESSION
CLINICAL_PROGRESSION: GRADUALLY WORSENING
CLINICAL_PROGRESSION: GRADUALLY IMPROVING
CLINICAL_PROGRESSION: GRADUALLY WORSENING

## 2021-01-01 ASSESSMENT — PAIN DESCRIPTION - FREQUENCY
FREQUENCY: INTERMITTENT

## 2021-01-01 ASSESSMENT — PAIN DESCRIPTION - ORIENTATION
ORIENTATION: LEFT
ORIENTATION: LOWER;LEFT
ORIENTATION: LEFT

## 2021-01-01 ASSESSMENT — PAIN - FUNCTIONAL ASSESSMENT: PAIN_FUNCTIONAL_ASSESSMENT: PREVENTS OR INTERFERES SOME ACTIVE ACTIVITIES AND ADLS

## 2021-01-01 ASSESSMENT — PAIN DESCRIPTION - ONSET: ONSET: GRADUAL

## 2021-01-15 NOTE — PROGRESS NOTES
DIAGNOSIS:  Left femur impacted neck fracture, status post Hip pinning with cannulated screws. DATE OF SURGERY: 1/26/2020. HISTORY OF PRESENT ILLNESS: Jaden Felix 80 y.o.  female  who came in today for one year postoperative visit. The patient denies any significant pain in the left hip. Rates pain a 0/10 VAS and is doing very well. She has been working on ROM and WB using a cane. She has completed physical therapy. She has been living in assisted living facility and is wanting to go home, but she has a lot of steps. No numbness or tingling sensation. No fever or Chills. Past Medical History:   Diagnosis Date    Carpal tunnel syndrome of left wrist     Chronic low back pain     Depression     [place] (hard of hearing)     Hyperlipidemia LDL goal <130     Hypothyroid     Insomnia     Osteoporosis     Urinary incontinence        Past Surgical History:   Procedure Laterality Date    BACK SURGERY      HIP PINNING Left 1/26/2020    LEFT HIP PINNING performed by Gaston Mata MD at [address] [place] [address] History     Socioeconomic History    Marital status:      Spouse name: Not on file    Number of children: 3    Years of education: Not on file    Highest education level: Not on file   Occupational History    Occupation: retired    Social Needs    Financial resource strain: Not on file    Food insecurity     Worry: Not on file     Inability: Not on file   [employer] needs     Medical: Not on file     Non-medical: Not on file   Tobacco Use    Smoking status: Former Smoker     Packs/day: 0.25     Years: 10.00     Pack years: 2.50     Types: Cigarettes    Smokeless tobacco: Never Used   Substance and Sexual Activity    Alcohol use:  Yes     Alcohol/week: 1.0 standard drinks     Types: 1 Glasses of wine per week     Comment: occasional glass of wine or mixed drink    Drug use: No    Sexual activity: Not Currently   Lifestyle    Physical activity Days per week: Not on file     Minutes per session: Not on file    Stress: Not on file   Relationships    Social connections     Talks on phone: Not on file     Gets together: Not on file     Attends Rastafari service: Not on file     Active member of club or organization: Not on file     Attends meetings of clubs or organizations: Not on file     Relationship status: Not on file    Intimate partner violence     Fear of current or ex partner: Not on file     Emotionally abused: Not on file     Physically abused: Not on file     Forced sexual activity: Not on file   Other Topics Concern    Not on file   Social History Narrative    Not on file       Family History   Problem Relation Age of Onset    Heart Disease Father        Current Outpatient Medications on File Prior to Visit   Medication Sig Dispense Refill    sertraline (ZOLOFT) 25 MG tablet Take 25 mg by mouth daily      furosemide (LASIX) 20 MG tablet Take 20 mg by mouth daily       donepezil (ARICEPT) 5 MG tablet Take 1 tablet by mouth nightly (Patient taking differently: Take 10 mg by mouth nightly ) 30 tablet 3    amitriptyline (ELAVIL) 10 MG tablet PRN 90 tablet 2    levothyroxine (SYNTHROID) 88 MCG tablet Take 88 mcg by mouth Daily      KRILL OIL PO Take 1 tablet by mouth daily      lisinopril (PRINIVIL;ZESTRIL) 5 MG tablet Take 0.5 tablets by mouth daily 90 tablet 1    metoprolol tartrate 37.5 MG TABS Take 37.5 mg by mouth 2 times daily 60 tablet 2    simvastatin (ZOCOR) 20 MG tablet TAKE ONE- HALF (1/2) TABLET BY MOUTH ONCE NIGHTLY (Patient taking differently: 10 mg ) 45 tablet 2    vitamin B-12 (CYANOCOBALAMIN) 100 MCG tablet Take 50 mcg by mouth daily      Calcium Carbonate-Vitamin D (CALTRATE 600+D PO) Take 1 tablet by mouth daily       multivitamin-iron-minerals-folic acid (CENTRUM) chewable tablet Take 1 tablet by mouth daily      aspirin 325 MG EC tablet Take 1 tablet by mouth daily 30 tablet 0  zoledronic acid (RECLAST) 5 MG/100ML SOLN Infuse 100 mLs intravenously once for 1 dose 100 mL 0    oxybutynin (DITROPAN) 5 MG tablet TAKE ONE TABLET BY MOUTH TWICE A DAY (Patient not taking: Reported on 1/15/2021) 180 tablet 1     No current facility-administered medications on file prior to visit. Pertinent items are noted in HPI  Review of systems reviewed from Patient History Form dated on 2/11/2020 and available in the patient's chart under the Media tab. No change noted. PHYSICAL EXAMINATION:  Ms. Jose A Nava is a very pleasant 80 y.o.  female who presents today in no acute distress, awake, alert, and oriented. She is well dressed, nourished and  groomed. Patient with normal affect. Height is  5' 5\" (1.651 m), weight is 98 lb (44.5 kg), Body mass index is 16.31 kg/m². Resting respiratory rate is 16. The patient walks with slow gait, using a cane. The incision is healed well, left hip. No signs of any erythema or drainage. She has no pain with the active or passive range of motion of the left  hip. She has intact sensation, distally, and  is neurovascularly intact. Ankle reflex 1+ bilaterally. Good strength, and no instability both upper and lower extremities. IMAGING:  Two views left hip and femur, and AP pelvis taken today in the office showed good alignment of the impacted femoral neck fracture, 3 cannulated screws in good position, no loosening, or hardware failure. No AVN. IMPRESSION:  One year out from left Hip pinning with cannulated screws, and doing very well. PLAN:  I have told the patient to work on ROM,  WBAT, as well as strengthening exercises that were given to her by PT. I believe that she would benefit from a cane for gait stability. The patient will come back for a follow up PRN. As this patient has demonstrated risk factors for osteoporosis, such as age greater than [de-identified] years and evidence of a fracture, I have referred the patient back to the primary care physician for evaluation for osteoporosis, including consideration for DEXA scanning, if this is felt to be clinically indicated. The patient is advised to contact the primary care physician to follow-up for further evaluation.        Alexandra Wilson MD

## 2021-01-25 NOTE — LETTER
[address] [facility] [phone]  Samy Thomas 28[address] [facility]    Pacemaker/Defibrillator Clinic          01/25/21        Sandra Felix  [address]an [address] [place] [address] [address]  [facility] [address]        Dear Sandra Felix    This letter is to inform you that we received the transmission from your monitor at home that checks your implanted heart device. The next date your monitor will automatically transmit will be 3-1-21. If your report needs attention we will notify you. Your device and monitor are wireless and most transmit cellularly, but please periodically check your monitor is still plugged in to the electrical outlet. If you still use the telephone land line to send please ensure the connection to the phone david is secure. This will help to ensure successful automatic transmissions in the future. Also, the monitor needs to be close to you while sleeping at night. Please be aware that the remote device transmission sites are periodically monitored only during regular business hours during which simultaneous in-office device clinics are being run. If your transmission requires attention, we will contact you as soon as possible. Thank you.             Fabian

## 2021-01-25 NOTE — PROGRESS NOTES
We received a remote transmission from patient's monitor at home. Remote Linq report shows no arrhythmias. Pauses are not real. Noted under sensing. EP physician to review. We will continue to monitor remotely.

## 2021-03-01 NOTE — LETTER
[address] [facility] [phone]  [place] [facility]    Pacemaker/Defibrillator Clinic          03/01/21        Leonardo Felix  [address] [place] [address] [address]  [facility] [address]        Dear Leonardo Felix    This letter is to inform you that we received the transmission from your monitor at home that checks your implanted heart device. The next date your monitor will automatically transmit will be 4-5-21. If your report needs attention we will notify you. Your device and monitor are wireless and most transmit cellularly, but please periodically check your monitor is still plugged in to the electrical outlet. If you still use the telephone land line to send please ensure the connection to the phone david is secure. This will help to ensure successful automatic transmissions in the future. Also, the monitor needs to be close to you while sleeping at night. Please be aware that the remote device transmission sites are periodically monitored only during regular business hours during which simultaneous in-office device clinics are being run. If your transmission requires attention, we will contact you as soon as possible. Thank you.             [place] 81

## 2021-04-05 NOTE — PROGRESS NOTES
We received a remote transmission from patient's monitor at home. Remote Linq report shows no arrhythmias. Tachy and pauses are not real. Noted under and over sensing. EP physician to review. We will continue to monitor remotely.

## 2021-05-13 NOTE — LETTER
[address] [facility] [phone]  [place]- [address] [facility]    Pacemaker/Defibrillator Clinic    05/13/21      Vivek Felix  [address] [place] [address] [address]  [facility] [address]      Dear Vivek Felix    This letter is to inform you that we received the transmission from your monitor at home that checks your implanted heart device. The next date your monitor will automatically transmit will be 6-14-21. If your report needs attention we will notify you. Your device and monitor are wireless and most transmit cellularly, but please periodically check your monitor is still plugged in to the electrical outlet. If you still use the telephone land line to send please ensure the connection to the phone david is secure. This will help to ensure successful automatic transmissions in the future. Also, the monitor needs to be close to you while sleeping at night. Please be aware that the remote device transmission sites are periodically monitored only during regular business hours during which simultaneous in-office device clinics are being run. If your transmission requires attention, we will contact you as soon as possible. **PLEASE NOTE** that our [facility] policy and processes are changing to ensure a more seamless approach for all parties involved, allowing more time for our nurses to address patient issues and concerns. We will no longer be sending letters for NORMAL remote transmissions. You will be contacted by phone if your transmission requires attention (as previously done), and letters will only be sent regarding monitor disconnections or missed transmissions if you are unable to be reached by phone. Please do not be alarmed by this new process, as we will continue to contact you if your transmission report requires attention. This will be your final \"remote received\" letter.   From this point forward, the [facility] will be utilizing the no news is good news approach. As always, please feel free to contact your nurse with any questions or concerns. Thank you.     [facility]

## 2021-05-13 NOTE — PROGRESS NOTES
We received a remote transmission from patient's monitor at home. Remote Linq report shows no arrhythmias. Noted under and over sensing. Pauses and tachy recordings are not real. EP physician to review. We will continue to monitor remotely.

## 2021-09-14 PROBLEM — Z20.822 SUSPECTED COVID-19 VIRUS INFECTION: Status: ACTIVE | Noted: 2021-01-01

## 2021-09-14 PROBLEM — N17.9 AKI (ACUTE KIDNEY INJURY) (HCC): Status: ACTIVE | Noted: 2021-01-01

## 2021-09-14 PROBLEM — J96.01 ACUTE RESPIRATORY FAILURE WITH HYPOXEMIA (HCC): Status: ACTIVE | Noted: 2021-01-01

## 2021-09-14 NOTE — ED PROVIDER NOTES
EKG  The Ekg interpreted by me in the absence of a cardiologist shows. normal sinus rhythm with a rate of 60  Axis is   Left axis deviation  QTc is  normal  Intervals and Durations are unremarkable. No specific ST-T wave changes appreciated. No evidence of acute ischemia. No significant change from prior EKG dated 1/28/20      I only performed EKG interpretation and was not otherwise involved in the care of this patient.            Megha Frias MD  09/14/21 0315

## 2021-09-14 NOTE — ED PROVIDER NOTES
Depression     [place] (hard of hearing)     Hyperlipidemia LDL goal <130     Hypothyroid     Insomnia     Osteoporosis     Urinary incontinence          Procedure Laterality Date    BACK SURGERY      HIP PINNING Left 1/26/2020    LEFT HIP PINNING performed by Salazar Haro MD at [facility]       Medications:  Previous Medications    AMITRIPTYLINE (ELAVIL) 10 MG TABLET    PRN    ASPIRIN 325 MG EC TABLET    Take 1 tablet by mouth daily    CALCIUM CARBONATE-VITAMIN D (CALTRATE 600+D PO)    Take 1 tablet by mouth daily     DONEPEZIL (ARICEPT) 5 MG TABLET    Take 1 tablet by mouth nightly    FUROSEMIDE (LASIX) 20 MG TABLET    Take 20 mg by mouth daily     KRILL OIL PO    Take 1 tablet by mouth daily    LEVOTHYROXINE (SYNTHROID) 88 MCG TABLET    Take 88 mcg by mouth Daily    LISINOPRIL (PRINIVIL;ZESTRIL) 5 MG TABLET    Take 0.5 tablets by mouth daily    METOPROLOL TARTRATE 37.5 MG TABS    Take 37.5 mg by mouth 2 times daily    MULTIVITAMIN-IRON-MINERALS-FOLIC ACID (CENTRUM) CHEWABLE TABLET    Take 1 tablet by mouth daily    OXYBUTYNIN (DITROPAN) 5 MG TABLET    TAKE ONE TABLET BY MOUTH TWICE A DAY    SERTRALINE (ZOLOFT) 25 MG TABLET    Take 25 mg by mouth daily    SIMVASTATIN (ZOCOR) 20 MG TABLET    TAKE ONE- HALF (1/2) TABLET BY MOUTH ONCE NIGHTLY    VITAMIN B-12 (CYANOCOBALAMIN) 100 MCG TABLET    Take 50 mcg by mouth daily    ZOLEDRONIC ACID (RECLAST) 5 MG/100ML SOLN    Infuse 100 mLs intravenously once for 1 dose         Review of Systems:  Review of Systems   Constitutional: Positive for fatigue. Negative for chills, diaphoresis and fever. Respiratory: Positive for cough and shortness of breath. Cardiovascular: Positive for chest pain (left sided, pleuritic, non radiating). Negative for palpitations and leg swelling. Gastrointestinal: Negative for abdominal pain, constipation, diarrhea, nausea and vomiting.    Genitourinary: Negative for decreased urine volume, difficulty urinating, dysuria, frequency and urgency. Musculoskeletal: Negative for back pain and neck pain. Neurological: Negative for weakness. All other systems reviewed and are negative. Positives and Pertinent negatives as per HPI. Except as noted above in the ROS, all other systems were reviewed/completed and are negative. Physical Exam:  Physical Exam  Vitals and nursing note reviewed. Constitutional:       Appearance: Normal appearance. She is well-developed. She is not toxic-appearing or diaphoretic. HENT:      Head: Normocephalic. Right Ear: External ear normal.      Left Ear: External ear normal.      Nose: Nose normal.   Eyes:      General:         Right eye: No discharge. Left eye: No discharge. Conjunctiva/sclera: Conjunctivae normal.   Cardiovascular:      Rate and Rhythm: Normal rate and regular rhythm. Heart sounds: Normal heart sounds. No murmur heard. No friction rub. No gallop. Pulmonary:      Effort: Pulmonary effort is normal. No respiratory distress. Breath sounds: Normal breath sounds. No wheezing or rales. Abdominal:      General: Abdomen is flat. Bowel sounds are normal. There is no distension. Palpations: Abdomen is soft. There is no mass. Tenderness: There is no abdominal tenderness. There is no guarding. Musculoskeletal:         General: Normal range of motion. Cervical back: Normal range of motion and neck supple. Skin:     General: Skin is warm and dry. Coloration: Skin is not pale. Neurological:      Mental Status: She is alert and oriented to person, place, and time. Psychiatric:         Behavior: Behavior normal. Behavior is cooperative.        MEDICAL DECISION MAKING    Vitals:    Vitals:    09/14/21 0927 09/14/21 0930 09/14/21 0948 09/14/21 1030   BP: (!) 161/59 (!) 166/64 (!) 171/92 (!) 117/56   Pulse: 67   63   Resp: 18   24   Temp: 98.1 °F (36.7 °C)      SpO2: 99% 98% 94% 98%   Weight: 100 lb (45.4 kg)          LABS:   Labs Reviewed   CBC WITH returned at the time of this note. RADIOLOGY:   Non-plain film images suchas CT, Ultrasound and MRI are read by the radiologist. Rhonda EDWARDS PA have directly visualized the radiologic plain film image(s) with the below findings:    Interpretation per the Radiologist below, if available at the time of this note:    CT CHEST PULMONARY EMBOLISM W CONTRAST   Final Result   Negative for acute pulmonary embolism      Left pleural effusion with left lower lobe opacity that could represent   pneumonia. XR CHEST PORTABLE   Final Result   Left basilar atelectasis, pleural effusion or airspace disease. Left perihilar airspace disease versus pulmonary vascular congestion. XR CHEST PORTABLE    Result Date: 9/14/2021  EXAMINATION: ONE XRAY VIEW OF THE CHEST 9/14/2021 9:49 am COMPARISON: Chest x-ray dated 01/25/2020. HISTORY: ORDERING SYSTEM PROVIDED HISTORY: chest pain TECHNOLOGIST PROVIDED HISTORY: Reason for exam:->chest pain Reason for Exam: Chest Pain (Arrived by [facility] from [address] [place] that began last night. Denies other complaints. ASA given en route. Supplemental O2 provided in triage; Sat 99% on 2L. ) Acuity: Acute Type of Exam: Initial FINDINGS: HEART/MEDIASTINUM: The cardiomediastinal silhouette is stable. PLEURA/LUNGS: There are increased interstitial markings, which are chronic. There is however increased opacity in the left perihilar region which may reflect pulmonary vascular congestion versus an atypical pneumonia. Left basilar opacification reflecting atelectasis, effusion or airspace disease. There is no appreciable pneumothorax. BONES/SOFT TISSUE: No acute abnormality. Left basilar atelectasis, pleural effusion or airspace disease. Left perihilar airspace disease versus pulmonary vascular congestion.      CT CHEST PULMONARY EMBOLISM W CONTRAST    Result Date: 9/14/2021  EXAMINATION: CTA OF THE CHEST 9/14/2021 11:12 am TECHNIQUE: CTA of the chest was performed after the administration of intravenous contrast.  Multiplanar reformatted images are provided for review. MIP images are provided for review. Dose modulation, iterative reconstruction, and/or weight based adjustment of the mA/kV was utilized to reduce the radiation dose to as low as reasonably achievable. COMPARISON: 10/02/2018, chest radiograph done today HISTORY: ORDERING SYSTEM PROVIDED HISTORY: shortness of breath, chest pain, elevated dimer TECHNOLOGIST PROVIDED HISTORY: Reason for exam:->shortness of breath, chest pain, elevated dimer Decision Support Exception - unselect if not a suspected or confirmed emergency medical condition->Emergency Medical Condition (MA) Reason for Exam: SOB, CP, elevated D dimer Acuity: Unknown Type of Exam: Unknown FINDINGS: Pulmonary Arteries: Pulmonary arteries are adequately opacified for evaluation. No evidence of intraluminal filling defect to suggest pulmonary embolism. Main pulmonary artery is normal in caliber. Mediastinum: No evidence of mediastinal lymphadenopathy. The heart and pericardium demonstrate no acute abnormality. There is no acute abnormality of the thoracic aorta. Lungs/pleura: Left pleural effusion. Left lower lobe consolidation. Central airways are patent. No acute airspace disease in the right lung. Upper Abdomen: Limited images of the upper abdomen are unremarkable. Soft Tissues/Bones: Numerous compression deformities again seen throughout the thoracic spine similar to previous exam done 2018. Negative for acute pulmonary embolism Left pleural effusion with left lower lobe opacity that could represent pneumonia.         MEDICAL DECISION MAKING / ED COURSE:      PROCEDURES:   Procedures    Patient was given:  Medications   azithromycin (ZITHROMAX) 500 mg in D5W 250ml Vial Mate (500 mg IntraVENous New Bag 9/14/21 1250)     And   cefTRIAXone (ROCEPHIN) 1000 mg in sterile water 10 mL IV syringe (1,000 mg IntraVENous Given 9/14/21 1246) lower lobe due to infectious organism    2. YAYO (acute kidney injury) ([facility] [address]    3. Chest pain, unspecified type        DISPOSITION Decision To Admit 09/14/2021 12:08:42 PM      PATIENT REFERRED TO:  No follow-up provider specified.     DISCHARGE MEDICATIONS:  New Prescriptions    No medications on file              (Please note the MDM and HPI sections of this note were completed with avoice recognition program.  Efforts were made to edit the dictations but occasionally words are mis-transcribed.)    Electronically signed, MANDA Bolaños,             MANDA Merino  09/14/21 0587

## 2021-09-14 NOTE — ED NOTES
Bed: 26  Expected date:   Expected time:   Means of arrival:   Comments:  [facility]-chest pain     Veryl Safe, RN  09/14/21 2759

## 2021-09-14 NOTE — ED NOTES
Arrived by [facility]  [facility] from [address] [place] that began at an unknown last night. Denies other complaints. ASA given en route. Does not wear home O2, but supplemental O2 provided in triage; Sat 99% on 2L.   Monitors in place     [facility]  09/14/21 1001

## 2021-09-14 NOTE — H&P
History and Physical  Dr. Divina Cruz  9/14/2021    PCP: No primary care provider on file. Cc:   Chief Complaint   Patient presents with    Chest Pain     Arrived by [facility] [place] [facility] from [address] CP that began last night. Denies other complaints. ASA given en route. Supplemental O2 provided in triage; Sat 99% on 2L. HPI:  Star Felix is a 80 y.o. female who has a past medical history of Carpal tunnel syndrome of left wrist, Chronic low back pain, Depression, [place] (hard of hearing), Hyperlipidemia LDL goal <130, Hypothyroid, Insomnia, Osteoporosis, and Urinary incontinence. Patient presents with Pneumonia. HPI     80 y.o. female who presents to the emergency department from [facility] with complaints of chest pain that started last night. Patient also complaining of feeling very fatigued and generalized weakness, states that normally by this time of day she would be up and perky. Denies any urinary frequency, urgency, dysuria or hematuria. Daughter who is at bedside reports that yesterday patient did have a mild cough, she is vaccinated for COVID-19. She does reside at [facility]. She cannot really describe the pain and the pain does not radiate but she states that the pain is 5 out of 10 in severity. Apparently she is not on any home oxygen but was at 88% upon squad arrival.  She was placed on 4 L of oxygen initially, currently at 2 L of oxygen and 97%. She does have slight increase in respirations. She denies any associated diaphoresis or left arm pain, numbness, tingling or weakness    Imaging in the er is reviewed on computer  Impression:        Negative for acute pulmonary embolism     Left pleural effusion with left lower lobe opacity that could represent   pneumonia. Pt does come from conjugate living arrangement  covid swab sent in ER  Results pending at time of admissions    Pt placed on empiric iv abx    Problem list of hospitalization thus far:   Active Hospital Problems    Diagnosis     Suspected COVID-19 virus infection [Z20.822]     Essential hypertension [I10]     Hyponatremia [E87.1]     Pneumonia [J18.9]     Hyperlipidemia LDL goal <130 [E78.5]     Hypothyroid [E03.9]          Review of Systems: (1 system for EPF, 2-9 for detailed, 10+ for comprehensive)  Review of Systems   Constitutional: Positive for fatigue. Negative for chills and fever. HENT: Negative for ear discharge and ear pain. Eyes: Negative for pain and redness. Respiratory: Positive for cough and shortness of breath. Cardiovascular: Positive for chest pain. Negative for leg swelling. Gastrointestinal: Negative for nausea and vomiting. Endocrine: Negative for polydipsia and polyphagia. Genitourinary: Negative for frequency and urgency. Musculoskeletal: Negative for back pain and myalgias. Skin: Negative for rash. Allergic/Immunologic: Negative for food allergies. Neurological: Positive for weakness. Negative for dizziness. Hematological: Does not bruise/bleed easily. Psychiatric/Behavioral: Negative for agitation and dysphoric mood.            Past Medical History:   Past Medical History:   Diagnosis Date    Carpal tunnel syndrome of left wrist     Chronic low back pain     Depression     [place] (hard of hearing)     Hyperlipidemia LDL goal <130     Hypothyroid     Insomnia     Osteoporosis     Urinary incontinence        Past Surgical History:   Past Surgical History:   Procedure Laterality Date    BACK SURGERY      HIP PINNING Left 1/26/2020    LEFT HIP PINNING performed by Teri Alcazar MD at [address] [place] [address] [facility]:   Social History     Tobacco History     Smoking Status  Former Smoker Smoking Frequency  0.25 packs/day for 10 years (2.5 pk yrs) Smoking Tobacco Type  Cigarettes    Smokeless Tobacco Use  Never Used          Alcohol History     Alcohol Use Status  Yes Drinks/Week  1 Glasses of wine per week Amount  1.0 standard drinks of alcohol/wk Comment  occasional glass of wine or mixed drink          Drug Use     Drug Use Status  No          Sexual Activity     Sexually Active  Not Currently                Fam History:   Family History   Problem Relation Age of Onset    Heart Disease Father        PFSH: The above PMHx, PSHx, SocHx, FamHx has been reviewed by myself. (1 area for detailed, 2-3 for comprehensive)      Code Status: Prior    Meds - following list of home medications fromelectronic chart has been reviewed by myself  Prior to Admission medications    Medication Sig Start Date End Date Taking?  Authorizing Provider   sertraline (ZOLOFT) 25 MG tablet Take 25 mg by mouth daily    Historical Provider, MD   furosemide (LASIX) 20 MG tablet Take 20 mg by mouth daily  4/20/20   Historical Provider, MD   donepezil (ARICEPT) 5 MG tablet Take 1 tablet by mouth nightly  Patient taking differently: Take 10 mg by mouth nightly  1/28/20   Gavin Bahena MD   aspirin 325 MG EC tablet Take 1 tablet by mouth daily 1/28/20 2/27/20  DE Wiggins CNP   amitriptyline (ELAVIL) 10 MG tablet PRN 4/10/19   Nasrin Theodore MD   levothyroxine (SYNTHROID) 88 MCG tablet Take 88 mcg by mouth Daily    Historical Provider, MD   zoledronic acid (RECLAST) 5 MG/100ML SOLN Infuse 100 mLs intravenously once for 1 dose 11/30/18 11/30/18  Haydee De La Rosa MD   KRILL OIL PO Take 1 tablet by mouth daily    Historical Provider, MD   lisinopril (PRINIVIL;ZESTRIL) 5 MG tablet Take 0.5 tablets by mouth daily 11/20/18   DE Newman CNP   metoprolol tartrate 37.5 MG TABS Take 37.5 mg by mouth 2 times daily 10/4/18   Gavin Bahena MD   simvastatin (ZOCOR) 20 MG tablet TAKE ONE- HALF (1/2) TABLET BY MOUTH ONCE NIGHTLY  Patient taking differently: 10 mg  5/11/18   Katie Dow MD   oxybutynin (DITROPAN) 5 MG tablet TAKE ONE TABLET BY MOUTH TWICE A DAY  Patient not taking: Reported on 1/15/2021 2/1/17   Dontae Cotton DO   vitamin B-12 (CYANOCOBALAMIN) 100 MCG tablet Take 50 mcg by mouth daily    Historical Provider, MD   Calcium Carbonate-Vitamin D (CALTRATE 600+D PO) Take 1 tablet by mouth daily     Historical Provider, MD   multivitamin-iron-minerals-folic acid (CENTRUM) chewable tablet Take 1 tablet by mouth daily    Historical Provider, MD         Allergies   Allergen Reactions    Prozac [Fluoxetine Hcl] Nausea Only             EXAM: (2-7 system for EPF/Detailed, ?8 for Comprehensive)  BP (!) 117/56   Pulse 63   Temp 98.1 °F (36.7 °C)   Resp 24   Wt 100 lb (45.4 kg)   SpO2 98%   BMI 16.64 kg/m²   Constitutional: vitals as above: alert, appears stated age and cooperative  Psychiatric: normal insight and judgment, oriented to person, place, time, and general circumstances  Head: Normocephalic, without obvious abnormality, atraumatic  Eyes:lids and lashes normal, conjunctivae and sclerae normal and pupils equal, round, reactive to light and accomodation  EMNT: external ear normal, lips normal  Neck: no adenopathy, supple, symmetrical, trachea midline and thyroid not enlarged, symmetric, no tenderness/mass/nodules   Respiratory: rales bilat in bases  Cardiovascular: normal rate, regular rhythm, normal S1 and S2 and no gallops  Gastrointestinal: soft, non-tender, non-distended, normal bowel sounds, no masses or organomegaly  Lymphatic:   Extremities: no edema, no clubbing  Skin:No rashes or nodules noted.   Neurologic:    LABS:  Labs Reviewed   CBC WITH AUTO DIFFERENTIAL - Abnormal; Notable for the following components:       Result Value    WBC 13.8 (*)     RBC 3.20 (*)     Hemoglobin 10.1 (*)     Hematocrit 30.2 (*)     Neutrophils Absolute 12.0 (*)     Lymphocytes Absolute 0.9 (*)     All other components within normal limits    Narrative:     Performed at:  [facility] [address], [place], [address]   Phone [phone]   COMPREHENSIVE METABOLIC PANEL W/ REFLEX TO MG FOR LOW K - Abnormal; Notable for the following FINDINGS: HEART/MEDIASTINUM: The cardiomediastinal silhouette is stable. PLEURA/LUNGS: There are increased interstitial markings, which are chronic. There is however increased opacity in the left perihilar region which may reflect pulmonary vascular congestion versus an atypical pneumonia. Left basilar opacification reflecting atelectasis, effusion or airspace disease. There is no appreciable pneumothorax. BONES/SOFT TISSUE: No acute abnormality. Left basilar atelectasis, pleural effusion or airspace disease. Left perihilar airspace disease versus pulmonary vascular congestion. CT CHEST PULMONARY EMBOLISM W CONTRAST    Result Date: 9/14/2021  EXAMINATION: CTA OF THE CHEST 9/14/2021 11:12 am TECHNIQUE: CTA of the chest was performed after the administration of intravenous contrast.  Multiplanar reformatted images are provided for review. MIP images are provided for review. Dose modulation, iterative reconstruction, and/or weight based adjustment of the mA/kV was utilized to reduce the radiation dose to as low as reasonably achievable. COMPARISON: 10/02/2018, chest radiograph done today HISTORY: ORDERING SYSTEM PROVIDED HISTORY: shortness of breath, chest pain, elevated dimer TECHNOLOGIST PROVIDED HISTORY: Reason for exam:->shortness of breath, chest pain, elevated dimer Decision Support Exception - unselect if not a suspected or confirmed emergency medical condition->Emergency Medical Condition (MA) Reason for Exam: SOB, CP, elevated D dimer Acuity: Unknown Type of Exam: Unknown FINDINGS: Pulmonary Arteries: Pulmonary arteries are adequately opacified for evaluation. No evidence of intraluminal filling defect to suggest pulmonary embolism. Main pulmonary artery is normal in caliber. Mediastinum: No evidence of mediastinal lymphadenopathy. The heart and pericardium demonstrate no acute abnormality. There is no acute abnormality of the thoracic aorta. Lungs/pleura: Left pleural effusion.   Left lower lobe consolidation. Central airways are patent. No acute airspace disease in the right lung. Upper Abdomen: Limited images of the upper abdomen are unremarkable. Soft Tissues/Bones: Numerous compression deformities again seen throughout the thoracic spine similar to previous exam done 2018. Negative for acute pulmonary embolism Left pleural effusion with left lower lobe opacity that could represent pneumonia. EKG: from ER, interpreted by self, normal sinus rhythm at 60. No acute st elevation noted. No twi seen. Comparison made to ekg in old chart dated 1/28/20, shows similar form, but rate slightly higher at [address]:    Principal Problem:    Pneumonia -New Problem to me. Pt with PNA. Unclear if bacterial, viral  Plan: admit, empiric iv abx. hhn o2 to be given. Pt placed in droplet plus isolation as we await covid test result  Active Problems:    Hypothyroid -Established problem. Stable. Plan: cont on synthroid    Hyperlipidemia LDL goal <130 -Established problem. Stable. Plan: Continue present orders/plan. Hyponatremia -New Problem to me. Na 128 on admit  Plan: gentle ivf, repeat labs in am    Essential hypertension -Established problem. Stable. 117/56 on admit  Plan: Pt home BP meds reviewed and will be continued. IV Hydralazine ordered for control of extremely high blood pressures   Will monitor labs to assess Creat/K for possible complications of medications. Suspected COVID-19 virus infection -New Problem to me. Plan: pt with suspected covid infection. Test sent in ER. Pt placed in droplet plus isolation          Diagnoses as listed above, designated as new or established and plan outlined for each. Data Reviewed:   (1) Lab tests were reviewed or ordered. (1) Radiology tests were reviewed or ordered. (1) Medical test (Echo, EKG, PFT/debby) were ordered.   (1)History was not obtained from someone other than patient  (1) Old records  were reviewed - see HPI/MDM for pertinent details if review done. (2) Case wasdiscussed with another health care provider: Rhonda HOLMAN  (2) Imaging was viewed by myself. (2) EKG  was viewed by myself. The patient isbeing placed in inpatient status with the expectation of requiring a hospital stay spanning at least two midnights for care and treatment of the problems noted in the problem list.  This determination is also based on thepatients comorbidities and past medical history, the severity and timing of the signs and symptoms upon presentation.         Electronically signed by: Shiraz Ngo MD 9/14/2021

## 2021-09-15 NOTE — PROGRESS NOTES
Progress Note - Dr. Misha Locke - Internal Medicine  PCP: No primary care provider on file. No primary physician on file. None    Hospital Day: 1  Code Status: DNR-CC  Current Diet: ADULT DIET; Regular        CC: follow up on medical issues    Subjective:   Zahra Felix is a 80 y.o. female. She denies new problems    Maybe a little better  Down to 2L o2  covid test negative    procal nearly 2 - so appears to be bacterial pna    She denies chest pain, complains of shortness of breath, denies nausea,  denies emesis. 10 system Review of Systems is reviewed with patient, and pertinent positives are noted in HPI above . Otherwise, Review of systems is negative. I have reviewed the patient's medical and social history in detail and updated the computerized patient record. To recap: She  has a past medical history of Carpal tunnel syndrome of left wrist, Chronic low back pain, Depression, [place] (hard of hearing), Hyperlipidemia LDL goal <130, Hypothyroid, Insomnia, Osteoporosis, and Urinary incontinence. . She  has a past surgical history that includes back surgery and hip pinning (Left, 1/26/2020). . She  reports that she has quit smoking. Her smoking use included cigarettes. She has a 2.50 pack-year smoking history. She has never used smokeless tobacco. She reports current alcohol use of about 1.0 standard drinks of alcohol per week. She reports that she does not use drugs. .        Active Hospital Problems    Diagnosis Date Noted    Suspected COVID-19 virus infection [Z20.822] 09/14/2021    [facility] [N17.9] 09/14/2021    Acute respiratory failure with hypoxemia ([facility] [J96.01] 09/14/2021    Essential hypertension [I10] 12/18/2018    Hyponatremia [E87.1] 10/02/2018    Pneumonia [J18.9] 10/02/2018    Hyperlipidemia LDL goal <130 [E78.5]     Hypothyroid [E03.9]        Current Facility-Administered Medications: vitamin B-12 (CYANOCOBALAMIN) tablet 50 mcg, 50 mcg, Oral, Daily  multivitamin 1 tablet, 1 tablet, Oral, Daily  oxybutynin (DITROPAN) tablet 5 mg, 5 mg, Oral, Nightly  lisinopril (PRINIVIL;ZESTRIL) tablet 2.5 mg, 2.5 mg, Oral, Daily  levothyroxine (SYNTHROID) tablet 50 mcg, 50 mcg, Oral, QAM AC  donepezil (ARICEPT) tablet 10 mg, 10 mg, Oral, Nightly  furosemide (LASIX) tablet 20 mg, 20 mg, Oral, Daily  sertraline (ZOLOFT) tablet 100 mg, 100 mg, Oral, Daily  0.9 % sodium chloride infusion, , IntraVENous, Continuous  sodium chloride flush 0.9 % injection 5-40 mL, 5-40 mL, IntraVENous, 2 times per day  sodium chloride flush 0.9 % injection 10 mL, 10 mL, IntraVENous, PRN  0.9 % sodium chloride infusion, 25 mL, IntraVENous, PRN  ondansetron (ZOFRAN-ODT) disintegrating tablet 4 mg, 4 mg, Oral, Q8H PRN **OR** ondansetron (ZOFRAN) injection 4 mg, 4 mg, IntraVENous, Q6H PRN  magnesium hydroxide (MILK OF MAGNESIA) 400 MG/5ML suspension 30 mL, 30 mL, Oral, Daily PRN  acetaminophen (TYLENOL) tablet 650 mg, 650 mg, Oral, Q6H PRN **OR** acetaminophen (TYLENOL) suppository 650 mg, 650 mg, Rectal, Q6H PRN  cefTRIAXone (ROCEPHIN) 1000 mg in sterile water 10 mL IV syringe, 1,000 mg, IntraVENous, Q24H **AND** azithromycin (ZITHROMAX) tablet 500 mg, 500 mg, Oral, Q24H  hydrALAZINE (APRESOLINE) injection 10 mg, 10 mg, IntraVENous, Q6H PRN  0.9 % sodium chloride bolus, 500 mL, IntraVENous, PRN  heparin (porcine) injection 5,000 Units, 5,000 Units, SubCUTAneous, 3 times per day  albuterol sulfate  (90 Base) MCG/ACT inhaler 2 puff, 2 puff, Inhalation, 4x daily  ipratropium (ATROVENT HFA) 17 MCG/ACT inhaler 2 puff, 2 puff, Inhalation, 4x daily  atorvastatin (LIPITOR) tablet 10 mg, 10 mg, Oral, Nightly  metoprolol tartrate (LOPRESSOR) tablet 12.5 mg, 12.5 mg, Oral, BID         Objective:  BP (!) 141/61   Pulse 63   Temp 98.1 °F (36.7 °C) (Oral)   Resp 19   Ht 5' 6\" (1.676 m)   Wt 101 lb 3.1 oz (45.9 kg)   SpO2 96%   BMI 16.33 kg/m²      Patient Vitals for the past 24 hrs:   BP Temp Temp src Pulse Resp SpO2 Height Weight   09/15/21 0822 (!) 141/61 98.1 °F (36.7 °C) Oral 63 19 96 % -- --   09/15/21 0533 (!) 102/50 99.3 °F (37.4 °C) Oral 63 18 96 % -- --   09/15/21 0109 (!) 106/59 98.3 °F (36.8 °C) Oral 72 18 95 % -- --   09/14/21 2135 -- -- -- -- 18 95 % -- --   09/14/21 2116 (!) 139/54 99.5 °F (37.5 °C) Oral 85 20 92 % -- --   09/14/21 1602 137/66 98.2 °F (36.8 °C) Oral 71 20 -- 5' 6\" (1.676 m) 101 lb 3.1 oz (45.9 kg)   09/14/21 1500 137/69 -- -- 75 (!) 33 -- -- --   09/14/21 1445 107/78 -- -- 73 27 -- -- --   09/14/21 1430 81/64 -- -- 73 29 97 % -- --   09/14/21 1415 (!) 77/67 -- -- 72 23 97 % -- --   09/14/21 1400 113/73 -- -- 73 25 96 % -- --   09/14/21 1345 (!) 115/59 -- -- 73 (!) 31 97 % -- --   09/14/21 1330 (!) 123/56 -- -- 70 26 97 % -- --   09/14/21 1315 (!) 122/54 -- -- 69 24 97 % -- --   09/14/21 1300 116/60 -- -- 69 19 96 % -- --   09/14/21 1245 112/66 -- -- 65 23 96 % -- --   09/14/21 1230 (!) 117/51 -- -- 64 28 96 % -- --   09/14/21 1215 (!) 115/48 -- -- 63 26 96 % -- --   09/14/21 1200 (!) 118/52 -- -- 64 26 96 % -- --   09/14/21 1030 (!) 117/56 -- -- 63 24 98 % -- --   09/14/21 0948 (!) 171/92 -- -- -- -- 94 % -- --   09/14/21 0930 (!) 166/64 -- -- -- -- 98 % -- --   09/14/21 0927 (!) 161/59 98.1 °F (36.7 °C) -- 67 18 99 % -- 100 lb (45.4 kg)     Patient Vitals for the past 96 hrs (Last 3 readings):   Weight   09/14/21 1602 101 lb 3.1 oz (45.9 kg)   09/14/21 0927 100 lb (45.4 kg)           Intake/Output Summary (Last 24 hours) at 9/15/2021 0848  Last data filed at 9/14/2021 1815  Gross per 24 hour   Intake 250 ml   Output --   Net 250 ml         Physical Exam:   BP (!) 141/61   Pulse 63   Temp 98.1 °F (36.7 °C) (Oral)   Resp 19   Ht 5' 6\" (1.676 m)   Wt 101 lb 3.1 oz (45.9 kg)   SpO2 96%   BMI 16.33 kg/m²   General appearance: alert, appears stated age and cooperative  Head: Normocephalic, without obvious abnormality, atraumatic  Lungs: rales bilat   Heart: regular rate and rhythm, S1, S2 normal, no murmur, click, rub or gallop  Abdomen: soft, non-tender; bowel sounds normal; no masses,  no organomegaly  Extremities: extremities normal, atraumatic, no cyanosis or edema    Labs:  Lab Results   Component Value Date    WBC 21.6 (H) 09/15/2021    HGB 8.8 (L) 09/15/2021    HCT 25.8 (L) 09/15/2021     09/15/2021    CHOL 152 02/03/2017    TRIG 95 02/03/2017    HDL 57 02/03/2017    ALT 6 (L) 09/15/2021    AST 15 09/15/2021     (L) 09/15/2021    K 4.7 09/15/2021    CL 96 (L) 09/15/2021    CREATININE 1.5 (H) 09/15/2021    BUN 26 (H) 09/15/2021    CO2 23 09/15/2021    TSH 4.62 (H) 10/02/2018    INR 1.04 09/14/2021     Lab Results   Component Value Date    TROPONINI <0.01 09/14/2021       Recent Imaging Results are Reviewed:  XR CHEST PORTABLE    Result Date: 9/14/2021  EXAMINATION: ONE XRAY VIEW OF THE CHEST 9/14/2021 9:49 am COMPARISON: Chest x-ray dated 01/25/2020. HISTORY: ORDERING SYSTEM PROVIDED HISTORY: chest pain TECHNOLOGIST PROVIDED HISTORY: Reason for exam:->chest pain Reason for Exam: Chest Pain (Arrived by [facility] [place] [facility] from [address] CP that began last night. Denies other complaints. ASA given en route. Supplemental O2 provided in triage; Sat 99% on 2L. ) Acuity: Acute Type of Exam: Initial FINDINGS: HEART/MEDIASTINUM: The cardiomediastinal silhouette is stable. PLEURA/LUNGS: There are increased interstitial markings, which are chronic. There is however increased opacity in the left perihilar region which may reflect pulmonary vascular congestion versus an atypical pneumonia. Left basilar opacification reflecting atelectasis, effusion or airspace disease. There is no appreciable pneumothorax. BONES/SOFT TISSUE: No acute abnormality. Left basilar atelectasis, pleural effusion or airspace disease. Left perihilar airspace disease versus pulmonary vascular congestion.      CT CHEST PULMONARY EMBOLISM W CONTRAST    Result Date: 9/14/2021  EXAMINATION: CTA OF THE CHEST 9/14/2021 11:12 am TECHNIQUE: CTA of the chest was performed after the administration of intravenous contrast.  Multiplanar reformatted images are provided for review. MIP images are provided for review. Dose modulation, iterative reconstruction, and/or weight based adjustment of the mA/kV was utilized to reduce the radiation dose to as low as reasonably achievable. COMPARISON: 10/02/2018, chest radiograph done today HISTORY: ORDERING SYSTEM PROVIDED HISTORY: shortness of breath, chest pain, elevated dimer TECHNOLOGIST PROVIDED HISTORY: Reason for exam:->shortness of breath, chest pain, elevated dimer Decision Support Exception - unselect if not a suspected or confirmed emergency medical condition->Emergency Medical Condition (MA) Reason for Exam: SOB, CP, elevated D dimer Acuity: Unknown Type of Exam: Unknown FINDINGS: Pulmonary Arteries: Pulmonary arteries are adequately opacified for evaluation. No evidence of intraluminal filling defect to suggest pulmonary embolism. Main pulmonary artery is normal in caliber. Mediastinum: No evidence of mediastinal lymphadenopathy. The heart and pericardium demonstrate no acute abnormality. There is no acute abnormality of the thoracic aorta. Lungs/pleura: Left pleural effusion. Left lower lobe consolidation. Central airways are patent. No acute airspace disease in the right lung. Upper Abdomen: Limited images of the upper abdomen are unremarkable. Soft Tissues/Bones: Numerous compression deformities again seen throughout the thoracic spine similar to previous exam done 2018. Negative for acute pulmonary embolism Left pleural effusion with left lower lobe opacity that could represent pneumonia. Assessment and Plan:  Principal Problem:    Pneumonia -Established problem. Stable. Pt with elevated wbc, elevated procal. covid ruled out. Plan: cont rocephin/zmax - take out of droplet plus isolation. Active Problems:    Hypothyroid -Established problem. Stable.     Plan: cont on synthroid    Hyperlipidemia LDL goal <130 -Established problem. Stable. Plan: Continue present orders/plan. Hyponatremia -Established problem. Improving. 130  Plan: cont fluids. Essential hypertension -Established problem. Stable. 141/61  Plan: stay on same meds    Suspected COVID-19 virus infection -Established problem, now resolved. covid swab neg  Plan: take out of droplet plus isolation    YAYO (acute kidney injury) ([facility] -Established problem. Stable. Creat 1.5  Plan: Continue present orders/plan. Acute respiratory failure with hypoxemia ([address] -Established problem. Improving. Down to 2L  Plan: cont to tx pna.             Rangel Kerr MD  9/15/2021

## 2021-09-15 NOTE — PROGRESS NOTES
Physical Therapy    Facility/Department: [address] ONCOLOGY  Initial Assessment    NAME: Abraham Felix  : 1931  MRN: [id number]    Date of Service: 9/15/2021    Discharge Recommendations:Chyna Felix scored a 13/24 on the AM-PAC short mobility form. Current research shows that an AM-PAC score of 17 or less is typically not associated with a discharge to the patient's home setting. Based on the patient's AM-PAC score and their current functional mobility deficits, it is recommended that the patient have 3-5 sessions per week of Physical Therapy at d/c to increase the patient's independence. Please see assessment section for further patient specific details. If patient discharges prior to next session this note will serve as a discharge summary. Please see below for the latest assessment towards goals. Subacute/Skilled Nursing Facility   PT Equipment Recommendations  Equipment Needed: No    Assessment   Body structures, Functions, Activity limitations: Decreased functional mobility ; Decreased ADL status; Decreased strength;Decreased safe awareness;Decreased cognition;Decreased endurance;Decreased balance; Increased pain;Decreased posture  Assessment: Pt is limited by the above deficits and would benefit from skilled PT services to maximize pt functional mobility and safety prior to discharge. Pt is requiring min A for transfers, mod A for bed mobility, dependent for pericare and hygeine. Recommend discharge to SNF for further therapy.   Treatment Diagnosis: impaired functional mobility  Prognosis: Fair;Good  Decision Making: Medium Complexity  PT Education: PT Role;Goals;Plan of Care;General Safety  Patient Education: Pt will require further follow up education due to poor cognition  Barriers to Learning: cognitive, hearing  REQUIRES PT FOLLOW UP: Yes  Activity Tolerance  Activity Tolerance: Patient limited by fatigue;Patient limited by pain       Patient Diagnosis(es): The primary encounter diagnosis was Pneumonia of left lower lobe due to infectious organism. Diagnoses of YAYO (acute kidney injury) ([address] and Chest pain, unspecified type were also pertinent to this visit. has a past medical history of Carpal tunnel syndrome of left wrist, Chronic low back pain, Depression, [place] (hard of hearing), Hyperlipidemia LDL goal <130, Hypothyroid, Insomnia, Osteoporosis, and Urinary incontinence. has a past surgical history that includes back surgery and hip pinning (Left, 1/26/2020). Restrictions  Restrictions/Precautions  Restrictions/Precautions: Fall Risk   Position Activity Restriction  Other position/activity restrictions: This is a  80 y.o. female who presents to the emergency department from [facility] with complaints of chest pain that started last night. Patient also complaining of feeling very fatigued and generalized weakness  Vision/Hearing  Vision: Impaired  Vision Exceptions: Wears glasses for reading  Hearing: Exceptions to [facility]  Hearing Exceptions: Hard of hearing/hearing concerns; No hearing aid     Subjective  General  Chart Reviewed: Yes  Family / Caregiver Present: Yes (daughter, Angela Grant)  Diagnosis: YAYO  Follows Commands: Within Functional Limits  Other (Comment): pt is [place]  General Comment  Comments: Pt supine in bed upon arrival; agreeable to PT/OT  Subjective  Subjective: Pt reports back pain and buttocks pain 8/10, nursing aware; waffle cushion placed in chair  Pain Screening  Patient Currently in Pain: Yes  Pain Assessment  Pain Assessment: 0-10  Pain Level: 8  Pain Type: Chronic pain  Pain Location: Rib cage  Vital Signs  Patient Currently in Pain: Yes  Pre Treatment Pain Screening  Intervention List: Nurse/physician notified    Orientation  Orientation  Overall Orientation Status: Impaired  Orientation Level: Oriented to place;Oriented to person;Disoriented to time;Disoriented to situation  Social/Functional History  Social/Functional History  Lives With: Alone  Type of Home: Assisted living  Home Layout: One level  Home Access: Level entry  Bathroom Shower/Tub: Walk-in shower  Bathroom Toilet: Standard  Bathroom Equipment: Grab bars in shower, Grab bars around toilet, Hand-held shower, Shower chair  Bathroom Accessibility: [place]: 4 wheeled walker, Cane, Grab bars (loop recorder)  ADL Assistance: Independent (recently needing assistance from aide)  [address]:  (eats simple small meals in her room on her own; amb to dining room for dinner independently with 2VL)  Homemaking Responsibilities: No  Ambulation Assistance: Independent (with [id number])  Transfer Assistance: Independent  Active : No  Patient's  Info: Daughter  IADL Comments: inconstinent of bladder and bowel at times, loose stools  Additional Comments: 1 fall in the shower, against the shower chair, did not fall to the ground, 1 fall to the ground with dizziness about 2 weeks ago (2 days after fall in shower), no pain or injury    Objective     AROM RLE (degrees)  RLE AROM: WFL  AROM LLE (degrees)  LLE AROM : WFL  Strength RLE  Comment: grossly +3/5 to -4/5  Strength LLE  Comment: grossly +3/5 to -4/5     Sensation  Overall Sensation Status: WFL  Bed mobility  Supine to Sit: Moderate assistance  Sit to Supine: Unable to assess  Scooting: Stand by assistance (able to scoot self back in chair)  Transfers  Sit to Stand: Minimal Assistance (from bed and toilet)  Stand to sit: Minimal Assistance (to low toilet and chair)  Ambulation  Ambulation?: Yes  Ambulation 1  Device: Rolling Walker  Other Apparatus: O2 (2L)  Assistance: Minimal assistance  Quality of Gait: amb with flexed  posture, right leaning  Gait Deviations: Slow Faith;Decreased step length;Decreased step height  Distance: 10 ft x 2     Balance  Posture: Poor  Sitting - Static: Fair (SBA, flexed, fatigues quickly and leans onto L elbow)  Sitting - Dynamic: Poor  Standing - Static: Fair  Standing - Dynamic: Poor  Comments: Pt assisted in bathroom to change urine and stool soiled brief; Dep for changing brief and pericare; pt skin noted to be red and zinc skin barrier applied along with clean pull up; Pt stands leaning onto sink with CGA-min A to wash hands        Plan   Plan  Times per week: 3-5x  Times per day: Daily  Current Treatment Recommendations: Strengthening, Balance Training, Functional Mobility Training, Gait Training, Transfer Training, Safety Education & Training  Safety Devices  Type of devices:  All fall risk precautions in place, Call light within reach, Chair alarm in place, Gait belt, Nurse notified, Ltanya Kathy in use, Patient at risk for falls, Left in chair (waffle cushion in place)    AM-PAC Score  AM-PAC Inpatient Mobility Raw Score : 13 (09/15/21 1109)  AM-PAC Inpatient T-Scale Score : 36.74 (09/15/21 1109)  Mobility Inpatient CMS 0-100% Score: 64.91 (09/15/21 1109)  Mobility Inpatient CMS G-Code Modifier : CL (09/15/21 1109)     Goals  Short term goals  Time Frame for Short term goals: discharge  Short term goal 1: bed mobility with CGA  Short term goal 2: sit to/from stand with CGA  Short term goal 3: amb 30 ft with RW with CGA       Therapy Time   Individual Concurrent Group Co-treatment   Time In 1020         Time Out 1103         Minutes 43         Timed Code Treatment Minutes: [address], [address] [facility], [address] [place] [address]

## 2021-09-15 NOTE — PROGRESS NOTES
Facility/Department: [address] ONCOLOGY  Initial Assessment  DYSPHAGIA BEDSIDE SWALLOW EVALUATION     Patient: Arsen Felix   : 1931   MRN: [id number]      Evaluation Date: 9/15/2021   Admitting Diagnosis: YAYO (acute kidney injury) ([facility] [N17.9]  Acute respiratory failure with hypoxemia ([facility] [J96.01]  Pneumonia of left lower lobe due to infectious organism [J18.9]  Chest pain, unspecified type [R07.9]  Pain: Pt did not report pain                        H&P: 80 y. o. female who presents to the emergency department from [facility] complaints of chest pain that started last night.  Patient also complaining of feeling very fatigued and generalized weakness, states that normally by this time of day she would be up and perky.  Denies any urinary frequency, urgency, dysuria or hematuria.  Daughter who is at bedside reports that yesterday patient did have a mild cough, she is vaccinated for COVID-19. Yu Pichardo does reside at [facility]. Yu Pichardo cannot really describe the pain and the pain does not radiate but she states that the pain is 5 out of 10 in severity.  Apparently she is not on any home oxygen but was at 88% upon squad arrival. Yu Pichardo was placed on 4 L of oxygen initially, currently at 2 L of oxygen and 97%.  She does have slight increase in respirations.  She denies any associated diaphoresis or left arm pain, numbness, tingling or weakness    Recent Chest CT:   Negative for acute pulmonary embolism       Left pleural effusion with left lower lobe opacity that could represent   pneumonia. Modified Barium Swallow Study: None per chart    History/Prior Level of Function:   Living Status: Pt in from [facility]    Prior Dysphagia History: None reported. Per chart and Pt's son report, Pt consumes a regular texture diet with thin liquids at facility.  Pt denied difficulty with swallowing or recent respiratory compromise     Dysphagia Impressions/Diagnosis: Mild Oropharyngeal Dysphagia   Pt was seen sitting upright in bed on 2L O2 via nasal canula. Pt reported no difficulty with swallowing or history of dysphagia. Various textures were provided to assess swallow function. Pt presents with mild oropharyngeal dysphagia characterized by mildly prolonged mastication, mildly delayed swallow initiation, and mildly reduced laryngeal elevation upon manual palpation. Pt tolerated thin liquids via cup and straw with mildly delayed swallow initiation noted, no overt clinical s/s of aspiration/penetration were assessed. Mildly prolonged vs age appropriate mastication was noted with regular solids with effective clearing achieved. At this time, Pt appears to be a mild aspiration risk however recommend use of safe swallowing strategies to reduce overall risk. Recommended Diet and Intervention 9/15/2021:  Diet Solids Recommendation:  Regular texture Liquid Consistency Recommendation: Thin liquids  Recommended form of Meds:   Meds as tolerated      Compensatory Swallowing Strategies: Alternate solids/liquids , Upright as possible with all PO intake , Small bites/sips     SHORT TERM DYSPHAGIA GOALS/PLAN OF CARE: Speech therapy for dysphagia tx 3-5 times per week during acute care stay. 1. Pt will functionally tolerate recommended diet with no overt clinical s/s of aspiration  2. Pt will demonstrate understanding of aspiration risk and precautions via education/demonstration with occasional prompting  3. Pt will advance to least restrictive diet as indicated   4. If clinical s/s of aspiration/penetration continue to be noted, Pt will participate in Modified Barium Swallow Study   5.  Pt will demonstrate improved sensory motor function via targeted exercises       Dysphagia Therapeutic Intervention:  Diet Tolerance Monitoring , Patient/Family Education , Therapeutic Trials with SLP     Discharge Recommendations: Do not anticipate need for further speech/dysphagia therapy upon discharge from hospital     Patient Positioning: Upright in bed    Current Diet Level (prior to evaluation): Regular/thins    Respiratory Status:   []Room Air   [x]O2 via nasal cannula (2L)   []Other:    Dentition:  [x]Adequate  []Dentures   []Missing Many Teeth  []Edentulous  []Other:    Baseline Vocal Quality:  [x]Normal  []Dysphonic   []Aphonic   []Hoarse  []Wet  []Weak  []Other:    Volitional Cough:  [x]Strong  []Weak  []Wet  []Absent  []Congested  []Other:    Volitional Swallow:   []Absent   []Delayed     [x]Adequate     []Required use of drink     Oral Mechanism Exam:  [x]WFL []Mild   [] Moderate  []Severe  []To be assessed  Impaired:   []Left side      []Right side    []Labial ROM/Coordination    []Labial Symmetry   []Lingual ROM/Coordination   []Lingual Symmetry  []Gag  []Other:     Oral Phase: []WFL [x]Mild   [] Moderate  []Severe  []To be assessed   [x]Impaired/Prolonged Mastication:   []Spillage Left:   []Spillage Right:  []Pocketing Left:   []Pocketing Right:   []Decreased Anterior to Posterior Transit:   []Suspected Premature Bolus Loss:   []Lingual/Palatal Residue:   []Other:     Pharyngeal Phase: []WFL [x]Mild   [] Moderate  []Severe  []To be assessed   [x]Delayed Swallow:   []Suspected Pharyngeal Pooling:   [x]Decreased Laryngeal Elevation:   []Absent Swallow:  []Wet Vocal Quality:   []Throat Clearing-Immediate:   []Throat Clearing-Delayed:   []Cough-Immediate:   []Cough-Delayed:  []Change in Vital Signs:  []Suspected Delayed Pharyngeal Clearing:  []Other:     Eating Assistance:  []Independent  [x]Setup or clean-up assistance   [] Supervision or touching assistance   [] Partial or moderate assistance   [] Substantial or maximal assistance  [] Dependent     EDUCATION:   Provided education regarding role of SLP, results of assessment, recommendations and general speech pathology plan of care.    [x] Pt verbalized understanding and agreement   [] Pt requires ongoing learning   [] No evidence of comprehension     If patient discharges prior to next visit, this note will serve as discharge.      Timed Code Minutes:  0 minutes   Total Treatment Minutes: 23 minutes     Electronically signed by:  Valarie Flores M.A., [address] [place] [address]  Speech-Language Pathologist

## 2021-09-15 NOTE — PROGRESS NOTES
Per SLP: \"Pt appears to be a mild aspiration risk however recommend use of safe swallowing strategies to reduce overall risk. \"    SLP recs thins/regular diet

## 2021-09-15 NOTE — PROGRESS NOTES
Occupational Therapy   Occupational Therapy Initial Assessment  Date: 9/15/2021   Patient Name: Barbara Felix  MRN: [id number]     : 1931    Date of Service: 9/15/2021    Discharge Recommendations:Chyna Felix scored a 14/24 on the AM-PAC ADL Inpatient form. Current research shows that an AM-PAC score of 17 or less is typically not associated with a discharge to the patient's home setting. Based on the patient's AM-PAC score and their current ADL deficits, it is recommended that the patient have 3-5 sessions per week of Occupational Therapy at d/c to increase the patient's independence. Please see assessment section for further patient specific details. If patient discharges prior to next session this note will serve as a discharge summary. Please see below for the latest assessment towards goals. OT Equipment Recommendations  Equipment Needed: No    Assessment   Performance deficits / Impairments: Decreased functional mobility ; Decreased ADL status; Decreased endurance;Decreased strength;Decreased safe awareness  Assessment: Patient presents with the above defiicts impacting occupational performance and functioning below baseline level. Recommend skilled OT to address deficits and promote functional ADL independence. Patient currently limited by fatigue and cognition. Treatment Diagnosis: Decreased functional mobility, ADLs, safety, endurance associated with YAYO/PNA  Prognosis: Good  Decision Making: Medium Complexity  OT Education: OT Role;Plan of Care  Patient Education: Patient is not an independent learner  REQUIRES OT FOLLOW UP: Yes  Activity Tolerance  Activity Tolerance: Patient limited by fatigue  Safety Devices  Safety Devices in place: Yes  Type of devices: All fall risk precautions in place;Call light within reach; Chair alarm in place; Left in chair;Patient at risk for falls;Gait belt;Nurse notified           Patient Diagnosis(es): The primary encounter diagnosis was Pneumonia of left lower lobe due to infectious organism. Diagnoses of YAYO (acute kidney injury) ([address] and Chest pain, unspecified type were also pertinent to this visit. has a past medical history of Carpal tunnel syndrome of left wrist, Chronic low back pain, Depression, [place] (hard of hearing), Hyperlipidemia LDL goal <130, Hypothyroid, Insomnia, Osteoporosis, and Urinary incontinence. has a past surgical history that includes back surgery and hip pinning (Left, 1/26/2020). Treatment Diagnosis: Decreased functional mobility, ADLs, safety, endurance associated with YAYO/PNA      Restrictions  Restrictions/Precautions  Restrictions/Precautions: Fall Risk (high fall risk  Simultaneous filing. User may not have seen previous data.)  Position Activity Restriction  Other position/activity restrictions: This is a  80 y.o. female who presents to the emergency department from [facility] with complaints of chest pain that started last night. Patient also complaining of feeling very fatigued and generalized weakness    Subjective   General  Chart Reviewed: Yes  Additional Pertinent Hx: [place], HTN  Family / Caregiver Present: Yes (Daughter [place])  Diagnosis: YAYO/PNA  Subjective  Subjective: Patient in bed, agreeable to therapy. States she needs to use bathroom. Patient 8/10 Pain.   RN aware  Patient Currently in Pain: Yes  Pain Assessment  Pain Assessment: 0-10  Pain Level: 8  Pain Type: Chronic pain  Pain Location: Rib cage  Vital Signs  Patient Currently in Pain: Yes  Social/Functional History  Social/Functional History  Lives With: Alone  Type of Home: Assisted living  Home Layout: One level  Home Access: Level entry  Bathroom Shower/Tub: Walk-in shower  Bathroom Toilet: Standard  Bathroom Equipment: Grab bars in shower, Grab bars around toilet, Hand-held shower, Shower chair  Bathroom Accessibility: Accessible  Home Equipment: 4 wheeled walker, Cane, Grab bars (loop recorder)  ADL Assistance: Independent (recently needing assistance from aide)  Homemaking Assistance:  (eats simple small meals in her room on her own; amb to dining room for dinner independently with 4WW)  Homemaking Responsibilities: No  Ambulation Assistance: Independent (with 5UQ)  Transfer Assistance: Independent  Active : No  Patient's  Info: Daughter  IADL Comments: inconstinent of bladder and bowel at times, loose stools  Additional Comments: 1 fall in the shower, against the shower chair, did not fall to the ground, 1 fall to the ground with dizziness about 2 weeks ago (2 days after fall in shower), no pain or injury       Objective   Vision: Impaired  Vision Exceptions: Wears glasses for reading  Hearing: Exceptions to [facility]  Hearing Exceptions: Hard of hearing/hearing concerns; No hearing aid    Orientation  Overall Orientation Status: Impaired  Orientation Level: Oriented to place;Oriented to person;Disoriented to time;Disoriented to situation     Balance  Sitting Balance: Stand by assistance (seated EOB, leans forward on arms due to fatigue)  Standing Balance: Minimal assistance (RW, fatigue)  Functional Mobility  Functional - Mobility Device: Rolling Walker  Activity: To/from bathroom  Assist Level: Minimal assistance  Toilet Transfers  Toilet - Technique: Ambulating  Equipment Used: Standard toilet  Toilet Transfer: Minimal assistance  Toilet Transfers Comments: grab bar  ADL  Feeding: Setup  Grooming: Contact guard assistance (B elbows on sink due to fatigue (hand washing) RW)  UE Bathing: Moderate assistance (wipes due to fatigue)  LE Bathing: Moderate assistance  UE Dressing: Moderate assistance (gown)  LE Dressing: Moderate assistance (due to fatigue)  Toileting: Dependent/Total (incontinent of urine and bowel movement. Assist for  clothing mangement and hygiene. Strong urine odor noted. RN aware. Zinc skin barrier applied)  Additional Comments: Patient limited by fatigue for all ADLS.   Tone RUE  RUE Tone: Normotonic  Tone LUE  LUE Tone: Normotonic     Bed mobility  Supine to Sit: Moderate assistance  Sit to Supine: Unable to assess  Scooting: Stand by assistance (able to scoot self back in chair)  Transfers  Stand Step Transfers: Minimal assistance  Sit to stand: Minimal assistance  Stand to sit: Minimal assistance     Cognition  Overall Cognitive Status: Exceptions  Arousal/Alertness: Delayed responses to stimuli  Following Commands: Follows one step commands with increased time; Follows one step commands with repetition (hard of hearing)  Attention Span: Attends with cues to redirect; Difficulty attending to directions  Memory: Decreased recall of recent events  Safety Judgement: Decreased awareness of need for safety  Problem Solving: Decreased awareness of errors  Insights: Decreased awareness of deficits  Initiation: Requires cues for some  Sequencing: Requires cues for some        Sensation  Overall Sensation Status: WFL        LUE AROM (degrees)  LUE AROM : WFL  RUE AROM (degrees)  RUE AROM : WFL                      Plan   Plan  Times per week: 3-5x  Times per day: Daily  Current Treatment Recommendations: Strengthening, Endurance Training, Equipment Evaluation, Education, & procurement, Self-Care / ADL, Safety Education & Training, Functional Mobility Training      AM-[facility] Score        -[facility] Inpatient Daily Activity Raw Score: 14 (09/15/21 1108)  AM-PAC Inpatient ADL T-Scale Score : 33.39 (09/15/21 1108)  ADL Inpatient CMS 0-100% Score: 59.67 (09/15/21 1108)  ADL Inpatient CMS G-Code Modifier : CK (09/15/21 1108)    Goals  Short term goals  Time Frame for Short term goals: Discharge  Short term goal 1: SBA with LB ADLs  Short term goal 2: SBA with UB ADLs  Short term goal 3: Functional ADL transfers SBA with RW  Short term goal 4: Functional ADL mobility SBA with RW  Short term goal 5: Functional stance with RW SBA for ~5 min       Therapy Time   Individual Concurrent Group Co-treatment   Time In 1020         Time Out 1103         Minutes 43 Timed Code Treatment Minutes: 28  Total Treatment Minutes:  [address], [address] [facility]  Jessi Cronin [address]

## 2021-09-15 NOTE — PROGRESS NOTES
Speech Language Pathology  Swallow Screening        Name: Alyx Felix  : 1931  Medical Diagnosis: YAYO (acute kidney injury) ([facility] [N17.9]  Acute respiratory failure with hypoxemia ([facility] [J96.01]  Pneumonia of left lower lobe due to infectious organism [J18.9]  Chest pain, unspecified type [R07.9]    Swallow screen completed. Patient demonstrates some risk indicators for potential dysphagia / aspiration per swallow screen. RECOMMEND: Clinical Swallow Evaluation at bedside to assess swallowing function, risk of aspiration, and to determine appropriate diet level. Please place order for SLP Eval and Treat if in agreement.       Maxim Tripathi, [address] [facility]  Speech-Language Pathologist  [phone]

## 2021-09-15 NOTE — PROGRESS NOTES
Shift assessment completed. Routine vitals stable. SpO2 95% on 2L O2. Scheduled medications given. Patient is awake, alert and oriented, except to place. Respirations are easy and unlabored. Patient does not appear to be in distress. Call light within reach. Bed alarm on. Pt repositioned in bed. Pt expresses no further needs at this time.

## 2021-09-15 NOTE — PROGRESS NOTES
Pt returned from CXR at this time. \"Bilateral pulmonary congestion with bibasilar effusions. \"  Attending notified with order to d/c continuous fluids.  Also order for SLP eval/Tx

## 2021-09-16 NOTE — CARE COORDINATION
Patient is from [facility] [phone] assisted living and active with [facility], [phone]. They prefer to use [facility] if home care is indicated. Please consider ordering PT/OT for assistance with discharge planning.    FARHAD Armstrong, Case Management

## 2021-09-16 NOTE — PLAN OF CARE
Problem: Pain:  Goal: Pain level will decrease  Description: Pain level will decrease  9/16/2021 0133 by Espinoza Wharton RN  Outcome: Ongoing  Note: Pt has left chest pain under her arm. Tylenol and Norco ordered for pain management. Problem: Pain:  Goal: Control of acute pain  Description: Control of acute pain  9/16/2021 0133 by Espinoza Wharton RN  Outcome: Ongoing  Note: Tylenol given for under arm left chest pain at 9/10, MD called to request something stronger, Norco 3-325 ordered and given. Pt able to fall asleep. Problem: Falls - Risk of:  Goal: Will remain free from falls  Description: Will remain free from falls  9/16/2021 0133 by Espinoza Wharton RN  Outcome: Ongoing  Note: All fall precautions in place, bed in lowest position, frequent rounding to assist with needs. Camera in place for safety. Pt absent of fall this shift. Problem: Skin Integrity:  Goal: Will show no infection signs and symptoms  Description: Will show no infection signs and symptoms  9/16/2021 0133 by Espinoza Wharton RN  Outcome: Ongoing     Problem: Skin Integrity:  Goal: Absence of new skin breakdown  Description: Absence of new skin breakdown  9/16/2021 0133 by Espinoza Wharton RN  Outcome: Ongoing  Note: No new skin breakdown or red bony prominences occuring this shift. Pt repositioned with rounding using pillow support. Protective wipes used PRN. Skin will be kept clean and dry. Turning encouraged.

## 2021-09-16 NOTE — PROGRESS NOTES
Progress Note - Dr. Rodriguez Colindres - Internal Medicine  PCP: No primary care provider on file. No primary physician on file. None    Hospital Day: 2  Code Status: DNR-CC  Current Diet: ADULT DIET; Regular        CC: follow up on medical issues    Subjective:   Dian Felix is a 80 y.o. female. She denies new problems    Remains on 2L o2  covid test negative    procal atill nearly 2 - so appears to be bacterial pna    She denies chest pain, complains of shortness of breath, denies nausea,  denies emesis. 10 system Review of Systems is reviewed with patient, and pertinent positives are noted in HPI above . Otherwise, Review of systems is negative. I have reviewed the patient's medical and social history in detail and updated the computerized patient record. To recap: She  has a past medical history of Carpal tunnel syndrome of left wrist, Chronic low back pain, Depression, [place] (hard of hearing), Hyperlipidemia LDL goal <130, Hypothyroid, Insomnia, Osteoporosis, and Urinary incontinence. . She  has a past surgical history that includes back surgery and hip pinning (Left, 1/26/2020). . She  reports that she has quit smoking. Her smoking use included cigarettes. She has a 2.50 pack-year smoking history. She has never used smokeless tobacco. She reports current alcohol use of about 1.0 standard drinks of alcohol per week. She reports that she does not use drugs. .        Active Hospital Problems    Diagnosis Date Noted    Suspected COVID-19 virus infection [Z20.822] 09/14/2021    YAYO (acute kidney injury) ([facility] [address] [N17.9] 09/14/2021    [facility] with [facility] [J96.01] 09/14/2021    Essential hypertension [I10] 12/18/2018    Hyponatremia [E87.1] 10/02/2018    Pneumonia [J18.9] 10/02/2018    Hyperlipidemia LDL goal <130 [E78.5]     Hypothyroid [E03.9]        Current Facility-Administered Medications: HYDROcodone-acetaminophen (NORCO) 5-325 MG per tablet 1 tablet, 1 tablet, Oral, Q6H PRN  vitamin B-12 (CYANOCOBALAMIN) tablet 50 mcg, 50 mcg, Oral, Daily  multivitamin 1 tablet, 1 tablet, Oral, Daily  oxybutynin (DITROPAN) tablet 5 mg, 5 mg, Oral, Nightly  lisinopril (PRINIVIL;ZESTRIL) tablet 2.5 mg, 2.5 mg, Oral, Daily  levothyroxine (SYNTHROID) tablet 50 mcg, 50 mcg, Oral, QAM AC  donepezil (ARICEPT) tablet 10 mg, 10 mg, Oral, Nightly  furosemide (LASIX) tablet 20 mg, 20 mg, Oral, Daily  sertraline (ZOLOFT) tablet 100 mg, 100 mg, Oral, Daily  sodium chloride flush 0.9 % injection 5-40 mL, 5-40 mL, IntraVENous, 2 times per day  sodium chloride flush 0.9 % injection 10 mL, 10 mL, IntraVENous, PRN  0.9 % sodium chloride infusion, 25 mL, IntraVENous, PRN  ondansetron (ZOFRAN-ODT) disintegrating tablet 4 mg, 4 mg, Oral, Q8H PRN **OR** ondansetron (ZOFRAN) injection 4 mg, 4 mg, IntraVENous, Q6H PRN  magnesium hydroxide (MILK OF MAGNESIA) 400 MG/5ML suspension 30 mL, 30 mL, Oral, Daily PRN  acetaminophen (TYLENOL) tablet 650 mg, 650 mg, Oral, Q6H PRN **OR** acetaminophen (TYLENOL) suppository 650 mg, 650 mg, Rectal, Q6H PRN  cefTRIAXone (ROCEPHIN) 1000 mg in sterile water 10 mL IV syringe, 1,000 mg, IntraVENous, Q24H **AND** azithromycin (ZITHROMAX) tablet 500 mg, 500 mg, Oral, Q24H  hydrALAZINE (APRESOLINE) injection 10 mg, 10 mg, IntraVENous, Q6H PRN  0.9 % sodium chloride bolus, 500 mL, IntraVENous, PRN  heparin (porcine) injection 5,000 Units, 5,000 Units, SubCUTAneous, 3 times per day  albuterol sulfate  (90 Base) MCG/ACT inhaler 2 puff, 2 puff, Inhalation, 4x daily  ipratropium (ATROVENT HFA) 17 MCG/ACT inhaler 2 puff, 2 puff, Inhalation, 4x daily  atorvastatin (LIPITOR) tablet 10 mg, 10 mg, Oral, Nightly  metoprolol tartrate (LOPRESSOR) tablet 12.5 mg, 12.5 mg, Oral, BID         Objective:  BP (!) 141/68   Pulse 74   Temp 98.1 °F (36.7 °C) (Oral)   Resp 16   Ht 5' 6\" (1.676 m)   Wt 107 lb 5.8 oz (48.7 kg)   SpO2 95%   BMI 17.33 kg/m²      Patient Vitals for the past 24 hrs:   BP Temp Temp src Pulse Resp SpO2 Weight   09/16/21 0950 (!) 141/68 98.1 °F (36.7 °C) Oral 74 16 95 % --   09/16/21 0751 -- -- -- -- 16 95 % --   09/16/21 0732 -- -- -- -- -- -- 107 lb 5.8 oz (48.7 kg)   09/16/21 0545 135/71 97.1 °F (36.2 °C) Temporal (!) 16 18 90 % --   09/16/21 0045 (!) 108/59 98.1 °F (36.7 °C) Oral 59 20 96 % --   09/15/21 2126 -- -- -- 63 18 -- --   09/15/21 2045 133/75 97.7 °F (36.5 °C) Oral 72 20 96 % --   09/15/21 1658 -- -- -- -- -- 95 % --   09/15/21 1617 -- -- -- -- -- 94 % --   09/15/21 1615 -- 97.9 °F (36.6 °C) Oral 67 -- (!) 89 % --   09/15/21 1330 -- -- -- -- -- 97 % --   09/15/21 1300 136/67 97.5 °F (36.4 °C) Oral 66 18 94 % --     Patient Vitals for the past 96 hrs (Last 3 readings):   Weight   09/16/21 0732 107 lb 5.8 oz (48.7 kg)   09/14/21 1602 101 lb 3.1 oz (45.9 kg)   09/14/21 0927 100 lb (45.4 kg)         No intake or output data in the 24 hours ending 09/16/21 1025      Physical Exam:   BP (!) 141/68   Pulse 74   Temp 98.1 °F (36.7 °C) (Oral)   Resp 16   Ht 5' 6\" (1.676 m)   Wt 107 lb 5.8 oz (48.7 kg)   SpO2 95%   BMI 17.33 kg/m²   General appearance: alert, appears stated age and cooperative  Head: Normocephalic, without obvious abnormality, atraumatic  Lungs: rales bilat   Heart: regular rate and rhythm, S1, S2 normal, no murmur, click, rub or gallop  Abdomen: soft, non-tender; bowel sounds normal; no masses,  no organomegaly  Extremities: extremities normal, atraumatic, no cyanosis or edema    Labs:  Lab Results   Component Value Date    WBC 18.6 (H) 09/16/2021    HGB 9.1 (L) 09/16/2021    HCT 26.9 (L) 09/16/2021     09/16/2021    CHOL 152 02/03/2017    TRIG 95 02/03/2017    HDL 57 02/03/2017    ALT 6 (L) 09/15/2021    AST 15 09/15/2021     (L) 09/16/2021    K 4.7 09/16/2021    CL 99 09/16/2021    CREATININE 1.4 (H) 09/16/2021    BUN 30 (H) 09/16/2021    CO2 25 09/16/2021    TSH 4.62 (H) 10/02/2018    INR 1.04 09/14/2021     Lab Results   Component Value Date    TROPONINI <0.01 09/14/2021       Recent Imaging Results are Reviewed:  XR CHEST PORTABLE    Result Date: 9/14/2021  EXAMINATION: ONE XRAY VIEW OF THE CHEST 9/14/2021 9:49 am COMPARISON: Chest x-ray dated 01/25/2020. HISTORY: ORDERING SYSTEM PROVIDED HISTORY: chest pain TECHNOLOGIST PROVIDED HISTORY: Reason for exam:->chest pain Reason for Exam: Chest Pain (Arrived by [facility] from [address] rt [place] that began last night. Denies other complaints. ASA given en route. Supplemental O2 provided in triage; Sat 99% on 2L. ) Acuity: Acute Type of Exam: Initial FINDINGS: HEART/MEDIASTINUM: The cardiomediastinal silhouette is stable. PLEURA/LUNGS: There are increased interstitial markings, which are chronic. There is however increased opacity in the left perihilar region which may reflect pulmonary vascular congestion versus an atypical pneumonia. Left basilar opacification reflecting atelectasis, effusion or airspace disease. There is no appreciable pneumothorax. BONES/SOFT TISSUE: No acute abnormality. Left basilar atelectasis, pleural effusion or airspace disease. Left perihilar airspace disease versus pulmonary vascular congestion. CT CHEST PULMONARY EMBOLISM W CONTRAST    Result Date: 9/14/2021  EXAMINATION: CTA OF THE CHEST 9/14/2021 11:12 am TECHNIQUE: CTA of the chest was performed after the administration of intravenous contrast.  Multiplanar reformatted images are provided for review. MIP images are provided for review. Dose modulation, iterative reconstruction, and/or weight based adjustment of the mA/kV was utilized to reduce the radiation dose to as low as reasonably achievable.  COMPARISON: 10/02/2018, chest radiograph done today HISTORY: ORDERING SYSTEM PROVIDED HISTORY: shortness of breath, chest pain, elevated dimer TECHNOLOGIST PROVIDED HISTORY: Reason for exam:->shortness of breath, chest pain, elevated dimer Decision Support Exception - unselect if not a suspected or confirmed emergency medical condition->Emergency Medical Condition (MA) Reason for Exam: SOB, CP, elevated D dimer Acuity: Unknown Type of Exam: Unknown FINDINGS: Pulmonary Arteries: Pulmonary arteries are adequately opacified for evaluation. No evidence of intraluminal filling defect to suggest pulmonary embolism. Main pulmonary artery is normal in caliber. Mediastinum: No evidence of mediastinal lymphadenopathy. The heart and pericardium demonstrate no acute abnormality. There is no acute abnormality of the thoracic aorta. Lungs/pleura: Left pleural effusion. Left lower lobe consolidation. Central airways are patent. No acute airspace disease in the right lung. Upper Abdomen: Limited images of the upper abdomen are unremarkable. Soft Tissues/Bones: Numerous compression deformities again seen throughout the thoracic spine similar to previous exam done 2018. Negative for acute pulmonary embolism Left pleural effusion with left lower lobe opacity that could represent pneumonia. Assessment and Plan:  Principal Problem:    Pneumonia -Established problem. Stable. Pt with elevated wbc, elevated procal. covid ruled out. WBC still high  Plan: escalate abx to cefepime given elevated WBC - take out of droplet plus isolation. Active Problems:    Hypothyroid -Established problem. Stable. Plan: cont on synthroid    Hyperlipidemia LDL goal <130 -Established problem. Stable. Plan: Continue present orders/plan. Hyponatremia -Established problem. Improving. 133  Plan: cont fluids. Essential hypertension -Established problem. Stable. 141/68  Plan: stay on same meds    Suspected COVID-19 virus infection -Established problem, now resolved. covid swab neg  Plan: take out of droplet plus isolation    YAYO (acute kidney injury) ([address] -Established problem. Stable. Creat 1.4  Plan: Continue present orders/plan. Acute respiratory failure with hypoxemia ([address] -Established problem.  Stable Remains on 2L  Plan: cont to tx pna; escalated abx on 9/16            Chinyere Calle MD  9/16/2021

## 2021-09-16 NOTE — PROGRESS NOTES
Speech Language Pathology  Dysphagia Treatment Note    Name:  Rohan Felix  :   1931  Medical Diagnosis:  YAYO (acute kidney injury) ([facility] [address] [N17.9]  Acute respiratory failure with hypoxemia ([facility] [address] [J96.01]  Pneumonia of left lower lobe due to infectious organism [J18.9]  Chest pain, unspecified type [R07.9]  Treatment Diagnosis: Oropharyngeal Dysphagia  Pain level:  Pt denies pain at this time    Diet level prior to treatment: Regular diet with thin liquids/meds as tolerated  Tolerance of Current Diet Level: reduced po intake but no overt signs of aspiration reported    Assessment of Texture Tolerance:  -Impressions: Pt alert and verbally responsive on 2L  O2 via nasal canula. Pt agreeable to limited po trials to assess for po texture tolerance. Mild reduced bolus control and A-P propulsion with suspected premature bolus loss to pharynx noted with thin liquids via cup and straw. Prolonged mastication with delayed but adequate oral clearing noted with solid textures. Pt declined further po trials. Although no overt signs of aspiration noted at this time, precautions should be followed due to reduced bolus control and delayed swallow initiation with all textures. Diet and Treatment Recommendations 2021:  Regular diet with thin liquids/meds as tolerated    Compensatory strategies: Alternate solids/liquids , Upright as possible with all PO intake , Small bites/sips     Dysphagia Goals:   1. Pt will functionally tolerate recommended diet with no overt clinical s/s of aspiration (ongoing 2021)   2. Pt will demonstrate understanding of aspiration risk and precautions via education/demonstration with occasional prompting (ongoing 2021)   3. Pt will advance to least restrictive diet as indicated  (ongoing 2021)   4. If clinical s/s of aspiration/penetration continue to be noted, Pt will participate in Modified Barium Swallow Study (ongoing 2021)   5.  Pt will demonstrate improved sensory motor function via targeted exercises (ongoing 9/16/2021)     Plan:  Continued daily Dysphagia treatment with goals per plan of care. Patient/Family Education:Education given to the Pt and nurse, who verbalized understanding    Discharge Recommendations:  Pt will benefit from continued skilled Speech Therapy for Dysphagia services, prior to returning home. Timed Code Treatment: 0 min    Total Treatment Time: 10 min    If patient discharges prior to next session this note will serve as a discharge summary.      [place] [facility]#[id number]

## 2021-09-16 NOTE — DISCHARGE INSTR - COC
Continuity of Care Form    Patient Name: Xochitl Sarmiento   :  1931  MRN:  [id number]    Admit date:  2021  Discharge date:  21    Code Status Order: DNR-CC   Advance Directives:      Admitting Physician:  Simona Trinh MD  PCP: No primary care provider on file.     Discharging Nurse: Edwige [address] Unit/Room#: 2WE-3256/4440-87  Discharging Unit Phone Number: [phone]    Emergency Contact:   Extended Emergency Contact Information  Primary Emergency Contact: [address] Phone: [phone]  Relation: Child  Secondary Emergency Contact: [address] Phone: [phone]  Relation: Child    Past Surgical History:  Past Surgical History:   Procedure Laterality Date    BACK SURGERY      HIP PINNING Left 2020    LEFT HIP PINNING performed by Susan [facility]udio MD at [facility] OR       Immunization History:   Immunization History   Administered Date(s) Administered    COVID-19, Pfizer, PF, 30mcg/0.3mL 2021, 2021    Influenza Vaccine, unspecified formulation 10/12/2015, 2016    Influenza, High Dose (Fluzone 65 yrs and older) 2016    Pneumococcal Conjugate 13-valent ([id number]) 2016    Pneumococcal Polysaccharide ([id number]) 2017    Tdap (Boostrix, Adacel) 2015       Active Problems:  Patient Active Problem List   Diagnosis Code    Hypothyroid E03.9    Risk for falls Z91.81    Hyperlipidemia LDL goal <130 E78.5    Hyponatremia E87.1    Anemia D64.9    Pneumonia J18.9    Bronchitis J40    Senile osteoporosis M81.0    PAF (paroxysmal atrial fibrillation) (HCC) I48.0    Essential hypertension I10    Encounter for electronic analysis of reveal 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 [address] N17.9    Acute respiratory failure with hypoxemia ([facility] J96.01       Isolation/Infection:   Isolation            No Isolation          Patient Infection Status       Infection Onset Added Last Indicated Last Indicated By Review Planned Expiration Resolved Resolved By    None active    Resolved    COVID-19 Rule Out 09/14/21 09/14/21 09/14/21 COVID-19 (Ordered)   09/15/21 Rule-Out Test Resulted            Nurse Assessment:  Last Vital Signs: BP (!) 149/56   Pulse 77   Temp 97.6 °F (36.4 °C) (Oral)   Resp 16   Ht 5' 6\" (1.676 m)   Wt 107 lb 5.8 oz (48.7 kg)   SpO2 93%   BMI 17.33 kg/m²     Last documented pain score (0-10 scale): Pain Level: 3  Last Weight:   Wt Readings from Last 1 Encounters:   09/16/21 107 lb 5.8 oz (48.7 kg)     Mental Status:  oriented and alert    IV Access:  - None    Nursing Mobility/ADLs:  Walking   Assisted  Transfer  Assisted  Bathing  Assisted  Dressing  Assisted  Toileting  Assisted  Feeding  Assisted  Med Admin  Assisted  Med Delivery   whole    Wound Care Documentation and Therapy:        Elimination:  Continence:   · Bowel: No  · Bladder: No  Urinary Catheter: None   Colostomy/Ileostomy/Ileal Conduit: No       Date of Last BM:    Intake/Output Summary (Last 24 hours) at 9/16/2021 1657  Last data filed at 9/16/2021 1115  Gross per 24 hour   Intake 210 ml   Output --   Net 210 ml     I/O last 3 completed shifts: In: 210 [P.O.:210]  Out: -     Safety Concerns:      At Risk for Falls    Impairments/Disabilities:      None    Nutrition Therapy:  Current Nutrition Therapy:   - Oral Diet:  General    Routes of Feeding: Oral  Liquids: No Restrictions  Daily Fluid Restriction: no  Last Modified Barium Swallow with Video (Video Swallowing Test): not done    Treatments at the Time of Hospital Discharge:   Respiratory Treatments: Albuterol, Ipratropium  Oxygen Therapy:  is on oxygen at 2 L/min per nasal cannula. Ventilator:    - No ventilator support    Rehab Therapies: Physical Therapy and Occupational Therapy  Weight Bearing Status/Restrictions: No weight bearing restirctions  Other Medical Equipment (for information only, NOT a DME order):  bath bench and bedside commode  Other Treatments: None    Patient's personal belongings (please select all that are sent with patient): Bag of belongings    RN SIGNATURE:  Electronically signed by Katia Murphy RN on 9/21/21 at 3:58 PM EDT    CASE MANAGEMENT/SOCIAL WORK SECTION    Inpatient Status Date: 09/21/21    Readmission Risk Assessment Score:  Readmission Risk              Risk of Unplanned Readmission:  18           Discharging to Facility/ Agency   · Name:  [facility]  · Address:  [address]  · Phone:  [phone]  · Fax:  [phone]      / signature: Electronically signed by JOSE CARLOS Paz, ELIAZARW on 9/21/2021 at 12:28 PM      PHYSICIAN SECTION    Prognosis: Guarded    Condition at Discharge: Stable    Rehab Potential (if transferring to Rehab): Good    Recommended Labs or Other Treatments After Discharge: oxygen at 2L    Physician Certification: I certify the above information and transfer of Kimberli Lord  is necessary for the continuing treatment of the diagnosis listed and that she requires [facility] for greater 30 days.      Update Admission H&P: No change in H&P    PHYSICIAN SIGNATURE:  Electronically signed by Manuel Fontaine MD on 9/20/21 at 7:51 AM EDT

## 2021-09-16 NOTE — PROGRESS NOTES
Physical Therapy  Facility/Department: [address]  Daily Treatment Note  NAME: Daphnie Felix  : 1931  MRN: [id number]    Date of Service: 2021    Discharge Recommendations:Chyna Felix scored a 15/24 on the AM-PAC short mobility form. Current research shows that an AM-PAC score of 17 or less is typically not associated with a discharge to the patient's home setting. Based on the patient's AM-PAC score and their current functional mobility deficits, it is recommended that the patient have 3-5 sessions per week of Physical Therapy at d/c to increase the patient's independence. Please see assessment section for further patient specific details. If patient discharges prior to next session this note will serve as a discharge summary. Please see below for the latest assessment towards goals. Subacute/Skilled Nursing Facility   PT Equipment Recommendations  Equipment Needed: No    Assessment   Body structures, Functions, Activity limitations: Decreased functional mobility ; Decreased ADL status; Decreased strength;Decreased safe awareness;Decreased cognition;Decreased endurance;Decreased balance; Increased pain;Decreased posture  Assessment: Pt CGA for bed mob, transfers, gait with RW. Encouragement needed for participation. Pt is limited by the above deficits and would benefit from skilled PT services to maximize pt functional mobility and safety prior to discharge. Pt is requiring min A for transfers, mod A for bed mobility, dependent for pericare and hygeine. Recommend discharge to SNF for further therapy. Treatment Diagnosis: impaired functional mobility  Prognosis: Fair;Good  Decision Making: Medium Complexity  PT Education: PT Role;Goals;Plan of Care;General Safety;Transfer Training;Functional Mobility Training;Home Exercise Program;Family Education;Disease Specific Education;Equipment  Patient Education: Pt verbalized understanding, needing reinforcement.   Barriers to Learning: cognitive, hearing  REQUIRES PT FOLLOW UP: Yes  Activity Tolerance  Activity Tolerance: Patient limited by fatigue;Patient limited by pain     Patient Diagnosis(es): The primary encounter diagnosis was Pneumonia of left lower lobe due to infectious organism. Diagnoses of YAYO (acute kidney injury) ([facility] [facility] and Chest pain, unspecified type were also pertinent to this visit. has a past medical history of Carpal tunnel syndrome of left wrist, Chronic low back pain, Depression, [place] (hard of hearing), Hyperlipidemia LDL goal <130, Hypothyroid, Insomnia, Osteoporosis, and Urinary incontinence. has a past surgical history that includes back surgery and hip pinning (Left, 1/26/2020). Restrictions  Restrictions/Precautions  Restrictions/Precautions: Fall Risk (high fall risk  )  Position Activity Restriction  Other position/activity restrictions: This is a  80 y.o. female who presents to the emergency department from [facility] with complaints of chest pain that started last night. Patient also complaining of feeling very fatigued and generalized weakness  Subjective   General  Chart Reviewed: Yes  Response To Previous Treatment: Patient with no complaints from previous session. Family / Caregiver Present: Yes (dtr)  Subjective  Subjective: Pt reporting L side pain, rates it 3/10. General Comment  Comments: Pt supine in bed upon arrival.  Pain Screening  Patient Currently in Pain: Yes  Pain Assessment  Pain Assessment: 0-10  Pain Level: 3  Pain Type: Acute pain  Pain Location: Rib cage  Pain Orientation: Lower; Left  Vital Signs  Patient Currently in Pain: Yes       Orientation  Orientation  Overall Orientation Status: Impaired  Orientation Level: Oriented to place;Oriented to person;Disoriented to time;Disoriented to situation  Cognition      Objective   Bed mobility  Supine to Sit: Contact guard assistance  Scooting: Contact guard assistance  Comment: Pt slightly impulsive, needing repeated VCs to keep from standing up. Transfers  Sit to Stand: Contact guard assistance (from EOB and toilet.)  Stand to sit: Contact guard assistance  Comment: VCs for hand placment, difficulty following VCs. Ambulation  Ambulation?: Yes  More Ambulation?: No  Ambulation 1  Surface: level tile  Device: Rolling Walker  Other Apparatus: O2 (2LO2)  Assistance: Contact guard assistance  Quality of Gait: flexed trunk,  Gait Deviations: Slow Faith;Decreased step height;Decreased step length  Distance: Pt amb 10 ft, then 25 ft with RW and CGA. Comments: Pt refusing to keep O2 on. 91% seated EOB with no O2 and up to 95% with O2. Pt SOB after amb to bathroom, toileting, performing her own hygiene, assist for donning brief. Pt stood at sink 5 min for washing hands, brushing teeth, combing hair. Pt then amb back to chair and positioned for comfort. Stairs/Curb  Stairs?: No     Balance  Posture: Fair (increased thoracic kyphosis)  Sitting - Static: Fair;+  Sitting - Dynamic: Fair;+  Standing - Static: Fair (with RW)  Standing - Dynamic: Fair (with RW)  Comments: Pt with post LOB when taking hands off RW. Exercises  Hip Flexion: seated marching x 10 bilat  Knee Long Arc Quad: x 10 bilat  Ankle Pumps: HR/TR x 10 bilat         Comment: Standing ADLs at sink, toileting, donning brief, monitoring of O2 sats              G-Code     OutComes Score                                                     AM-PAC Score  AM-PAC Inpatient Mobility Raw Score : 15 (09/16/21 1529)  AM-PAC Inpatient T-Scale Score : 39.45 (09/16/21 1529)  Mobility Inpatient CMS 0-100% Score: 57.7 (09/16/21 1529)  Mobility Inpatient CMS G-Code Modifier : CK (09/16/21 1529)          Goals  Short term goals  Time Frame for Short term goals: discharge  Short term goal 1: bed mobility with CGA - Goal met 9/16  Short term goal 2: sit to/from stand with CGA- Goal met 9/16  Short term goal 3: Pt to amb with RW and CGA 30 ft. Short term goal 4: Pt to perform bed mob with supervision.   Short term goal 5: Pt to perform transfers with supervision. Plan    Plan  Times per week: 3-5x  Times per day: Daily  Current Treatment Recommendations: Strengthening, Balance Training, Functional Mobility Training, Gait Training, Transfer Training, Safety Education & Training, Endurance Training, Pain Management, Home Exercise Program, Patient/Caregiver Education & Training  Safety Devices  Type of devices:  All fall risk precautions in place, Call light within reach, Chair alarm in place, Gait belt, Nurse notified, [place] in use, Patient at risk for falls, Left in chair (waffle cushion in place)  Restraints  Initially in place: No     Therapy Time   Individual Concurrent Group Co-treatment   Time In 1423         Time Out 1516         Minutes 53         Timed Code Treatment Minutes: [email or web address], [id number]

## 2021-09-16 NOTE — PROGRESS NOTES
Physician Progress Note      PATIENT:               Grace Alligator  CSN #:                  [id number]  :                       1931  ADMIT DATE:       2021 9:21 AM  DISCH DATE:  RESPONDING  PROVIDER #:        Keara Sanchez MD          QUERY TEXT:    Pt admitted with Pneumonia. Pt noted to have elevated WBC, procalcitonin and   CRP, also some hypotension. If possible, please document in the progress notes   and/or discharge summary if you are evaluating and /or treating any of the   following: The medical record reflects the following:  Risk Factors: Presents with fatigue and found to be positive for PNA  Clinical Indicators: B/P as low as 77/67; WBC 13.8, Procalcitonin1.94, CRP   189.6; Disgnosed with PNA and YAYO and Resp Failure  Treatment: IV bolus and IV Fluids, IV Rocephin, IV Zithromax  Options provided:  -- Sepsis, present on admission  -- Sepsis, present on admission, now resolved  -- Localized infection such as pneumonia, without Sepsis  -- Sepsis was ruled out  -- Other - I will add my own diagnosis  -- Disagree - Not applicable / Not valid  -- Disagree - Clinically unable to determine / Unknown  -- Refer to Clinical Documentation Reviewer    PROVIDER RESPONSE TEXT:    This patient has sepsis which was present on admission. Query created by: Shruti Garcia on 9/15/2021 10:34 AM      QUERY TEXT:    Patient admitted with PNA. Noted documentation of acute respiratory failure in   progress note on 9/15/2021. In order to support the diagnosis of acute   respiratory failure, please include additional clinical indicators in your   documentation, or please document if the diagnosis of acute respiratory   failure has been ruled out after further study. The medical record reflects the following:  Risk Factors: Presents with C/O SOB;  Diagnosed with PNA  Clinical Indicators: No O2 sats less than 91%; per ED: Crackles appreciated at   bilateral bases without retractions or accessory muscle use or wheezing. Treatment: O2 per NC 1-2L    Acute Respiratory Failure Clinical Indicators per [employer] MS-DRG Training Guide and   Quick Reference Guide:  pO2 < 60 mmHg or SpO2 (pulse oximetry) < 91% breathing room air  pCO2 > 50 and pH < 7.35  P/F ratio (pO2 / FIO2) < 300  pO2 decrease or pCO2 increase by 10 mmHg from baseline (if known)  Supplemental oxygen of 40% or more  Presence of respiratory distress, tachypnea, dyspnea, shortness of breath,   wheezing  Unable to speak in complete sentences  Use of accessory muscles to breathe  Extreme anxiety and feeling of impending doom  Tripod position  Confusion/altered mental status/obtunded  Options provided:  -- Acute Respiratory Failure as evidenced by (please provide indicators),   Please document evidence.   -- Acute Respiratory Failure ruled out after study  -- Other - I will add my own diagnosis  -- Disagree - Not applicable / Not valid  -- Disagree - Clinically unable to determine / Unknown  -- Refer to Clinical Documentation Reviewer    PROVIDER RESPONSE TEXT:    This patient was in acute respiratory failure as evidenced by new o2 req    Query created by: Charisma Lewis on 9/15/2021 10:38 AM      Electronically signed by:  Princess Nancy BILL 9/16/2021 10:25 AM

## 2021-09-17 NOTE — PROGRESS NOTES
Progress Note - Dr. Sommer Nicole - Internal Medicine  PCP: No primary care provider on file. No primary physician on file. None    Hospital Day: 3  Code Status: DNR-CC  Current Diet: ADULT DIET; Regular        CC: follow up on medical issues    Subjective:   Rohan Felix is a 80 y.o. female. She denies new problems    Weaned off 2L o2 yest    procal is 2.36 - so appears to be bacterial pna    She denies chest pain, complains of shortness of breath, denies nausea,  denies emesis. 10 system Review of Systems is reviewed with patient, and pertinent positives are noted in HPI above . Otherwise, Review of systems is negative. I have reviewed the patient's medical and social history in detail and updated the computerized patient record. To recap: She  has a past medical history of Carpal tunnel syndrome of left wrist, Chronic low back pain, Depression, [place] (hard of hearing), Hyperlipidemia LDL goal <130, Hypothyroid, Insomnia, Osteoporosis, and Urinary incontinence. . She  has a past surgical history that includes back surgery and hip pinning (Left, 1/26/2020). . She  reports that she has quit smoking. Her smoking use included cigarettes. She has a 2.50 pack-year smoking history. She has never used smokeless tobacco. She reports current alcohol use of about 1.0 standard drinks of alcohol per week. She reports that she does not use drugs. .        Active Hospital Problems    Diagnosis Date Noted    Suspected COVID-19 virus infection [Z20.822] 09/14/2021    [facility] [N17.9] 09/14/2021    Acute respiratory failure with hypoxemia ([facility] [J96.01] 09/14/2021    Essential hypertension [I10] 12/18/2018    Hyponatremia [E87.1] 10/02/2018    Pneumonia [J18.9] 10/02/2018    Hyperlipidemia LDL goal <130 [E78.5]     Hypothyroid [E03.9]        Current Facility-Administered Medications: cefepime (MAXIPIME) 1000 mg IVPB minibag, 1,000 mg, IntraVENous, Q12H  ipratropium-albuterol (DUONEB) nebulizer solution 1 ampule, 1 ampule, Inhalation, Q4H WA  HYDROcodone-acetaminophen (NORCO) 5-325 MG per tablet 1 tablet, 1 tablet, Oral, Q6H PRN  vitamin B-12 (CYANOCOBALAMIN) tablet 50 mcg, 50 mcg, Oral, Daily  multivitamin 1 tablet, 1 tablet, Oral, Daily  oxybutynin (DITROPAN) tablet 5 mg, 5 mg, Oral, Nightly  lisinopril (PRINIVIL;ZESTRIL) tablet 2.5 mg, 2.5 mg, Oral, Daily  levothyroxine (SYNTHROID) tablet 50 mcg, 50 mcg, Oral, QAM AC  donepezil (ARICEPT) tablet 10 mg, 10 mg, Oral, Nightly  furosemide (LASIX) tablet 20 mg, 20 mg, Oral, Daily  sertraline (ZOLOFT) tablet 100 mg, 100 mg, Oral, Daily  sodium chloride flush 0.9 % injection 5-40 mL, 5-40 mL, IntraVENous, 2 times per day  sodium chloride flush 0.9 % injection 10 mL, 10 mL, IntraVENous, PRN  0.9 % sodium chloride infusion, 25 mL, IntraVENous, PRN  ondansetron (ZOFRAN-ODT) disintegrating tablet 4 mg, 4 mg, Oral, Q8H PRN **OR** ondansetron (ZOFRAN) injection 4 mg, 4 mg, IntraVENous, Q6H PRN  magnesium hydroxide (MILK OF MAGNESIA) 400 MG/5ML suspension 30 mL, 30 mL, Oral, Daily PRN  acetaminophen (TYLENOL) tablet 650 mg, 650 mg, Oral, Q6H PRN **OR** acetaminophen (TYLENOL) suppository 650 mg, 650 mg, Rectal, Q6H PRN  hydrALAZINE (APRESOLINE) injection 10 mg, 10 mg, IntraVENous, Q6H PRN  0.9 % sodium chloride bolus, 500 mL, IntraVENous, PRN  heparin (porcine) injection 5,000 Units, 5,000 Units, SubCUTAneous, 3 times per day  atorvastatin (LIPITOR) tablet 10 mg, 10 mg, Oral, Nightly  metoprolol tartrate (LOPRESSOR) tablet 12.5 mg, 12.5 mg, Oral, BID         Objective:  BP (!) 151/77   Pulse 75   Temp 98.1 °F (36.7 °C) (Temporal)   Resp 16   Ht 5' 6\" (1.676 m)   Wt 107 lb 5.8 oz (48.7 kg)   SpO2 91%   BMI 17.33 kg/m²      Patient Vitals for the past 24 hrs:   BP Temp Temp src Pulse Resp SpO2   09/17/21 0800 -- -- -- -- -- 91 %   09/17/21 0530 (!) 151/77 98.1 °F (36.7 °C) Temporal 75 16 (!) 89 %   09/17/21 0215 -- -- -- -- 16 --   09/16/21 2331 (!) 120/55 97.1 °F (36.2 °C) Temporal 71 18 90 %   09/16/21 2116 -- -- -- -- 18 93 %   09/16/21 2015 (!) 157/78 99 °F (37.2 °C) Temporal 73 16 91 %   09/16/21 1615 (!) 149/56 97.6 °F (36.4 °C) Oral 77 16 93 %   09/16/21 1551 -- -- -- -- 16 94 %   09/16/21 1115 (!) 167/68 98 °F (36.7 °C) Oral 65 18 90 %   09/16/21 0950 (!) 141/68 98.1 °F (36.7 °C) Oral 74 16 95 %     Patient Vitals for the past 96 hrs (Last 3 readings):   Weight   09/16/21 0732 107 lb 5.8 oz (48.7 kg)   09/14/21 1602 101 lb 3.1 oz (45.9 kg)   09/14/21 0927 100 lb (45.4 kg)           Intake/Output Summary (Last 24 hours) at 9/17/2021 0810  Last data filed at 9/17/2021 0530  Gross per 24 hour   Intake 210 ml   Output 150 ml   Net 60 ml         Physical Exam:   BP (!) 151/77   Pulse 75   Temp 98.1 °F (36.7 °C) (Temporal)   Resp 16   Ht 5' 6\" (1.676 m)   Wt 107 lb 5.8 oz (48.7 kg)   SpO2 91%   BMI 17.33 kg/m²   General appearance: alert, appears stated age and cooperative  Head: Normocephalic, without obvious abnormality, atraumatic  Lungs: rales bilat   Heart: regular rate and rhythm, S1, S2 normal, no murmur, click, rub or gallop  Abdomen: soft, non-tender; bowel sounds normal; no masses,  no organomegaly  Extremities: extremities normal, atraumatic, no cyanosis or edema    Labs:  Lab Results   Component Value Date    WBC 15.3 (H) 09/17/2021    HGB 8.8 (L) 09/17/2021    HCT 25.6 (L) 09/17/2021     (H) 09/17/2021    CHOL 152 02/03/2017    TRIG 95 02/03/2017    HDL 57 02/03/2017    ALT 6 (L) 09/15/2021    AST 15 09/15/2021     (L) 09/17/2021    K 3.8 09/17/2021     09/17/2021    CREATININE 1.2 09/17/2021    BUN 23 (H) 09/17/2021    CO2 21 09/17/2021    TSH 4.62 (H) 10/02/2018    INR 1.04 09/14/2021    LABMICR YES 09/17/2021     Lab Results   Component Value Date    TROPONINI <0.01 09/14/2021       Recent Imaging Results are Reviewed:  XR CHEST PORTABLE    Result Date: 9/14/2021  EXAMINATION: ONE XRAY VIEW OF THE CHEST 9/14/2021 9:49 am COMPARISON: Chest x-ray dated 01/25/2020. HISTORY: ORDERING SYSTEM PROVIDED HISTORY: chest pain TECHNOLOGIST PROVIDED HISTORY: Reason for exam:->chest pain Reason for Exam: Chest Pain (Arrived by [facility] from [address] rt [place] that began last night. Denies other complaints. ASA given en route. Supplemental O2 provided in triage; Sat 99% on 2L. ) Acuity: Acute Type of Exam: Initial FINDINGS: HEART/MEDIASTINUM: The cardiomediastinal silhouette is stable. PLEURA/LUNGS: There are increased interstitial markings, which are chronic. There is however increased opacity in the left perihilar region which may reflect pulmonary vascular congestion versus an atypical pneumonia. Left basilar opacification reflecting atelectasis, effusion or airspace disease. There is no appreciable pneumothorax. BONES/SOFT TISSUE: No acute abnormality. Left basilar atelectasis, pleural effusion or airspace disease. Left perihilar airspace disease versus pulmonary vascular congestion. CT CHEST PULMONARY EMBOLISM W CONTRAST    Result Date: 9/14/2021  EXAMINATION: CTA OF THE CHEST 9/14/2021 11:12 am TECHNIQUE: CTA of the chest was performed after the administration of intravenous contrast.  Multiplanar reformatted images are provided for review. MIP images are provided for review. Dose modulation, iterative reconstruction, and/or weight based adjustment of the mA/kV was utilized to reduce the radiation dose to as low as reasonably achievable.  COMPARISON: 10/02/2018, chest radiograph done today HISTORY: ORDERING SYSTEM PROVIDED HISTORY: shortness of breath, chest pain, elevated dimer TECHNOLOGIST PROVIDED HISTORY: Reason for exam:->shortness of breath, chest pain, elevated dimer Decision Support Exception - unselect if not a suspected or confirmed emergency medical condition->Emergency Medical Condition (MA) Reason for Exam: SOB, CP, elevated D dimer Acuity: Unknown Type of Exam: Unknown FINDINGS: Pulmonary Arteries: Pulmonary arteries are adequately opacified for evaluation. No evidence of intraluminal filling defect to suggest pulmonary embolism. Main pulmonary artery is normal in caliber. Mediastinum: No evidence of mediastinal lymphadenopathy. The heart and pericardium demonstrate no acute abnormality. There is no acute abnormality of the thoracic aorta. Lungs/pleura: Left pleural effusion. Left lower lobe consolidation. Central airways are patent. No acute airspace disease in the right lung. Upper Abdomen: Limited images of the upper abdomen are unremarkable. Soft Tissues/Bones: Numerous compression deformities again seen throughout the thoracic spine similar to previous exam done 2018. Negative for acute pulmonary embolism Left pleural effusion with left lower lobe opacity that could represent pneumonia. Assessment and Plan:  Principal Problem:    Pneumonia -Established problem. Stable. Pt with elevated wbc, elevated procal. covid ruled out. WBC still high  Plan: escalated abx to cefepime on 9/16, appears to be improving   Active Problems:    Hypothyroid -Established problem. Stable. Plan: cont on synthroid    Hyperlipidemia LDL goal <130 -Established problem. Stable. Plan: Continue present orders/plan. Hyponatremia -Established problem. Improving. 132  Plan: cont fluids. Essential hypertension -Established problem. Stable. 151/77  Plan: stay on same meds    Suspected COVID-19 virus infection -Established problem, now resolved. covid swab neg  Plan: take out of droplet plus isolation    YAYO (acute kidney injury) ([address] -Established problem. Stable. Creat 1.2  Plan: Continue present orders/plan. Acute respiratory failure with hypoxemia ([address] -Established problem. Stable Remains on 2L  Plan: cont to tx pna; escalated abx on 9/16    Disp - at least one more day Iv abx. Therapy recommending SNF.  Will see if pt/family open to this        Brent Knight MD  9/17/2021

## 2021-09-17 NOTE — PLAN OF CARE
Problem: Pain:  Goal: Pain level will decrease  Description: Pain level will decrease  Outcome: Ongoing  Note: Pain left rib cage 8/10. Problem: Pain:  Goal: Control of acute pain  Description: Control of acute pain  Outcome: Ongoing  Note: Norco given for pain upon request. Tylenol available. Problem: Pain:  Goal: Control of chronic pain  Description: Control of chronic pain  Outcome: Ongoing     Problem: Falls - Risk of:  Goal: Will remain free from falls  Description: Will remain free from falls  Outcome: Ongoing  Note: All fall precautions in place, bed in lowest position, frequent rounding to assist with needs. Pt absent of fall this shift. Problem: Falls - Risk of:  Goal: Absence of physical injury  Description: Absence of physical injury  Outcome: Ongoing  Note: Pt will be assisted to bathroom with walker. Problem: Skin Integrity:  Goal: Will show no infection signs and symptoms  Description: Will show no infection signs and symptoms  Outcome: Ongoing  Note: Pt reminded to turn and reposition through out night. Assistance provided. Problem: Skin Integrity:  Goal: Absence of new skin breakdown  Description: Absence of new skin breakdown  Outcome: Ongoing  Note: No new skin breakdown or red bony prominences occuring this shift. Pt repositioned with rounding using pillow support. Protective wipes used PRN. Skin will be kept clean and dry. Ambulation and turning encouraged.

## 2021-09-17 NOTE — PROGRESS NOTES
Assessment complete. Vitals:    09/16/21 2015   BP: (!) 157/78   Pulse: 73   Resp: 16   Temp: 99 °F (37.2 °C)   SpO2: 91%   No SOB or any distress noted. Pt c/o pain to her left rib cage under her arm at 8/10. Norco given as ordered. All other evening medications given. POC discussed and agreed upon. Toileting offered and pt denied at this time. Call light within reach, pt encouraged to call if any needs arise. No further requests at this time. Will continue to monitor.

## 2021-09-17 NOTE — PROGRESS NOTES
goal <130, Hypothyroid, Insomnia, Osteoporosis, and Urinary incontinence. has a past surgical history that includes back surgery and hip pinning (Left, 1/26/2020). Restrictions  Restrictions/Precautions  Restrictions/Precautions: Fall Risk (high)  Required Braces or Orthoses?: No  Position Activity Restriction  Other position/activity restrictions: This is a  80 y.o. female who presents to the emergency department from [facility] with complaints of chest pain that started last night. Patient also complaining of feeling very fatigued and generalized weakness  Subjective   General  Chart Reviewed: Yes  Response To Previous Treatment: Patient with no complaints from previous session. Family / Caregiver Present: No  Subjective  Subjective: Pin with coughing in the L ribs but unable to rate  General Comment  Comments: Pt supine in bed upon arrival.  Expressed poor night of sleep          Orientation  Orientation  Overall Orientation Status: Within Functional Limits  Cognition      Objective   Bed mobility  Supine to Sit: Minimal assistance (\"Pt. needed assist d/t felling\"tired\")  Sit to Supine: Unable to assess (left up in chair)  Comment: Very slow, \" I can't be rushed\"  Transfers  Sit to Stand: Contact guard assistance  Stand to sit: Contact guard assistance  Ambulation  Ambulation?: Yes  Ambulation 1  Surface: level tile  Device: Rolling Walker  Other Apparatus:  (RA)  Assistance: Contact guard assistance  Quality of Gait: Tends to stand posterior to the walker  Gait Deviations: Slow Faith;Decreased step height;Decreased step length  Distance: 66' x 1  Comments: Needed encouragement d/t feeling fatigued     Balance  Posture: Fair  Sitting - Static: Good;-  Sitting - Dynamic: Good;-  Standing - Static: Fair  Standing - Dynamic: Fair  Exercises  Comments: Encouraged AP's and LAQ's to complete later this date. Pt. was resistive.          Comment: Set up for comfort in the chair with reheated breakfast G-Code     OutComes Score                                                     AM-PAC Score  AM-PAC Inpatient Mobility Raw Score : 17 (09/17/21 0940)  AM-PAC Inpatient T-Scale Score : 42.13 (09/17/21 0940)  Mobility Inpatient CMS 0-100% Score: 50.57 (09/17/21 0940)  Mobility Inpatient CMS G-Code Modifier : CK (09/17/21 0940)          Goals  Short term goals  Time Frame for Short term goals: discharge  Short term goal 1: bed mobility with CGA - Goal met 9/16  Short term goal 2: sit to/from stand with CGA- Goal met 9/16  Short term goal 3: Pt to amb with RW and CGA 30 ft. Short term goal 4: Pt to perform bed mob with supervision. Short term goal 5: Pt to perform transfers with supervision. Plan    Plan  Times per week: 3-5x  Times per day: Daily  Current Treatment Recommendations: Strengthening, Balance Training, Functional Mobility Training, Gait Training, Transfer Training, Safety Education & Training, Endurance Training, Pain Management, Home Exercise Program, Patient/Caregiver Education & Training  Safety Devices  Type of devices:  All fall risk precautions in place, Call light within reach, Chair alarm in place, Gait belt, Nurse notified, Anitra Foot in use, Patient at risk for falls, Left in chair  Restraints  Initially in place: No     Therapy Time   Individual Concurrent Group Co-treatment   Time In 0911         Time Out 0939         Minutes 28         Timed Code Treatment Minutes: [facility], [address] [place] [address], [id number]

## 2021-09-17 NOTE — PLAN OF CARE
Problem: Pain:  Goal: Pain level will decrease  Description: Pain level will decrease  9/17/2021 1836 by Jeny Greenberg RN  Outcome: Ongoing     Problem: Pain:  Goal: Control of acute pain  Description: Control of acute pain  9/17/2021 1836 by Jeny Greenberg RN  Outcome: Ongoing     Problem: Pain:  Goal: Control of chronic pain  Description: Control of chronic pain  9/17/2021 1836 by Jeny Greenberg RN  Outcome: Ongoing     Problem: Falls - Risk of:  Goal: Will remain free from falls  Description: Will remain free from falls  9/17/2021 1836 by Jeny Greenberg RN  Outcome: Ongoing     Problem: Falls - Risk of:  Goal: Absence of physical injury  Description: Absence of physical injury  9/17/2021 1836 by Jeny Greenberg RN  Outcome: Ongoing     Problem: Skin Integrity:  Goal: Will show no infection signs and symptoms  Description: Will show no infection signs and symptoms  9/17/2021 1836 by Sami Antonio RN  Outcome: Ongoing     Problem: Skin Integrity:  Goal: Absence of new skin breakdown  Description: Absence of new skin breakdown  9/17/2021 1836 by Sami Antonio RN  Outcome: Ongoing

## 2021-09-17 NOTE — CARE COORDINATION
Met with patient's sister, Jadon Frye [phone], and she requested referrals to [facility], [address]. Referrals were initiatied. Gary Garcia [address] [place] [address] will accept. Vivienne Francoisndria, [phone]  from [facility] will accept when ready for discharge. Call report [phone], fas [phone]. She will need another Rapid covid. Family's first choice is [facility].

## 2021-09-17 NOTE — PROGRESS NOTES
Speech Language Pathology  Dysphagia Treatment Note    Name:  Daphnie Don Felix  :   1931  Medical Diagnosis:  YAYO (acute kidney injury) ([facility] [address] [N17.9]  Acute respiratory failure with hypoxemia (HCC) [J96.01]  Pneumonia of left lower lobe due to infectious organism [J18.9]  Chest pain, unspecified type [R07.9]  Treatment Diagnosis: Oropharyngeal Dysphagia  Pain level:  None per pt    Diet level prior to treatment: Regular diet with thin liquids/meds as tolerated  Tolerance of Current Diet Level: RN continues to report reduced PO intake but no concerns with tolerance    Assessment of Texture Tolerance:  -Impressions: Pt alert and verbally responsive but reports fatigue, positioned upright in chair at bedside. On RA with RR between 16-20/min. Pt eating independently off of breakfast tray upon SLP arrival, garg and toast with thin liquids via cup edge and straw. Oral phase characterized by good oral acceptance and adequate labial seal, mildly prolonged oral manipulation and mastication. Suspect mildly reduced bolus control, clinical s/s premature bolus loss with liquids. Although A-P transit was delayed, it was adequate with no significant oral residue post swallow. No immediate or delayed overt clinical s/s aspiration across presentations. Recommend continuation of current diet with ongoing aspiration precautions. Diet and Treatment Recommendations 2021:  Regular diet with thin liquids/meds as tolerated    Compensatory strategies: Alternate solids/liquids , Upright as possible with all PO intake , Small bites/sips     Dysphagia Goals:   1. Pt will functionally tolerate recommended diet with no overt clinical s/s of aspiration (ongoing 2021)   2. Pt will demonstrate understanding of aspiration risk and precautions via education/demonstration with occasional prompting (ongoing 2021)   3. Pt will advance to least restrictive diet as indicated  (ongoing 2021)   4.  If clinical s/s of aspiration/penetration continue to be noted, Pt will participate in Modified Barium Swallow Study (ongoing 9/17/2021)   5. Pt will demonstrate improved sensory motor function via targeted exercises (ongoing 9/17/2021)     Plan: Continued daily Dysphagia treatment with goals per plan of care. Patient/Family Education: Education given to the Pt and nurse, who verbalized understanding. Discharge Recommendations:  Pt will benefit from continued skilled Speech Therapy for Dysphagia services, prior to returning home. Timed Code Treatment: 0 min    Total Treatment Time: 13 min    If patient discharges prior to next session this note will serve as a discharge summary.        Sarah Wood, [address] [facility]  Speech-Language Pathologist  [phone]

## 2021-09-18 NOTE — PROGRESS NOTES
Progress Note - Dr. Claire Robles - Internal Medicine  PCP: No primary care provider on file. No primary physician on file. None    Hospital Day: 4  Code Status: DNR-CC  Current Diet: ADULT DIET; Regular        CC: follow up on medical issues    Subjective:   Steph Felix is a 80 y.o. female. She denies new problems  On 3L o2    procal is elevated - so appears to be bacterial pna    WBC improved with cefepime,  Would like to see one more day of improvement as well as poss weaning down o2    She denies chest pain, complains of shortness of breath, denies nausea,  denies emesis. 10 system Review of Systems is reviewed with patient, and pertinent positives are noted in HPI above . Otherwise, Review of systems is negative. I have reviewed the patient's medical and social history in detail and updated the computerized patient record. To recap: She  has a past medical history of Carpal tunnel syndrome of left wrist, Chronic low back pain, Depression, [place] (hard of hearing), Hyperlipidemia LDL goal <130, Hypothyroid, Insomnia, Osteoporosis, and Urinary incontinence. . She  has a past surgical history that includes back surgery and hip pinning (Left, 1/26/2020). . She  reports that she has quit smoking. Her smoking use included cigarettes. She has a 2.50 pack-year smoking history. She has never used smokeless tobacco. She reports current alcohol use of about 1.0 standard drinks of alcohol per week. She reports that she does not use drugs. .        Active Hospital Problems    Diagnosis Date Noted    Suspected COVID-19 virus infection [Z20.822] 09/14/2021    [facility] [N17.9] 09/14/2021    Acute respiratory failure with hypoxemia ([facility] [J96.01] 09/14/2021    Essential hypertension [I10] 12/18/2018    Hyponatremia [E87.1] 10/02/2018    Pneumonia [J18.9] 10/02/2018    Hyperlipidemia LDL goal <130 [E78.5]     Hypothyroid [E03.9]        Current Facility-Administered Medications: cefepime (MAXIPIME) 1000 mg IVPB minibag, 1,000 mg, IntraVENous, Q12H  ipratropium-albuterol (DUONEB) nebulizer solution 1 ampule, 1 ampule, Inhalation, Q4H WA  HYDROcodone-acetaminophen (NORCO) 5-325 MG per tablet 1 tablet, 1 tablet, Oral, Q6H PRN  vitamin B-12 (CYANOCOBALAMIN) tablet 50 mcg, 50 mcg, Oral, Daily  multivitamin 1 tablet, 1 tablet, Oral, Daily  oxybutynin (DITROPAN) tablet 5 mg, 5 mg, Oral, Nightly  lisinopril (PRINIVIL;ZESTRIL) tablet 2.5 mg, 2.5 mg, Oral, Daily  levothyroxine (SYNTHROID) tablet 50 mcg, 50 mcg, Oral, QAM AC  donepezil (ARICEPT) tablet 10 mg, 10 mg, Oral, Nightly  furosemide (LASIX) tablet 20 mg, 20 mg, Oral, Daily  sertraline (ZOLOFT) tablet 100 mg, 100 mg, Oral, Daily  sodium chloride flush 0.9 % injection 5-40 mL, 5-40 mL, IntraVENous, 2 times per day  sodium chloride flush 0.9 % injection 10 mL, 10 mL, IntraVENous, PRN  0.9 % sodium chloride infusion, 25 mL, IntraVENous, PRN  ondansetron (ZOFRAN-ODT) disintegrating tablet 4 mg, 4 mg, Oral, Q8H PRN **OR** ondansetron (ZOFRAN) injection 4 mg, 4 mg, IntraVENous, Q6H PRN  magnesium hydroxide (MILK OF MAGNESIA) 400 MG/5ML suspension 30 mL, 30 mL, Oral, Daily PRN  acetaminophen (TYLENOL) tablet 650 mg, 650 mg, Oral, Q6H PRN **OR** acetaminophen (TYLENOL) suppository 650 mg, 650 mg, Rectal, Q6H PRN  hydrALAZINE (APRESOLINE) injection 10 mg, 10 mg, IntraVENous, Q6H PRN  0.9 % sodium chloride bolus, 500 mL, IntraVENous, PRN  heparin (porcine) injection 5,000 Units, 5,000 Units, SubCUTAneous, 3 times per day  atorvastatin (LIPITOR) tablet 10 mg, 10 mg, Oral, Nightly  metoprolol tartrate (LOPRESSOR) tablet 12.5 mg, 12.5 mg, Oral, BID         Objective:  BP (!) 175/73   Pulse 83   Temp 97.1 °F (36.2 °C) (Temporal)   Resp 20   Ht 5' 6\" (1.676 m)   Wt 100 lb 8.5 oz (45.6 kg)   SpO2 95%   BMI 16.23 kg/m²      Patient Vitals for the past 24 hrs:   BP Temp Temp src Pulse Resp SpO2   09/18/21 0919 -- -- -- -- 20 95 %   09/18/21 0830 (!) 175/73 97.1 °F (36.2 °C) Date: 9/14/2021  EXAMINATION: ONE XRAY VIEW OF THE CHEST 9/14/2021 9:49 am COMPARISON: Chest x-ray dated 01/25/2020. HISTORY: ORDERING SYSTEM PROVIDED HISTORY: chest pain TECHNOLOGIST PROVIDED HISTORY: Reason for exam:->chest pain Reason for Exam: Chest Pain (Arrived by [facility] from [address] rt [place] that began last night. Denies other complaints. ASA given en route. Supplemental O2 provided in triage; Sat 99% on 2L. ) Acuity: Acute Type of Exam: Initial FINDINGS: HEART/MEDIASTINUM: The cardiomediastinal silhouette is stable. PLEURA/LUNGS: There are increased interstitial markings, which are chronic. There is however increased opacity in the left perihilar region which may reflect pulmonary vascular congestion versus an atypical pneumonia. Left basilar opacification reflecting atelectasis, effusion or airspace disease. There is no appreciable pneumothorax. BONES/SOFT TISSUE: No acute abnormality. Left basilar atelectasis, pleural effusion or airspace disease. Left perihilar airspace disease versus pulmonary vascular congestion. CT CHEST PULMONARY EMBOLISM W CONTRAST    Result Date: 9/14/2021  EXAMINATION: CTA OF THE CHEST 9/14/2021 11:12 am TECHNIQUE: CTA of the chest was performed after the administration of intravenous contrast.  Multiplanar reformatted images are provided for review. MIP images are provided for review. Dose modulation, iterative reconstruction, and/or weight based adjustment of the mA/kV was utilized to reduce the radiation dose to as low as reasonably achievable.  COMPARISON: 10/02/2018, chest radiograph done today HISTORY: ORDERING SYSTEM PROVIDED HISTORY: shortness of breath, chest pain, elevated dimer TECHNOLOGIST PROVIDED HISTORY: Reason for exam:->shortness of breath, chest pain, elevated dimer Decision Support Exception - unselect if not a suspected or confirmed emergency medical condition->Emergency Medical Condition (MA) Reason for Exam: SOB, CP, elevated D dimer Acuity: Unknown Type of Exam: Unknown FINDINGS: Pulmonary Arteries: Pulmonary arteries are adequately opacified for evaluation. No evidence of intraluminal filling defect to suggest pulmonary embolism. Main pulmonary artery is normal in caliber. Mediastinum: No evidence of mediastinal lymphadenopathy. The heart and pericardium demonstrate no acute abnormality. There is no acute abnormality of the thoracic aorta. Lungs/pleura: Left pleural effusion. Left lower lobe consolidation. Central airways are patent. No acute airspace disease in the right lung. Upper Abdomen: Limited images of the upper abdomen are unremarkable. Soft Tissues/Bones: Numerous compression deformities again seen throughout the thoracic spine similar to previous exam done 2018. Negative for acute pulmonary embolism Left pleural effusion with left lower lobe opacity that could represent pneumonia. Assessment and Plan:  Principal Problem:    Pneumonia -Established problem. Stable. Pt with elevated wbc, elevated procal. covid ruled out. WBC still high  Plan: escalated abx to cefepime on 9/16, appears to be improving. Cont iv cefepime for now  Active Problems:    Hypothyroid -Established problem. Stable. Plan: cont on synthroid    Hyperlipidemia LDL goal <130 -Established problem. Stable. Plan: Continue present orders/plan. Hyponatremia -Established problem. Improving. 133  Plan: cont fluids. Essential hypertension -Established problem. Elevated 174/73   Plan: stay on same meds. Iv hydralazine prn ordered    Suspected COVID-19 virus infection -Established problem, now resolved. covid swab neg  Plan: take out of droplet plus isolation    YAYO (acute kidney injury) ([facility] [address] -Established problem. Stable. Creat 1.2  Plan: Continue present orders/plan. Acute respiratory failure with hypoxemia ([address] -Established problem. Stable Now on 3L  Plan: cont to tx pna; escalated abx on 9/16    Disp - at least one more day Iv abx. Therapy recommending SNF.  Will hopefully be able to transfer to [facility] Sunday 9/19 or Monday 9/20        Philly Montgomery MD  9/18/2021

## 2021-09-18 NOTE — PROGRESS NOTES
Assessment complete. VSS no SOB or any distress noted. Pt resting in bed. Pain level 5/10, Norco given as ordered along with other night medications. toileting offered and declined. Call light within reach, pt encouraged to call if any needs arise. No further requests at this time. Will continue to monitor.

## 2021-09-19 NOTE — PROGRESS NOTES
875-125 MG per tablet 1 tablet, 1 tablet, Oral, 2 times per day  ipratropium-albuterol (DUONEB) nebulizer solution 1 ampule, 1 ampule, Inhalation, Q4H WA  HYDROcodone-acetaminophen (NORCO) 5-325 MG per tablet 1 tablet, 1 tablet, Oral, Q6H PRN  vitamin B-12 (CYANOCOBALAMIN) tablet 50 mcg, 50 mcg, Oral, Daily  multivitamin 1 tablet, 1 tablet, Oral, Daily  oxybutynin (DITROPAN) tablet 5 mg, 5 mg, Oral, Nightly  lisinopril (PRINIVIL;ZESTRIL) tablet 2.5 mg, 2.5 mg, Oral, Daily  levothyroxine (SYNTHROID) tablet 50 mcg, 50 mcg, Oral, QAM AC  donepezil (ARICEPT) tablet 10 mg, 10 mg, Oral, Nightly  furosemide (LASIX) tablet 20 mg, 20 mg, Oral, Daily  sertraline (ZOLOFT) tablet 100 mg, 100 mg, Oral, Daily  sodium chloride flush 0.9 % injection 5-40 mL, 5-40 mL, IntraVENous, 2 times per day  sodium chloride flush 0.9 % injection 10 mL, 10 mL, IntraVENous, PRN  0.9 % sodium chloride infusion, 25 mL, IntraVENous, PRN  ondansetron (ZOFRAN-ODT) disintegrating tablet 4 mg, 4 mg, Oral, Q8H PRN **OR** ondansetron (ZOFRAN) injection 4 mg, 4 mg, IntraVENous, Q6H PRN  magnesium hydroxide (MILK OF MAGNESIA) 400 MG/5ML suspension 30 mL, 30 mL, Oral, Daily PRN  acetaminophen (TYLENOL) tablet 650 mg, 650 mg, Oral, Q6H PRN **OR** acetaminophen (TYLENOL) suppository 650 mg, 650 mg, Rectal, Q6H PRN  hydrALAZINE (APRESOLINE) injection 10 mg, 10 mg, IntraVENous, Q6H PRN  0.9 % sodium chloride bolus, 500 mL, IntraVENous, PRN  heparin (porcine) injection 5,000 Units, 5,000 Units, SubCUTAneous, 3 times per day  atorvastatin (LIPITOR) tablet 10 mg, 10 mg, Oral, Nightly  metoprolol tartrate (LOPRESSOR) tablet 12.5 mg, 12.5 mg, Oral, BID         Objective:  BP (!) 156/71   Pulse 70   Temp 96.9 °F (36.1 °C) (Temporal)   Resp 20   Ht 5' 6\" (1.676 m)   Wt 100 lb 8.5 oz (45.6 kg)   SpO2 96%   BMI 16.23 kg/m²      Patient Vitals for the past 24 hrs:   BP Temp Temp src Pulse Resp SpO2   09/19/21 1121 -- -- -- -- 20 96 %   09/19/21 0900 (!) 156/71 96.9 °F (36.1 °C) Temporal 70 18 94 %   09/19/21 0415 (!) 177/69 97.4 °F (36.3 °C) Oral 81 18 92 %   09/18/21 2336 123/71 97.5 °F (36.4 °C) Oral 75 18 (!) 88 %   09/18/21 2200 -- -- -- -- -- 92 %   09/18/21 2000 (!) 157/86 97.8 °F (36.6 °C) Temporal 70 18 90 %   09/18/21 1515 (!) 146/83 96.9 °F (36.1 °C) Temporal 79 18 94 %     Patient Vitals for the past 96 hrs (Last 3 readings):   Weight   09/17/21 0829 100 lb 8.5 oz (45.6 kg)   09/16/21 0732 107 lb 5.8 oz (48.7 kg)           Intake/Output Summary (Last 24 hours) at 9/19/2021 1411  Last data filed at 9/19/2021 1201  Gross per 24 hour   Intake 600 ml   Output --   Net 600 ml         Physical Exam:   BP (!) 156/71   Pulse 70   Temp 96.9 °F (36.1 °C) (Temporal)   Resp 20   Ht 5' 6\" (1.676 m)   Wt 100 lb 8.5 oz (45.6 kg)   SpO2 96%   BMI 16.23 kg/m²   General appearance: alert, appears stated age and cooperative  Head: Normocephalic, without obvious abnormality, atraumatic  Lungs: rales bilat   Heart: regular rate and rhythm, S1, S2 normal, no murmur, click, rub or gallop  Abdomen: soft, non-tender; bowel sounds normal; no masses,  no organomegaly  Extremities: extremities normal, atraumatic, no cyanosis or edema    Labs:  Lab Results   Component Value Date    WBC 13.6 (H) 09/19/2021    HGB 9.0 (L) 09/19/2021    HCT 26.4 (L) 09/19/2021     (H) 09/19/2021    CHOL 152 02/03/2017    TRIG 95 02/03/2017    HDL 57 02/03/2017    ALT 6 (L) 09/15/2021    AST 15 09/15/2021     (L) 09/19/2021    K 3.5 09/19/2021    CL 97 (L) 09/19/2021    CREATININE 1.0 09/19/2021    BUN 17 09/19/2021    CO2 23 09/19/2021    TSH 4.62 (H) 10/02/2018    INR 1.04 09/14/2021    LABMICR YES 09/17/2021     Lab Results   Component Value Date    TROPONINI <0.01 09/14/2021       Recent Imaging Results are Reviewed:  XR CHEST PORTABLE    Result Date: 9/14/2021  EXAMINATION: ONE XRAY VIEW OF THE CHEST 9/14/2021 9:49 am COMPARISON: Chest x-ray dated 01/25/2020.  HISTORY: ORDERING SYSTEM PROVIDED HISTORY: chest pain TECHNOLOGIST PROVIDED HISTORY: Reason for exam:->chest pain Reason for Exam: Chest Pain (Arrived by [facility] from [address] [place] that began last night. Denies other complaints. ASA given en route. Supplemental O2 provided in triage; Sat 99% on 2L. ) Acuity: Acute Type of Exam: Initial FINDINGS: HEART/MEDIASTINUM: The cardiomediastinal silhouette is stable. PLEURA/LUNGS: There are increased interstitial markings, which are chronic. There is however increased opacity in the left perihilar region which may reflect pulmonary vascular congestion versus an atypical pneumonia. Left basilar opacification reflecting atelectasis, effusion or airspace disease. There is no appreciable pneumothorax. BONES/SOFT TISSUE: No acute abnormality. Left basilar atelectasis, pleural effusion or airspace disease. Left perihilar airspace disease versus pulmonary vascular congestion. CT CHEST PULMONARY EMBOLISM W CONTRAST    Result Date: 9/14/2021  EXAMINATION: CTA OF THE CHEST 9/14/2021 11:12 am TECHNIQUE: CTA of the chest was performed after the administration of intravenous contrast.  Multiplanar reformatted images are provided for review. MIP images are provided for review. Dose modulation, iterative reconstruction, and/or weight based adjustment of the mA/kV was utilized to reduce the radiation dose to as low as reasonably achievable. COMPARISON: 10/02/2018, chest radiograph done today HISTORY: ORDERING SYSTEM PROVIDED HISTORY: shortness of breath, chest pain, elevated dimer TECHNOLOGIST PROVIDED HISTORY: Reason for exam:->shortness of breath, chest pain, elevated dimer Decision Support Exception - unselect if not a suspected or confirmed emergency medical condition->Emergency Medical Condition (MA) Reason for Exam: SOB, CP, elevated D dimer Acuity: Unknown Type of Exam: Unknown FINDINGS: Pulmonary Arteries: Pulmonary arteries are adequately opacified for evaluation.   No evidence of intraluminal filling defect to suggest pulmonary embolism. Main pulmonary artery is normal in caliber. Mediastinum: No evidence of mediastinal lymphadenopathy. The heart and pericardium demonstrate no acute abnormality. There is no acute abnormality of the thoracic aorta. Lungs/pleura: Left pleural effusion. Left lower lobe consolidation. Central airways are patent. No acute airspace disease in the right lung. Upper Abdomen: Limited images of the upper abdomen are unremarkable. Soft Tissues/Bones: Numerous compression deformities again seen throughout the thoracic spine similar to previous exam done 2018. Negative for acute pulmonary embolism Left pleural effusion with left lower lobe opacity that could represent pneumonia. Assessment and Plan:  Principal Problem:    Pneumonia -Established problem. Stable. Pt with elevated wbc, elevated procal. covid ruled out. WBC still high but improving  Plan: escalated abx to cefepime on 9/16, appears to be improving. Change abx to po augmentin 9/19  Active Problems:    Hypothyroid -Established problem. Stable. Plan: cont on synthroid    Hyperlipidemia LDL goal <130 -Established problem. Stable. Plan: Continue present orders/plan. Hyponatremia -Established problem. Na 130  Plan: cont fluids. Essential hypertension -Established problem. Elevated 156/71  Plan: stay on same meds. Iv hydralazine prn ordered    Suspected COVID-19 virus infection -Established problem, now resolved. covid swab neg  Plan: take out of droplet plus isolation    YAYO (acute kidney injury) ([facility] [address] -Established problem. Stable. Creat 1.0  Plan: Continue present orders/plan. Acute respiratory failure with hypoxemia ([address] -Established problem. Stable Now on 2L  Plan: cont to tx pna; escalated abx on 9/16 to cefepime, now to oral augmentin 9./19    Disp - hopefully be able to transfer to [facility] Monday 9/20 - pt on oral abx.  Rapid covid to be done         Rosana Mock MD  9/19/2021

## 2021-09-19 NOTE — CARE COORDINATION
According to Lestine Cushing, [phone]  at [facility], patient must be off IVABX prior to discharge, with a Rapid CoVid, negative result.

## 2021-09-19 NOTE — PLAN OF CARE
Problem: Pain:  Goal: Pain level will decrease  Description: Pain level will decrease  Note: Pt denies need for intervention. Problem: Falls - Risk of:  Goal: Will remain free from falls  Description: Will remain free from falls  Note: Patient free from harm. ID bands on, bed in lowest position, call light in reach. Patient instructed to call for help if needed. Patient educated on ambulation and safety.

## 2021-09-20 PROBLEM — U07.1 COVID-19: Status: ACTIVE | Noted: 2021-01-01

## 2021-09-20 NOTE — CARE COORDINATION
Shanti Pham from [facility] called back stating that if pt's PCR COVID test is negative, pt can come to their facility for SNF. SW and RN spoke with pt's daughter who reported she does NOT want pt going anywhere other than [facility]. SW attempted to get pt placed to a COVID positive facility but did not receive any call backs yet. Also, earliest transportation for today via stretcher is after 10pm.  Pt wouldn't be able to transfer there until tomorrow anyway. RN will complete PCR test.  Tentative transport has been set with [facility] tomorrow at 5pm assuming the PCR will be negative. If positive, pt will have to either go to a COVID positive facility or home. Discharge Plan:  PCR Pending. If negative, transport is set w/[facility] [address] [facility] tomorrow to [facility]. If positive, pt and dtr will either need to choose COVID SNF or discharge home.     Electronically signed by JOSE CARLOS Bolton, FARHAD on 9/20/2021 at 2:39 PM

## 2021-09-20 NOTE — PLAN OF CARE
Pt unable to maintain oxygen saturation 90% or greater on room air; pt placed on 2L oxygen via nasal cannula to maintain oxygen saturation. Pt complains of fatigue & pain in left rib. Pt remains free from falls & injuries; pt is a contact guard assist with walker. Pt's BLE are weak & unsteady. Pt has no new s/s of impaired skin integrity. Bed alarm on; call light within reach; video monitoring for pt safety. Pt aware of tx & d/c plan.       Problem: Pain:  Goal: Pain level will decrease  Description: Pain level will decrease  Outcome: Ongoing  Goal: Control of acute pain  Description: Control of acute pain  Outcome: Ongoing  Goal: Control of chronic pain  Description: Control of chronic pain  Outcome: Ongoing     Problem: Falls - Risk of:  Goal: Will remain free from falls  Description: Will remain free from falls  Outcome: Ongoing  Goal: Absence of physical injury  Description: Absence of physical injury  Outcome: Ongoing     Problem: Skin Integrity:  Goal: Will show no infection signs and symptoms  Description: Will show no infection signs and symptoms  Outcome: Ongoing  Goal: Absence of new skin breakdown  Description: Absence of new skin breakdown  Outcome: Ongoing

## 2021-09-20 NOTE — CARE COORDINATION
Patient was ready for discharge today and accepted at [facility]. Pt's rapid came back positive. This SNF cannot take COVID + patients. Spoke with Ashely Abad in admissions informing that pt has been fully vaccinated. Ashely Abad checking to see if we did a PCR and that is negative, if pt is able to still come to SNF tomorrow. Waiting on a call back from Ashely Abad to confirm this.     Electronically signed by JOSE CARLOS Herman, FARHAD on 9/20/2021 at 11:15 AM

## 2021-09-20 NOTE — PROGRESS NOTES
Speech Language Pathology  Dysphagia Treatment Note    Name:  Alyx Felix  :   1931  Medical Diagnosis:  YAYO (acute kidney injury) ([facility] [address] [N17.9]  Acute respiratory failure with hypoxemia (HCC) [J96.01]  Pneumonia of left lower lobe due to infectious organism [J18.9]  Chest pain, unspecified type [R07.9]  Treatment Diagnosis: Oropharyngeal Dysphagia  Pain level:  None reported     Diet level prior to treatment:   Regular diet with thin liquids/meds as tolerated    Tolerance of Current Diet Level:   RN continues to report reduced PO intake but no concerns with tolerance. Per chart review, pt was unable to maintain oxygen saturation and is now on 2L O2 via nasal canula. Assessment of Texture Tolerance:  -Impressions:  Pt was on 2L of O2 and laying reclined in chair. She is now in droplet isolation for COVID. She was able to partially reposition herself. She asked 3 times why she was still here and stated that no one has been in to see her. She was oriented to self, city and state. Confused about current location (hospital vs nursing facility). Her lunch meal was present but untouched, per RN, pt declined lunch when delivered to her. Given encouragement and feeding assistance, she accepted a small portion of pudding, regular solid (apple) and thin liquid via straw. Pt demonstrated adequate tolerance of consistencies. Mildly delayed and reduced laryngeal elevation was assessed with thin liquids. She cleared the oral cavity effectively and demonstrated adequate mastication. Recommend to continue with current diet, no consistency restrictions. May benefit from dietary consult pending intake. Diet and Treatment Recommendations 2021:  Regular diet with thin liquids/meds as tolerated    Compensatory strategies: Alternate solids/liquids , Upright as possible with all PO intake , Small bites/sips     Dysphagia Goals:   1.  Pt will functionally tolerate recommended diet with no overt clinical s/s of aspiration (ongoing 9/20/2021)   2. Pt will demonstrate understanding of aspiration risk and precautions via education/demonstration with occasional prompting (ongoing 9/20/2021)   3. Pt will advance to least restrictive diet as indicated  (ongoing 9/20/2021)   4. If clinical s/s of aspiration/penetration continue to be noted, Pt will participate in Modified Barium Swallow Study (ongoing 9/20/2021)   5. Pt will demonstrate improved sensory motor function via targeted exercises (ongoing 9/20/2021)     Plan:   Continued daily Dysphagia treatment with goals per plan of care. 1-2 follow up sessions to ensure diet tolerance. Patient/Family Education:   Education given to the Pt and nurse, who verbalized understanding. Discharge Recommendations:    Pt not likely to require skilled SLP treatment upon d/c. Timed Code Treatment:   0 min    Total Treatment Time:   12 minutes     If patient discharges prior to next session this note will serve as a discharge summary. Signature:   Flaco Craig M.A.  [facility] INDERJIT Kang Pathologist [phone]  9/20/2021 2:42 PM

## 2021-09-20 NOTE — PROGRESS NOTES
Patient was tested this morning for a rapid covid and she came back positive. I called and left a message for Gary Summers of social work because he has transport set up for 12:00 noon.

## 2021-09-20 NOTE — PROGRESS NOTES
Physical Therapy  Facility/Department: [address]  Daily Treatment Note  NAME: Alyx Felix  : 1931  MRN: [id number]    Date of Service: 2021    Discharge Recommendations: Alyx Felix scored a 17/24 on the AM-PAC short mobility form. Current research shows that an AM-PAC score of 17 or less is typically not associated with a discharge to the patient's home setting. Based on the patient's AM-PAC score and their current functional mobility deficits, it is recommended that the patient have 3-5 sessions per week of Physical Therapy at d/ to increase the patient's independence. Please see assessment section for further patient specific details. If patient discharges prior to next session this note will serve as a discharge summary. Please see below for the latest assessment towards goals. Subacute/Skilled Nursing Facility   PT Equipment Recommendations  Equipment Needed: No    Assessment   Body structures, Functions, Activity limitations: Decreased functional mobility ; Decreased ADL status; Decreased strength;Decreased safe awareness;Decreased cognition;Decreased endurance;Decreased balance; Increased pain;Decreased posture  Assessment: Pt continues to present with significant fatigue this date. Pt requesting multiple extended rest breaks throughout treatment session and VC to reapply nasal cannula. CGA-min A for all transfers/bed mobility  and SBA-CGA for ambulation. Pt would contiue to benefit from skilled PT to achieve baseline functioning.   Treatment Diagnosis: impaired functional mobility  Prognosis: Good  Decision Making: Medium Complexity  PT Education: PT Role;Plan of Care;Transfer Training;Gait Training;Functional Mobility Training;General Safety;Orientation  Patient Education: Pt educated on importance of OOB activity, pt re-oreinted to where she is and why she is in hospital.Pt verbalized understanding, will need reinforcement for carryover  Barriers to Learning: cognitive, hearing  REQUIRES PT FOLLOW UP: Yes  Activity Tolerance  Activity Tolerance: Patient limited by fatigue;Patient limited by endurance  Activity Tolerance: Pt requesting multiple extended rest breaks throughout treatment. Requested to get back in bed at EOS however encouraged to sit up in chair for breakfast. Multiple reminders to reapply O2 nasal cannula. Patient Diagnosis(es): The primary encounter diagnosis was Pneumonia of left lower lobe due to infectious organism. Diagnoses of YAYO (acute kidney injury) ([address] and Chest pain, unspecified type were also pertinent to this visit. has a past medical history of Carpal tunnel syndrome of left wrist, Chronic low back pain, Depression, Knik (hard of hearing), Hyperlipidemia LDL goal <130, Hypothyroid, Insomnia, Osteoporosis, and Urinary incontinence. has a past surgical history that includes back surgery and hip pinning (Left, 1/26/2020). Restrictions  Restrictions/Precautions  Restrictions/Precautions: Fall Risk (high fall risk)  Required Braces or Orthoses?: No  Position Activity Restriction  Other position/activity restrictions: This is a  80 y.o. female who presents to the emergency department from [facility] with complaints of chest pain that started last night. Patient also complaining of feeling very fatigued and generalized weakness  Subjective   General  Chart Reviewed: Yes  Response To Previous Treatment: Patient with no complaints from previous session.   Family / Caregiver Present: No  Subjective  Subjective: P reports no pain at this time but seems to grab L side during activity  General Comment  Comments: Pt supine in bed upon arrival. Found with O2 off, O2 at 88%  Pain Screening  Patient Currently in Pain: Denies  Vital Signs  Patient Currently in Pain: Denies       Orientation  Orientation  Orientation Level: Oriented to time;Oriented to person (oreinted to self and month/year)  Cognition   Cognition  Overall Cognitive Status: Exceptions  Arousal/Alertness: Delayed responses to stimuli  Following Commands: Follows one step commands with increased time; Follows one step commands with repetition  Attention Span: Attends with cues to redirect; Difficulty attending to directions  Memory: Decreased recall of recent events  Safety Judgement: Decreased awareness of need for safety  Problem Solving: Decreased awareness of errors  Insights: Decreased awareness of deficits  Initiation: Requires cues for some  Sequencing: Requires cues for some  Objective   Bed mobility  Supine to Sit: Minimal assistance (Pt requesting a \"slight push. \" HHA, grab bars, HOB elevated)  Sit to Supine: Unable to assess (in chair EOS)  Scooting: Contact guard assistance  Transfers  Sit to Stand: Contact guard assistance  Stand to sit: Contact guard assistance;Minimal Assistance (CGA stand>sit bed/chair, min A stand >toilet)  Bed to Chair: Stand by assistance  Comment: VCs for hand placment, difficulty following VCs. Ambulation  Ambulation?: Yes  Ambulation 1  Surface: level tile  Device: Rolling Walker  Assistance: Contact guard assistance;Stand by assistance  Quality of Gait: forward flexed posture, VC to stand up straight. decreased mira  Gait Deviations: Slow Mira;Decreased step height;Decreased step length  Distance: 10ft+10ft+10ft  Comments: Needed encouragement d/t feeling fatigued. Pt requesting multiple extended rest breaks between ambulation trials.   Stairs/Curb  Stairs?: No     Balance  Posture: Fair (thoracic kyphosis)  Sitting - Static: Good;-  Sitting - Dynamic: Good;- (Sat at toilet ~8 mins, able to attempt perianal hygeine in sitting, therapist assist when standing)  Standing - Static: Fair (with RW)  Standing - Dynamic: Fair;- (stood to wash hands at sink ~2 mins, forward lean onto B elbows)                        AM-PAC Score  AM-PAC Inpatient Mobility Raw Score : 17 (09/20/21 0919)  AM-PAC Inpatient T-Scale Score : 42.13 (09/20/21 0919)  Mobility Inpatient CMS 0-100% Score: 50.57 (09/20/21 0919)  Mobility Inpatient CMS G-Code Modifier : CK (09/20/21 0919)          Goals  Short term goals  Time Frame for Short term goals: discharge  Short term goal 1: bed mobility with CGA - Goal met 9/16  Short term goal 2: sit to/from stand with CGA- Goal met 9/16  Short term goal 3: Pt to amb with RW and CGA 30 ft. Short term goal 4: Pt to perform bed mob with supervision. Short term goal 5: Pt to perform transfers with supervision. NO GOALS MET THIS DATE  Plan    Plan  Times per week: 3-5x  Times per day: Daily  Current Treatment Recommendations: Strengthening, Balance Training, Functional Mobility Training, Gait Training, Transfer Training, Safety Education & Training, Endurance Training, Pain Management, Home Exercise Program, Patient/Caregiver Education & Training  Safety Devices  Type of devices:  All fall risk precautions in place, Call light within reach, Chair alarm in place, Gait belt, Nurse notified, Cy [place] in use, Patient at risk for falls, Left in chair  Restraints  Initially in place: No     Therapy Time   Individual Concurrent Group Co-treatment   Time In 0820         Time Out 0858         Minutes 38           Timed Code Treatment Minutes: 38 minutes   Total Treatment Minutes:  38 minutes       Yeyo Trevizo, [facility] Savannah Whitaker, [address]

## 2021-09-20 NOTE — PROGRESS NOTES
Spoke with Kraig Patricio from case management and he is going to contact [address] [place] [address] and make sure they will accept her with a positive covid result. Patient refused all of her morning medication.

## 2021-09-20 NOTE — PROGRESS NOTES
Spoke with patient's daughter Aris Nissen [phone] and she said they really want her mother to only go to [facility]. I am sending the PCR as we speak.

## 2021-09-20 NOTE — DISCHARGE SUMMARY
[facility] -- Physician Discharge Summary     Benitez Felix  7/21/1931  MRN: [id number]    Admit Date: 9/14/2021  Discharge Date: No discharge date for patient encounter. Attending MD: Kandi Mai MD  Discharging MD: Kandi Mai MD  PCP: No primary care provider on file. No primary physician on file. None    Admission Diagnosis: YAYO (acute kidney injury) ([facility] [address] [N17.9]  Acute respiratory failure with hypoxemia ([facility] [address] [J96.01]  Pneumonia of left lower lobe due to infectious organism [J18.9]  Chest pain, unspecified type [R07.9]  DISCHARGE DIAGNOSIS: same    Full Hospital Problem List:  Active Hospital Problems    Diagnosis Date Noted    Suspected COVID-19 virus infection [Z20.822] 09/14/2021    YAYO (acute kidney injury) ([facility] [address] [N17.9] 09/14/2021    Acute respiratory failure with hypoxemia ([facility] [address] [J96.01] 09/14/2021    Essential hypertension [I10] 12/18/2018    Hyponatremia [E87.1] 10/02/2018    Pneumonia [J18.9] 10/02/2018    Hyperlipidemia LDL goal <130 [E78.5]     Hypothyroid [E03.9]            Hospital Course:  80 y. o. female who presents to the emergency department from [facility] complaints of chest pain that started last night.  Patient also complaining of feeling very fatigued and generalized weakness, states that normally by this time of day she would be up and perky.  Denies any urinary frequency, urgency, dysuria or hematuria.  Daughter who is at bedside reports that yesterday patient did have a mild cough, she is vaccinated for COVID-19. Rosalinda Quiñones does reside at [facility]. Rosalinda Quiñones cannot really describe the pain and the pain does not radiate but she states that the pain is 5 out of 10 in severity.  Apparently she is not on any home oxygen but was at 88% upon squad arrival. Rosalinda Quiñones was placed on 4 L of oxygen initially, currently at 2 L of oxygen and 97%.  She does have slight increase in respirations.  She denies any associated diaphoresis or left arm pain, numbness, tingling or weakness     Imaging in the er is reviewed on computer       Impression:         Negative for acute pulmonary embolism     Left pleural effusion with left lower lobe opacity that could represent   pneumonia.          Pt does come from [facility] living arrangement  covid swab sent in ER  Results pending at time of admissions  This test comes back negative     Pt placed on empiric iv abx for bacterial pna, iv cefepime    She improves on iv abx  By time of d/c, breathing easily on 2L o2  Converted to po augmentin    To dischrge to SNF for continued abx/oxygen and therapy    Consults made during Hospitalization:  IP CONSULT TO INTERNAL MEDICINE    Treatment team at time of Discharge: Treatment Team: Attending Provider: Jeanette Rogers MD; Consulting Physician: Jeanette Rogers MD; Registered Nurse: Eliza Melvin RN; Respiratory Therapist (Day): Bria Malin RCP; Registered Nurse: Linda Wasserman RN    Imaging Results:  XR CHEST (2 VW)    Result Date: 9/18/2021  EXAMINATION: TWO XRAY VIEWS OF THE CHEST 9/18/2021 2:41 pm COMPARISON: 09/16/2021 radiograph HISTORY: ORDERING SYSTEM PROVIDED HISTORY: cough TECHNOLOGIST PROVIDED HISTORY: Reason for exam:->cough Reason for Exam: cough FINDINGS: The heart is enlarged. Mediastinum is normal.  Moderate perihilar opacities are stable centrally. Small left-sided pleural effusion, slightly decreased in volume. Asymmetric ground-glass opacification in the left greater than right lung. Underlying pattern of COPD. Several compression fractures in the visualized thoracic and lumbar spine with at least 1 prior treated level of augmentation, stable. Slightly decreased volume of left pleural effusion with perihilar and bilateral ground-glass opacification that is otherwise relatively stable.      XR CHEST (2 VW)    Result Date: 9/16/2021  EXAMINATION: TWO XRAY VIEWS OF THE CHEST 9/16/2021 5:16 pm COMPARISON: 09/15/2021 HISTORY: ORDERING SYSTEM PROVIDED HISTORY: cough TECHNOLOGIST PROVIDED HISTORY: Reason for exam:->cough Reason for Exam: cough Acuity: Unknown Type of Exam: Unknown FINDINGS: Left-sided pleuroparenchymal disease overall similar appearance to the previous exam.  Right middle lobe airspace disease is also similar. No significant change     XR CHEST (2 VW)    Result Date: 9/15/2021  EXAMINATION: TWO XRAY VIEWS OF THE CHEST 9/15/2021 11:27 am COMPARISON: Chest radiograph performed 09/14/2021. HISTORY: ORDERING SYSTEM PROVIDED HISTORY: cough TECHNOLOGIST PROVIDED HISTORY: Reason for exam:->cough Reason for Exam: Cough Acuity: Unknown Type of Exam: Unknown FINDINGS: There are bibasilar effusions. There is bilateral congestion. There is no pneumothorax. The mediastinal structures are unremarkable. The upper abdomen unremarkable. The extrathoracic soft tissues are unremarkable. Bilateral pulmonary congestion with bibasilar effusions. XR CHEST PORTABLE    Result Date: 9/14/2021  EXAMINATION: ONE XRAY VIEW OF THE CHEST 9/14/2021 9:49 am COMPARISON: Chest x-ray dated 01/25/2020. HISTORY: ORDERING SYSTEM PROVIDED HISTORY: chest pain TECHNOLOGIST PROVIDED HISTORY: Reason for exam:->chest pain Reason for Exam: Chest Pain (Arrived by [facility] from [address] [place] that began last night. Denies other complaints. ASA given en route. Supplemental O2 provided in triage; Sat 99% on 2L. ) Acuity: Acute Type of Exam: Initial FINDINGS: HEART/MEDIASTINUM: The cardiomediastinal silhouette is stable. PLEURA/LUNGS: There are increased interstitial markings, which are chronic. There is however increased opacity in the left perihilar region which may reflect pulmonary vascular congestion versus an atypical pneumonia. Left basilar opacification reflecting atelectasis, effusion or airspace disease. There is no appreciable pneumothorax. BONES/SOFT TISSUE: No acute abnormality.      Left basilar atelectasis, pleural effusion or airspace Ht 5' 6\" (1.676 m)   Wt 100 lb 8.5 oz (45.6 kg)   SpO2 97%   BMI 16.23 kg/m²   General appearance: alert, appears stated age and cooperative  Head: Normocephalic, without obvious abnormality, atraumatic  Lungs: crackles in bases  Heart: regular rate and rhythm, S1, S2 normal, no murmur, click, rub or gallop  Abdomen: soft, non-tender; bowel sounds normal; no masses,  no organomegaly  Extremities: extremities normal, atraumatic, no cyanosis or edema    Disposition: [facility]    Condition: stable    Discharge Medications:   Aron Felix [place] [address] Medication Instructions ZTF:[id number]    Printed on:09/20/21 2658   Medication Information                      amitriptyline (ELAVIL) 10 MG tablet  PRN             amoxicillin-clavulanate (AUGMENTIN) 500-125 MG per tablet  Take 1 tablet by mouth every 12 hours for 10 days             aspirin 325 MG EC tablet  Take 1 tablet by mouth daily             Calcium Carbonate-Vitamin D (CALTRATE 600+D PO)  Take 1 tablet by mouth daily              donepezil (ARICEPT) 5 MG tablet  Take 1 tablet by mouth nightly             furosemide (LASIX) 20 MG tablet  Take 20 mg by mouth daily              KRILL OIL PO  Take 1 tablet by mouth daily             levothyroxine (SYNTHROID) 88 MCG tablet  Take 88 mcg by mouth Daily             lisinopril (PRINIVIL;ZESTRIL) 5 MG tablet  Take 0.5 tablets by mouth daily             metoprolol tartrate (LOPRESSOR) 25 MG tablet  Take 0.5 tablets by mouth 2 times daily             multivitamin-iron-minerals-folic acid (CENTRUM) chewable tablet  Take 1 tablet by mouth daily             oxybutynin (DITROPAN) 5 MG tablet  TAKE ONE TABLET BY MOUTH TWICE A DAY             sertraline (ZOLOFT) 25 MG tablet  Take 25 mg by mouth daily             simvastatin (ZOCOR) 20 MG tablet  TAKE ONE- HALF (1/2) TABLET BY MOUTH ONCE NIGHTLY             vitamin B-12 (CYANOCOBALAMIN) 100 MCG tablet  Take 50 mcg by mouth daily             zoledronic acid (RECLAST) 5 MG/100ML SOLN  Infuse 100 mLs intravenously once for 1 dose                 Allergies: Allergies   Allergen Reactions    Prozac [Fluoxetine Hcl] Nausea Only       Follow up Instructions: Follow-up with PCP: No primary care provider on file. in 2 wk .       Total time spent on day of discharge including face-to-face visit, examination, documentation, counseling, preparation of discharge plans and followup, and discharge medicine reconciliation and presciptions is 33 minutes    Signed:  Philly Montgomery MD  9/20/2021

## 2021-09-21 NOTE — PROGRESS NOTES
Pt still feels isolated. Pt felt slight improvement following gown/linen change, bath & oral hygiene. Pt did take all medications & understands reasons for medications. Pt understands reasons for fall precautions. Pt ambulated well to bathroom with return to bed. SBA. Vitals:    09/21/21 0115   BP: (!) 143/41   Pulse: 81   Resp: 18   Temp: 98.2 °F (36.8 °C)   SpO2: 96%     Pt continues to be able to express needs and expressed desire to be discharged.

## 2021-09-21 NOTE — PROGRESS NOTES
Data- discharge order received, pt (appointed legal authority) verbalized agreement to discharge, disposition to [address] reviewed and signed by physician. Action- AVS prepared, ARNOLD completed/ reported faxed by case management/. Discharge instruction summary: Diet- General, Activity- As tolerated, immunizations reviewed and complete, medications prescriptions to be filled at receiving facility except for the controlled prescriptions to be sent: none, Transfer code status: DNR-CC, LDAs to remain with discharge: none. DME used: oxygen. Response- Bedside RN to call report to receiving facility. Pt belongings gathered, peripheral IV and cardiac monitoring removed. Disposition to Discharged via cart/stretcher and via ambulance to skilled nursing by EMS transportation, no complications reported. 1. WEIGHT: Admit Weight: 100 lb (45.4 kg) (09/14/21 0927)        Today  Weight: 100 lb 1.4 oz (45.4 kg) (09/21/21 0730)       2.  O2 SAT.: SpO2: 96 % (09/21/21 1643)

## 2021-09-21 NOTE — PROGRESS NOTES
Physical Therapy  Facility/Department: [address]  Daily Treatment Note  NAME: Rohan Felix  : 1931  MRN: [id number]    Date of Service: 2021    Discharge Recommendations:  Rohan Felix scored a 17/24 on the AM-PAC short mobility form. Current research shows that an AM-PAC score of 17 or less is typically not associated with a discharge to the patient's home setting. Based on the patient's AM-PAC score and their current functional mobility deficits, it is recommended that the patient have 3-5 sessions per week of Physical Therapy at d/c to increase the patient's independence. Please see assessment section for further patient specific details. If patient discharges prior to next session this note will serve as a discharge summary. Please see below for the latest assessment towards goals. Subacute/Skilled Nursing Facility   PT Equipment Recommendations  Equipment Needed: No    Assessment   Body structures, Functions, Activity limitations: Decreased functional mobility ; Decreased ADL status; Decreased strength;Decreased safe awareness;Decreased cognition;Decreased endurance;Decreased balance; Increased pain;Decreased posture  Assessment: Pt continues to be fatigued but also seems depressed about situation. Encouragement for mobility provided. Limited endurance for short distances of ambulation only. Continued skilled PT to promote return towards PLOF. Treatment Diagnosis: impaired functional mobility  Prognosis: Good  PT Education: PT Role;Plan of Care;Transfer Training;Gait Training;Functional Mobility Training;General Safety;Orientation  Patient Education: needs reinforcement  Barriers to Learning: cognitive, hearing  REQUIRES PT FOLLOW UP: Yes  Activity Tolerance  Activity Tolerance: Patient limited by endurance; Patient limited by fatigue     Patient Diagnosis(es): The primary encounter diagnosis was Pneumonia of left lower lobe due to infectious organism.  Diagnoses of YAYO (acute kidney injury) ([facility] [address] and Chest pain, unspecified type were also pertinent to this visit. has a past medical history of Carpal tunnel syndrome of left wrist, Chronic low back pain, Depression, [employer] [place] [place] (hard of hearing), Hyperlipidemia LDL goal <130, Hypothyroid, Insomnia, Osteoporosis, and Urinary incontinence. has a past surgical history that includes back surgery and hip pinning (Left, 1/26/2020). Restrictions  Restrictions/Precautions  Restrictions/Precautions: Fall Risk (high fall risk)  Required Braces or Orthoses?: No  Position Activity Restriction  Other position/activity restrictions: This is a  80 y.o. female who presents to the emergency department from [facility] with complaints of chest pain that started last night. Patient also complaining of feeling very fatigued and generalized weakness  Subjective   General  Chart Reviewed: Yes  Response To Previous Treatment: Patient with no complaints from previous session. Family / Caregiver Present: No  Subjective  Subjective: feeling down and lonely, agreeable to therapy session , states she is tired  General Comment  Comments: supine in bed at arrival, Found with O2 off, saturation 94%. Pain Screening  Patient Currently in Pain: Denies  Vital Signs  Patient Currently in Pain: Denies       Orientation     Cognition      Objective   Bed mobility  Supine to Sit: Minimal assistance  Sit to Supine: Minimal assistance  Scooting: Contact guard assistance  Comment: Increased time to complete mobility, HOB elevated, use of HHA for trunk  Transfers  Sit to Stand: Minimal Assistance  Stand to sit: Contact guard assistance  Comment: Min A for transfer from EOB and bench by window. VC for hand placement. Ambulation  Ambulation?: Yes  Ambulation 1  Surface: level tile  Device: Rolling Walker  Assistance: Contact guard assistance  Quality of Gait: forward flexed posture, VC to stand up straight.  decreased mira  Gait Deviations: Slow Mira;Decreased step height;Decreased step length  Distance: 10 ft to window, 30 ft with 1 standing rest break and increased trunk flexion  Comments: Distances limited by fatigue. Stairs/Curb  Stairs?: No     Balance  Posture: Fair  Sitting - Static: Good;-  Sitting - Dynamic: Good;-  Standing - Static: Fair  Standing - Dynamic: Fair;-                           G-Code     OutComes Score                                                     AM-PAC Score  AM-PAC Inpatient Mobility Raw Score : 17 (09/21/21 1525)  AM-PAC Inpatient T-Scale Score : 42.13 (09/21/21 1525)  Mobility Inpatient CMS 0-100% Score: 50.57 (09/21/21 1525)  Mobility Inpatient CMS G-Code Modifier : CK (09/21/21 1525)          Goals  Short term goals  Time Frame for Short term goals: discharge  Short term goal 1: bed mobility with CGA - Goal met 9/16  Short term goal 2: sit to/from stand with CGA- Goal met 9/16  Short term goal 3: Pt to amb with RW and CGA 30 ft.---met 9/21  Short term goal 4: Pt to perform bed mob with supervision. Short term goal 5: Pt to perform transfers with supervision. Plan    Plan  Times per week: 3-5x  Times per day: Daily  Current Treatment Recommendations: Strengthening, Balance Training, Functional Mobility Training, Gait Training, Transfer Training, Safety Education & Training, Endurance Training, Pain Management, Home Exercise Program, Patient/Caregiver Education & Training  Safety Devices  Type of devices:  All fall risk precautions in place, Call light within reach, Gait belt, Nurse notified, Eboni Deeds in use, Patient at risk for falls, Bed alarm in place, Left in bed  Restraints  Initially in place: No     Therapy Time   Individual Concurrent Group Co-treatment   Time In 1502         Time Out 1525         Minutes 23         Timed Code Treatment Minutes: [address], [id number]

## 2021-09-21 NOTE — DISCHARGE SUMMARY
[facility] -- Physician Discharge Summary     Jessica Felix  7/21/1931  MRN: [id number]    Admit Date: 9/14/2021  Discharge Date: No discharge date for patient encounter. Attending MD: Odin Partida MD  Discharging MD: Odin Partida MD  PCP: No primary care provider on file. No primary physician on file. None    Admission Diagnosis: YAYO (acute kidney injury) ([facility] [N17.9]  Acute respiratory failure with hypoxemia ([facility] [J96.01]  Pneumonia of left lower lobe due to infectious organism [J18.9]  Chest pain, unspecified type [R07.9]  DISCHARGE DIAGNOSIS: same    Full Hospital Problem List:  Active Hospital Problems    Diagnosis Date Noted    COVID-19 [U07.1] 09/20/2021    Suspected COVID-19 virus infection [Z20.822] 09/14/2021    YAYO (acute kidney injury) ([facility] [address] [N17.9] 09/14/2021    Acute respiratory failure with hypoxemia ([facility] [facility] [J96.01] 09/14/2021    Essential hypertension [I10] 12/18/2018    Hyponatremia [E87.1] 10/02/2018    Pneumonia [J18.9] 10/02/2018    Hyperlipidemia LDL goal <130 [E78.5]     Hypothyroid [E03.9]            Hospital Course:  80 y. o. female who presents to the emergency department from [facility] complaints of chest pain that started last night.  Patient also complaining of feeling very fatigued and generalized weakness, states that normally by this time of day she would be up and perky.  Denies any urinary frequency, urgency, dysuria or hematuria.  Daughter who is at bedside reports that yesterday patient did have a mild cough, she is vaccinated for COVID-19. Lowell Couch does reside at [facility]. Lowell Couch cannot really describe the pain and the pain does not radiate but she states that the pain is 5 out of 10 in severity.  Apparently she is not on any home oxygen but was at 88% upon squad arrival. Lowell Couch was placed on 4 L of oxygen initially, currently at 2 L of oxygen and 97%.  She does have slight increase in respirations.  She denies any associated diaphoresis or left arm pain, numbness, tingling or weakness     Imaging in the er is reviewed on computer       Impression:         Negative for acute pulmonary embolism     Left pleural effusion with left lower lobe opacity that could represent   pneumonia.          Pt does come from [facility] living arrangement  covid swab sent in ER  Results pending at time of admissions  This test comes back negative     Pt placed on empiric iv abx for bacterial pna, iv cefepime    She improves on iv abx  By time of d/c, breathing easily on 2L o2  Converted to po augmentin    To dischrge to SNF for continued abx/oxygen and therapy  D/c initially placed 9/20 but this was postponed as rapid covid test was positive  PCR covid was sent to confirm, and this study is negative    Pt then cleared for SNF 9/21  D/c order placed again    Consults made during Hospitalization:  IP CONSULT TO INTERNAL MEDICINE    Treatment team at time of Discharge: Treatment Team: Attending Provider: Marley Chambers MD; Consulting Physician: Marley Chambers MD; Respiratory Therapist (Day): Stacey Gallagher RCP; Registered Nurse: Hebert Schwab, RN; Registered Nurse: Rochelle Steel RN; Utilization Reviewer: Saima Jane RN; Physical Therapist: Marsha Zaman PT    Imaging Results:  XR CHEST (2 VW)    Result Date: 9/18/2021  EXAMINATION: TWO XRAY VIEWS OF THE CHEST 9/18/2021 2:41 pm COMPARISON: 09/16/2021 radiograph HISTORY: ORDERING SYSTEM PROVIDED HISTORY: cough TECHNOLOGIST PROVIDED HISTORY: Reason for exam:->cough Reason for Exam: cough FINDINGS: The heart is enlarged. Mediastinum is normal.  Moderate perihilar opacities are stable centrally. Small left-sided pleural effusion, slightly decreased in volume. Asymmetric ground-glass opacification in the left greater than right lung. Underlying pattern of COPD.   Several compression fractures in the visualized thoracic and lumbar spine with at least 1 prior treated level of augmentation, stable. Slightly decreased volume of left pleural effusion with perihilar and bilateral ground-glass opacification that is otherwise relatively stable. XR CHEST (2 VW)    Result Date: 9/16/2021  EXAMINATION: TWO XRAY VIEWS OF THE CHEST 9/16/2021 5:16 pm COMPARISON: 09/15/2021 HISTORY: ORDERING SYSTEM PROVIDED HISTORY: cough TECHNOLOGIST PROVIDED HISTORY: Reason for exam:->cough Reason for Exam: cough Acuity: Unknown Type of Exam: Unknown FINDINGS: Left-sided pleuroparenchymal disease overall similar appearance to the previous exam.  Right middle lobe airspace disease is also similar. No significant change     XR CHEST (2 VW)    Result Date: 9/15/2021  EXAMINATION: TWO XRAY VIEWS OF THE CHEST 9/15/2021 11:27 am COMPARISON: Chest radiograph performed 09/14/2021. HISTORY: ORDERING SYSTEM PROVIDED HISTORY: cough TECHNOLOGIST PROVIDED HISTORY: Reason for exam:->cough Reason for Exam: Cough Acuity: Unknown Type of Exam: Unknown FINDINGS: There are bibasilar effusions. There is bilateral congestion. There is no pneumothorax. The mediastinal structures are unremarkable. The upper abdomen unremarkable. The extrathoracic soft tissues are unremarkable. Bilateral pulmonary congestion with bibasilar effusions. XR CHEST PORTABLE    Result Date: 9/14/2021  EXAMINATION: ONE XRAY VIEW OF THE CHEST 9/14/2021 9:49 am COMPARISON: Chest x-ray dated 01/25/2020. HISTORY: ORDERING SYSTEM PROVIDED HISTORY: chest pain TECHNOLOGIST PROVIDED HISTORY: Reason for exam:->chest pain Reason for Exam: Chest Pain (Arrived by [facility] from [address] [place] that began last night. Denies other complaints. ASA given en route. Supplemental O2 provided in triage; Sat 99% on 2L. ) Acuity: Acute Type of Exam: Initial FINDINGS: HEART/MEDIASTINUM: The cardiomediastinal silhouette is stable. PLEURA/LUNGS: There are increased interstitial markings, which are chronic.  There is however increased opacity in the left perihilar region which may reflect pulmonary vascular congestion versus an atypical pneumonia. Left basilar opacification reflecting atelectasis, effusion or airspace disease. There is no appreciable pneumothorax. BONES/SOFT TISSUE: No acute abnormality. Left basilar atelectasis, pleural effusion or airspace disease. Left perihilar airspace disease versus pulmonary vascular congestion. CT CHEST PULMONARY EMBOLISM W CONTRAST    Result Date: 9/14/2021  EXAMINATION: CTA OF THE CHEST 9/14/2021 11:12 am TECHNIQUE: CTA of the chest was performed after the administration of intravenous contrast.  Multiplanar reformatted images are provided for review. MIP images are provided for review. Dose modulation, iterative reconstruction, and/or weight based adjustment of the mA/kV was utilized to reduce the radiation dose to as low as reasonably achievable. COMPARISON: 10/02/2018, chest radiograph done today HISTORY: ORDERING SYSTEM PROVIDED HISTORY: shortness of breath, chest pain, elevated dimer TECHNOLOGIST PROVIDED HISTORY: Reason for exam:->shortness of breath, chest pain, elevated dimer Decision Support Exception - unselect if not a suspected or confirmed emergency medical condition->Emergency Medical Condition (MA) Reason for Exam: SOB, CP, elevated D dimer Acuity: Unknown Type of Exam: Unknown FINDINGS: Pulmonary Arteries: Pulmonary arteries are adequately opacified for evaluation. No evidence of intraluminal filling defect to suggest pulmonary embolism. Main pulmonary artery is normal in caliber. Mediastinum: No evidence of mediastinal lymphadenopathy. The heart and pericardium demonstrate no acute abnormality. There is no acute abnormality of the thoracic aorta. Lungs/pleura: Left pleural effusion. Left lower lobe consolidation. Central airways are patent. No acute airspace disease in the right lung. Upper Abdomen: Limited images of the upper abdomen are unremarkable.  Soft Tissues/Bones: Numerous compression deformities again seen throughout the thoracic spine similar to previous exam done 2018. Negative for acute pulmonary embolism Left pleural effusion with left lower lobe opacity that could represent pneumonia.          Discharge Exam:  BP (!) 158/77   Pulse 106   Temp 98.3 °F (36.8 °C) (Oral)   Resp 18   Ht 5' 6\" (1.676 m)   Wt 100 lb 1.4 oz (45.4 kg)   SpO2 96%   BMI 16.15 kg/m²   General appearance: alert, appears stated age and cooperative  Head: Normocephalic, without obvious abnormality, atraumatic  Lungs: crackles in bases  Heart: regular rate and rhythm, S1, S2 normal, no murmur, click, rub or gallop  Abdomen: soft, non-tender; bowel sounds normal; no masses,  no organomegaly  Extremities: extremities normal, atraumatic, no cyanosis or edema    Disposition: SNF    Condition: stable    Discharge Medications:   [place] [address] Medication Instructions L:[id number]    Printed on:09/21/21 5436   Medication Information                      amitriptyline (ELAVIL) 10 MG tablet  PRN             amoxicillin-clavulanate (AUGMENTIN) 500-125 MG per tablet  Take 1 tablet by mouth every 12 hours for 10 days             aspirin 325 MG EC tablet  Take 1 tablet by mouth daily             Calcium Carbonate-Vitamin D (CALTRATE 600+D PO)  Take 1 tablet by mouth daily              donepezil (ARICEPT) 5 MG tablet  Take 1 tablet by mouth nightly             furosemide (LASIX) 20 MG tablet  Take 20 mg by mouth daily              KRILL OIL PO  Take 1 tablet by mouth daily             levothyroxine (SYNTHROID) 88 MCG tablet  Take 88 mcg by mouth Daily             lisinopril (PRINIVIL;ZESTRIL) 5 MG tablet  Take 0.5 tablets by mouth daily             metoprolol tartrate (LOPRESSOR) 25 MG tablet  Take 0.5 tablets by mouth 2 times daily             multivitamin-iron-minerals-folic acid (CENTRUM) chewable tablet  Take 1 tablet by mouth daily             oxybutynin (DITROPAN) 5 MG tablet  TAKE ONE TABLET BY MOUTH

## 2021-09-21 NOTE — PROGRESS NOTES
Progress Note - Dr. Gabby Ríos - Internal Medicine  PCP: No primary care provider on file. No primary physician on file. None    Hospital Day: 7  Code Status: DNR-CC  Current Diet: ADULT DIET; Regular        CC: follow up on medical issues    Subjective:   Chloe Felix is a 80 y.o. female. She denies new problems    Discharge placed 9/20  Rapid covid done per SNF request  This is positive - thus transfer postponed  PCR test has been sent to confirm  Pt in droplet plus isolation    She denies chest pain, complains of shortness of breath, denies nausea,  denies emesis. 10 system Review of Systems is reviewed with patient, and pertinent positives are noted in HPI above . Otherwise, Review of systems is negative. I have reviewed the patient's medical and social history in detail and updated the computerized patient record. To recap: She  has a past medical history of Carpal tunnel syndrome of left wrist, Chronic low back pain, Depression, [place] (hard of hearing), Hyperlipidemia LDL goal <130, Hypothyroid, Insomnia, Osteoporosis, and Urinary incontinence. . She  has a past surgical history that includes back surgery and hip pinning (Left, 1/26/2020). . She  reports that she has quit smoking. Her smoking use included cigarettes. She has a 2.50 pack-year smoking history. She has never used smokeless tobacco. She reports current alcohol use of about 1.0 standard drinks of alcohol per week. She reports that she does not use drugs. .        Active Hospital Problems    Diagnosis Date Noted    COVID-19 [U07.1] 09/20/2021    Suspected COVID-19 virus infection [Z20.822] 09/14/2021    YAYO (acute kidney injury) ([facility] [address] [N17.9] 09/14/2021    Acute respiratory failure with hypoxemia ([facility] [address] [J96.01] 09/14/2021    Essential hypertension [I10] 12/18/2018    Hyponatremia [E87.1] 10/02/2018    Pneumonia [J18.9] 10/02/2018    Hyperlipidemia LDL goal <130 [E78.5]     Hypothyroid [E03.9]        Current Facility-Administered Medications: albuterol sulfate  (90 Base) MCG/ACT inhaler 2 puff, 2 puff, Inhalation, 4x daily  ipratropium (ATROVENT HFA) 17 MCG/ACT inhaler 2 puff, 2 puff, Inhalation, 4x daily  amoxicillin-clavulanate (AUGMENTIN) 500-125 MG per tablet 1 tablet, 1 tablet, Oral, 2 times per day  HYDROcodone-acetaminophen (NORCO) 5-325 MG per tablet 1 tablet, 1 tablet, Oral, Q6H PRN  vitamin B-12 (CYANOCOBALAMIN) tablet 50 mcg, 50 mcg, Oral, Daily  multivitamin 1 tablet, 1 tablet, Oral, Daily  oxybutynin (DITROPAN) tablet 5 mg, 5 mg, Oral, Nightly  lisinopril (PRINIVIL;ZESTRIL) tablet 2.5 mg, 2.5 mg, Oral, Daily  levothyroxine (SYNTHROID) tablet 50 mcg, 50 mcg, Oral, QAM AC  donepezil (ARICEPT) tablet 10 mg, 10 mg, Oral, Nightly  furosemide (LASIX) tablet 20 mg, 20 mg, Oral, Daily  sertraline (ZOLOFT) tablet 100 mg, 100 mg, Oral, Daily  sodium chloride flush 0.9 % injection 5-40 mL, 5-40 mL, IntraVENous, 2 times per day  sodium chloride flush 0.9 % injection 10 mL, 10 mL, IntraVENous, PRN  0.9 % sodium chloride infusion, 25 mL, IntraVENous, PRN  ondansetron (ZOFRAN-ODT) disintegrating tablet 4 mg, 4 mg, Oral, Q8H PRN **OR** ondansetron (ZOFRAN) injection 4 mg, 4 mg, IntraVENous, Q6H PRN  magnesium hydroxide (MILK OF MAGNESIA) 400 MG/5ML suspension 30 mL, 30 mL, Oral, Daily PRN  acetaminophen (TYLENOL) tablet 650 mg, 650 mg, Oral, Q6H PRN **OR** acetaminophen (TYLENOL) suppository 650 mg, 650 mg, Rectal, Q6H PRN  hydrALAZINE (APRESOLINE) injection 10 mg, 10 mg, IntraVENous, Q6H PRN  0.9 % sodium chloride bolus, 500 mL, IntraVENous, PRN  heparin (porcine) injection 5,000 Units, 5,000 Units, SubCUTAneous, 3 times per day  atorvastatin (LIPITOR) tablet 10 mg, 10 mg, Oral, Nightly  metoprolol tartrate (LOPRESSOR) tablet 12.5 mg, 12.5 mg, Oral, BID         Objective:  BP (!) 152/77   Pulse 91   Temp 98.3 °F (36.8 °C) (Temporal)   Resp 18   Ht 5' 6\" (1.676 m)   Wt 100 lb 1.4 oz (45.4 kg)   SpO2 90%   BMI 16.15 kg/m² Patient Vitals for the past 24 hrs:   BP Temp Temp src Pulse Resp SpO2 Weight   09/21/21 0730 -- -- -- -- -- -- 100 lb 1.4 oz (45.4 kg)   09/21/21 0600 (!) 152/77 98.3 °F (36.8 °C) Temporal 91 18 90 % --   09/21/21 0115 (!) 143/41 98.2 °F (36.8 °C) Oral 81 18 96 % --   09/20/21 2208 -- -- -- -- 18 96 % --   09/20/21 2000 127/63 98.2 °F (36.8 °C) Oral 104 18 97 % --   09/20/21 1704 -- -- -- -- -- 98 % --   09/20/21 1605 134/74 98.2 °F (36.8 °C) Oral 98 17 97 % --   09/20/21 1015 (!) 159/73 97.4 °F (36.3 °C) Temporal 100 18 95 % --     Patient Vitals for the past 96 hrs (Last 3 readings):   Weight   09/21/21 0730 100 lb 1.4 oz (45.4 kg)   09/17/21 0829 100 lb 8.5 oz (45.6 kg)           Intake/Output Summary (Last 24 hours) at 9/21/2021 0816  Last data filed at 9/20/2021 1605  Gross per 24 hour   Intake 100 ml   Output --   Net 100 ml         Physical Exam:   BP (!) 152/77   Pulse 91   Temp 98.3 °F (36.8 °C) (Temporal)   Resp 18   Ht 5' 6\" (1.676 m)   Wt 100 lb 1.4 oz (45.4 kg)   SpO2 90%   BMI 16.15 kg/m²   General appearance: alert, appears stated age and cooperative  Head: Normocephalic, without obvious abnormality, atraumatic  Lungs: rales bilat   Heart: regular rate and rhythm, S1, S2 normal, no murmur, click, rub or gallop  Abdomen: soft, non-tender; bowel sounds normal; no masses,  no organomegaly  Extremities: extremities normal, atraumatic, no cyanosis or edema    Labs:  Lab Results   Component Value Date    WBC 13.6 (H) 09/19/2021    HGB 9.0 (L) 09/19/2021    HCT 26.4 (L) 09/19/2021     (H) 09/19/2021    CHOL 152 02/03/2017    TRIG 95 02/03/2017    HDL 57 02/03/2017    ALT 6 (L) 09/15/2021    AST 15 09/15/2021     (L) 09/19/2021    K 3.5 09/19/2021    CL 97 (L) 09/19/2021    CREATININE 1.0 09/19/2021    BUN 17 09/19/2021    CO2 23 09/19/2021    TSH 4.62 (H) 10/02/2018    INR 1.04 09/14/2021    LABMICR YES 09/17/2021     Lab Results   Component Value Date    TROPONINI <0.01 09/14/2021 Recent Imaging Results are Reviewed:  XR CHEST PORTABLE    Result Date: 9/14/2021  EXAMINATION: ONE XRAY VIEW OF THE CHEST 9/14/2021 9:49 am COMPARISON: Chest x-ray dated 01/25/2020. HISTORY: ORDERING SYSTEM PROVIDED HISTORY: chest pain TECHNOLOGIST PROVIDED HISTORY: Reason for exam:->chest pain Reason for Exam: Chest Pain (Arrived by [facility] from [address] [place] that began last night. Denies other complaints. ASA given en route. Supplemental O2 provided in triage; Sat 99% on 2L. ) Acuity: Acute Type of Exam: Initial FINDINGS: HEART/MEDIASTINUM: The cardiomediastinal silhouette is stable. PLEURA/LUNGS: There are increased interstitial markings, which are chronic. There is however increased opacity in the left perihilar region which may reflect pulmonary vascular congestion versus an atypical pneumonia. Left basilar opacification reflecting atelectasis, effusion or airspace disease. There is no appreciable pneumothorax. BONES/SOFT TISSUE: No acute abnormality. Left basilar atelectasis, pleural effusion or airspace disease. Left perihilar airspace disease versus pulmonary vascular congestion. CT CHEST PULMONARY EMBOLISM W CONTRAST    Result Date: 9/14/2021  EXAMINATION: CTA OF THE CHEST 9/14/2021 11:12 am TECHNIQUE: CTA of the chest was performed after the administration of intravenous contrast.  Multiplanar reformatted images are provided for review. MIP images are provided for review. Dose modulation, iterative reconstruction, and/or weight based adjustment of the mA/kV was utilized to reduce the radiation dose to as low as reasonably achievable.  COMPARISON: 10/02/2018, chest radiograph done today HISTORY: ORDERING SYSTEM PROVIDED HISTORY: shortness of breath, chest pain, elevated dimer TECHNOLOGIST PROVIDED HISTORY: Reason for exam:->shortness of breath, chest pain, elevated dimer Decision Support Exception - unselect if not a suspected or confirmed emergency medical condition->Emergency Medical Condition (MA) Reason for Exam: SOB, CP, elevated D dimer Acuity: Unknown Type of Exam: Unknown FINDINGS: Pulmonary Arteries: Pulmonary arteries are adequately opacified for evaluation. No evidence of intraluminal filling defect to suggest pulmonary embolism. Main pulmonary artery is normal in caliber. Mediastinum: No evidence of mediastinal lymphadenopathy. The heart and pericardium demonstrate no acute abnormality. There is no acute abnormality of the thoracic aorta. Lungs/pleura: Left pleural effusion. Left lower lobe consolidation. Central airways are patent. No acute airspace disease in the right lung. Upper Abdomen: Limited images of the upper abdomen are unremarkable. Soft Tissues/Bones: Numerous compression deformities again seen throughout the thoracic spine similar to previous exam done 2018. Negative for acute pulmonary embolism Left pleural effusion with left lower lobe opacity that could represent pneumonia. Assessment and Plan:  Principal Problem:    COVID-19 virus infection - positive test 9/20  Plan: in droplet plus isolation    Pneumonia -Established problem. Stable. Pt with elevated wbc, elevated procal. covid ruled out. WBC still high but improving  Plan: escalated abx to cefepime on 9/16, appears to be improving. Change abx to po augmentin 9/19  Active Problems:    Hypothyroid -Established problem. Stable. Plan: cont on synthroid    Hyperlipidemia LDL goal <130 -Established problem. Stable. Plan: Continue present orders/plan. Hyponatremia -Established problem. Na 130  Plan: cont fluids. Essential hypertension -Established problem. Elevated 156/71  Plan: stay on same meds. Iv hydralazine prn ordered    YAYO (acute kidney injury) ([address] -Established problem. Stable. Creat 1.0  Plan: Continue present orders/plan. Acute respiratory failure with hypoxemia ([address] -Established problem.  Stable Now on 2L  Plan: cont to tx pna; escalated abx on 9/16 to cefepime, now to oral augmentin 9./19    Disp - hopefully be able to transfer to SNF  Once covid status is confirmed        Gavin Bahena MD  9/21/2021

## 2021-09-21 NOTE — PROGRESS NOTES
Speech Language Pathology  Dysphagia Treatment Note    Name:  Zahra Felix  :   1931  Medical Diagnosis:  YAYO (acute kidney injury) ([facility] [address] [N17.9]  Acute respiratory failure with hypoxemia ([facility] [address] [J96.01]  Pneumonia of left lower lobe due to infectious organism [J18.9]  Chest pain, unspecified type [R07.9]  Treatment Diagnosis: Oropharyngeal Dysphagia  Pain level:  Pt did not report pain    Diet level prior to treatment:   Regular diet with thin liquids/meds as tolerated    Tolerance of Current Diet Level:   Per chart review, no noted difficulty with current diet. Suspect ongoing reduced PO intake, minimal intake noted from breakfast meal present in Pt's room. Assessment of Texture Tolerance:  -Impressions:  Pt was positioned upright in bed, her nasal cannula was around her chin and O2 was at 89%. Nasal cannula (2L) was reapplied and O2 increased to 95-97%. Pt required encouragement to accept PO. Pt agreeable to thin liquids and chocolate pieces (from trail mix present in room). Thin liquids via straw revealed mild oral holding vs decreased A-P propulsion with mildly delayed swallow initiation noted. No overt coughing, throat clearing, or change in vocal quality noted post liquid trials. Mildly prolonged however effective mastication was noted with solid trials, adequate oral clearing was achieved. Pt declined further PO intake despite encouragement. Recommend to continue with current diet, no consistency restrictions. May benefit from dietary consult pending intake. Diet and Treatment Recommendations 2021:  Regular diet with thin liquids/meds as tolerated    Compensatory strategies: Alternate solids/liquids , Upright as possible with all PO intake , Small bites/sips     Dysphagia Goals:   1. Pt will functionally tolerate recommended diet with no overt clinical s/s of aspiration (ongoing 2021)   2.  Pt will demonstrate understanding of aspiration risk and precautions via education/demonstration with occasional prompting (ongoing 9/21/2021)   3. Pt will advance to least restrictive diet as indicated  (ongoing 9/21/2021)   4. If clinical s/s of aspiration/penetration continue to be noted, Pt will participate in Modified Barium Swallow Study (ongoing 9/21/2021)   5. Pt will demonstrate improved sensory motor function via targeted exercises (ongoing 9/21/2021)     Plan:   Continued daily Dysphagia treatment with goals per plan of care. 1-2 follow up sessions to ensure diet tolerance. Patient/Family Education:   Education given to the Pt and nurse, who verbalized understanding. Discharge Recommendations:    Pt not likely to require skilled SLP treatment upon d/c. Timed Code Treatment:   0 min    Total Treatment Time:   13 minutes     If patient discharges prior to next session this note will serve as a discharge summary.      Signature:   Tisha Stiles M.A., [address] [place] [address]  Speech-Language Pathologist

## 2021-09-21 NOTE — PLAN OF CARE
Pt disappointed that she did not d/c to [facility] Leslye Wolfe today. Pt understands that we are waiting on CoVid PCR test; pt stated \"I feel isolated & depressed\". This RN prayed with pt and offered emotional support. Plan is to increase frequency of rounds and spend time with pt. Bed alarm on; call light within reach.   Vitals:    09/20/21 2000   BP: 127/63   Pulse: 104   Resp: 18   Temp: 98.2 °F (36.8 °C)   SpO2: 97%       Problem: Pain:  Goal: Pain level will decrease  Description: Pain level will decrease  Outcome: Ongoing  Goal: Control of acute pain  Description: Control of acute pain  Outcome: Ongoing  Goal: Control of chronic pain  Description: Control of chronic pain  Outcome: Ongoing     Problem: Falls - Risk of:  Goal: Will remain free from falls  Description: Will remain free from falls  Outcome: Ongoing  Goal: Absence of physical injury  Description: Absence of physical injury  Outcome: Ongoing     Problem: Skin Integrity:  Goal: Will show no infection signs and symptoms  Description: Will show no infection signs and symptoms  Outcome: Ongoing  Goal: Absence of new skin breakdown  Description: Absence of new skin breakdown  Outcome: Ongoing

## 2021-09-21 NOTE — CARE COORDINATION
Pt's PCR came back negative. [facility] confirmed [facility] are able to accept. Transport is scheduled at 5pm today. Informed RN who will inform pt and family. HENS completed. Transport packet completed. Discharge Plan: [address] [place] [address] Oralee Call. Claudia, [facility]  Phone = [phone]  Fax = [phone]    [facility] at [address] [facility] today.     Electronically signed by JOSE CARLOS Duran, LSW on 9/21/2021 at 12:25 PM

## 2021-11-13 PROBLEM — E43 SEVERE MALNUTRITION (HCC): Chronic | Status: ACTIVE | Noted: 2021-01-01

## 2021-11-13 NOTE — PLAN OF CARE
Nutrition Problem #1: Severe malnutrition  Intervention: Food and/or Nutrient Delivery: Continue Current Diet, Start Oral Nutrition Supplement  Nutritional Goals: PO intake greater than 50%

## 2021-11-13 NOTE — PROGRESS NOTES
Pt admitted to room 4258 from ED. VSS, pt alert and oriented x 4, but forgetful. Fall risks screening completed. Orientation to room performed and instructions were provided for call light system. Call light and bedside table within pt reach. Will continue to monitor.

## 2021-11-13 NOTE — ACP (ADVANCE CARE PLANNING)
differences between Advance Directives and portable DNR orders.     Length of ACP Chart review in minutes: 3     Conversation Outcomes:  [x] ACP discussion completed  [] Existing advance directive reviewed with patient; no changes to patient's previously recorded wishes  [] New Advance Directive completed  [] Portable Do Not Rescitate prepared for Provider review and signature  [] POLST/POST/MOLST/MOST prepared for Provider review and signature      Follow-up plan:    [] Schedule follow-up conversation to continue planning  [] Referred individual to Provider for additional questions/concerns   [] Advised patient/agent/surrogate to review completed ACP document and update if needed with changes in condition, patient preferences or care setting    [x] This note routed to one or more involved healthcare providers               Jose Billingsley RN, BSN, Case Management  Phone: [phone]  Electronically signed by Jose Billingsley RN on 11/13/2021 at 1:50 PM

## 2021-11-13 NOTE — ED NOTES
Bed: B-07  Expected date: 11/12/21  Expected time: 6:53 PM  Means of arrival: [facility]  Comments:  Generalized Weakness     Elliott Michaud RN  11/12/21 1914

## 2021-11-13 NOTE — PROGRESS NOTES
Call made to [facility]. Pt home medications reconciled with nurse Angeles Sandoval. Will continue to monitor.

## 2021-11-13 NOTE — PROGRESS NOTES
4 Eyes Skin Assessment     NAME:  Contreras Felix  YOB: 1931  MEDICAL RECORD NUMBER:  [id number]    The patient is being assess for  Admission    I agree that 2 RN's have performed a thorough Head to Toe Skin Assessment on the patient. ALL assessment sites listed below have been assessed. Areas assessed by both nurses:    Head, Face, Ears, Shoulders, Back, Chest, Arms, Elbows, Hands, Sacrum. Buttock, Coccyx, Ischium and Legs. Feet and Heels        Does the Patient have a Wound?  No noted wound(s)       Mirza Prevention initiated:  Yes   Wound Care Orders initiated:  No    Pressure Injury (Stage 3,4, Unstageable, DTI, NWPT, and Complex wounds) if present place consult order under [de-identified] No    New and Established Ostomies if present place consult order under : No      Nurse 1 eSignature: Electronically signed by Donovan Marino RN on 11/13/21 at 6:50 AM EST    **SHARE this note so that the co-signing nurse is able to place an eSignature**    Nurse 2 eSignature: Electronically signed by Jabier Coronel RN on 11/13/21 at 6:51 AM EST

## 2021-11-13 NOTE — PROGRESS NOTES
No urine out put on this morning. Bladder scanned and reads 378 ml. Will pass for day shift to monitor her urine out put closely.

## 2021-11-13 NOTE — H&P
Hospital Medicine History & Physical      PCP: No primary care provider on file. Date of Admission: 11/12/2021    Date of Service: Pt seen/examined on 11/12/2021 and Admitted to Inpatient with expected LOS greater than two midnights due to medical therapy. Chief Complaint:  Sent from NH with diarrhea      History Of Present Illness:      80 y.o. female with PMHx of Depression, HLD, HTN and hypothyroidism presented to [facility] from [facility] after having episodes of diarrhea and generalized weakness. Nursing home staff patient has been experiencing diarrhea for the last 2 weeks. Reported weight loss and is feeling generally weak. Patient has no complaints at this time. She mentions urinary incontinence but denies having any nausea, vomiting or abdominal pain. No reported fevers or achy pains. Past Medical History:          Diagnosis Date    Carpal tunnel syndrome of left wrist     Chronic low back pain     Depression     [place] (hard of hearing)     Hyperlipidemia LDL goal <130     Hypothyroid     Insomnia     Osteoporosis     Urinary incontinence        Past Surgical History:          Procedure Laterality Date    BACK SURGERY      HIP PINNING Left 1/26/2020    LEFT HIP PINNING performed by Donnie Collado MD at [address] [place] [address]       Medications Prior to Admission:      Prior to Admission medications    Medication Sig Start Date End Date Taking?  Authorizing Provider   metoprolol tartrate (LOPRESSOR) 25 MG tablet Take 0.5 tablets by mouth 2 times daily 9/18/21   Fredrick Zhou MD   sertraline (ZOLOFT) 25 MG tablet Take 25 mg by mouth daily    Historical Provider, MD   furosemide (LASIX) 20 MG tablet Take 20 mg by mouth daily  4/20/20   Historical Provider, MD   donepezil (ARICEPT) 5 MG tablet Take 1 tablet by mouth nightly  Patient taking differently: Take 10 mg by mouth nightly  1/28/20   Fredrick Zhou MD   aspirin 325 MG EC tablet Take 1 tablet by mouth daily 1/28/20 2/27/20  Benjie Narvaez APRN - CNP   amitriptyline (ELAVIL) 10 MG tablet PRN 4/10/19   Lianna Turner MD   levothyroxine (SYNTHROID) 88 MCG tablet Take 88 mcg by mouth Daily    Historical Provider, MD   zoledronic acid (RECLAST) 5 MG/100ML SOLN Infuse 100 mLs intravenously once for 1 dose 11/30/18 11/30/18  Hellen Gonzalez MD   KRILL OIL PO Take 1 tablet by mouth daily    Historical Provider, MD   lisinopril (PRINIVIL;ZESTRIL) 5 MG tablet Take 0.5 tablets by mouth daily 11/20/18   DE Mcintyre - CNP   simvastatin (ZOCOR) 20 MG tablet TAKE ONE- HALF (1/2) TABLET BY MOUTH ONCE NIGHTLY  Patient taking differently: 10 mg  5/11/18   Abdoulaye Mahan MD   oxybutynin (DITROPAN) 5 MG tablet TAKE ONE TABLET BY MOUTH TWICE A DAY  Patient not taking: Reported on 1/15/2021 2/1/17   Humberto Beck DO   vitamin B-12 (CYANOCOBALAMIN) 100 MCG tablet Take 50 mcg by mouth daily    Historical Provider, MD   Calcium Carbonate-Vitamin D (CALTRATE 600+D PO) Take 1 tablet by mouth daily     Historical Provider, MD   multivitamin-iron-minerals-folic acid (CENTRUM) chewable tablet Take 1 tablet by mouth daily    Historical Provider, MD       Allergies:  Prozac [fluoxetine hcl]    Social History:      The patient currently lives at [address] [place] [address]:   reports that she has quit smoking. Her smoking use included cigarettes. She has a 2.50 pack-year smoking history. She has never used smokeless tobacco.  ETOH:   reports current alcohol use of about 1.0 standard drink of alcohol per week. Family History:      Reviewed in detail positive as follows:        Problem Relation Age of Onset    Heart Disease Father        REVIEW OF SYSTEMS:   Pertinent positives as noted in the HPI. All other systems reviewed and negative. PHYSICAL EXAM PERFORMED:    BP (!) 155/66   Pulse 66   Temp 98.4 °F (36.9 °C) (Axillary)   Resp 18   Wt 94 lb 12.8 oz (43 kg)   SpO2 100%   BMI 15.30 kg/m²     General appearance:  Thin elderly  female lying on ED cart  in no apparent distress, appears stated age and cooperative. HEENT:  Normal cephalic, atraumatic without obvious deformity. Pupils equal, round, and reactive to light. Conjunctivae/corneas clear. Neck: Supple, with full range of motion. No jugular venous distention. Trachea midline. Respiratory:  Normal respiratory effort. Clear to auscultation, bilaterally without accessory muscle use. Cardiovascular:  Regular rate and rhythm without murmurs, no lower extremity edema. Abdomen: Soft, thin abdomen, non-tender, non-distended, without rebound or guarding. Normal bowel sounds. Musculoskeletal:  Moves all extremities equally. Full range of motion without deformity. Skin: Skin warm, dry and intact. No rashes or lesions. Neurologic:  Neurovascularly intact without any focal sensory/motor deficits. Cranial nerves: II-XII intact, grossly non-focal.  Psychiatric:  Alert and oriented, thought content appropriate, normal insight  Capillary Refill: Brisk,< 3 seconds   Peripheral Pulses: +2 palpable, equal bilaterally       Labs:     Recent Labs     11/12/21 2126   WBC 9.0   HGB 9.6*   HCT 29.8*        Recent Labs     11/12/21 2046      K 3.4*   CL 96*   CO2 25   BUN 55*   CREATININE 1.8*   CALCIUM 10.0     Recent Labs     11/12/21 2046   AST 24   ALT 11   BILITOT 0.3   ALKPHOS 82     No results for input(s): INR in the last 72 hours. No results for input(s): Cecily Kras in the last 72 hours. Urinalysis:      Lab Results   Component Value Date    NITRU Negative 09/17/2021    WBCUA 3 09/17/2021    RBCUA 3 09/17/2021    BLOODU Negative 09/17/2021    SPECGRAV 1.029 09/17/2021    GLUCOSEU Negative 09/17/2021       Radiology:       XR CHEST PORTABLE   Final Result      1. Patient is rotated which limits the study. 2.  Left basilar opacity which is significantly improved from the prior study   and residual density could be chronic.   However small pleural effusion,   atelectasis or even pneumonitis cannot be excluded. ASSESSMENT:    Active Hospital Problems    Diagnosis Date Noted    YAYO (acute kidney injury) ([facility] [address] [N17.9] 09/14/2021         PLAN:    Acute kidney injury    - Likely related to dehydration 2/2 diarrhea  - crt baseline 1.0,  today 1.8  - IVF  - Avoid nephrotoxins  - check: UACS, Elias/UCrt, uric acid, TSH, U osmol  - follow renal function tests; if no improvement will get renal US     Hypokalemia   - K 3.4  - replaced and monitor    Diarrhea per NH reports  - monitor for diarrhea  - obtain specimen and send for culture    Atrial Fibrillation   - currently rate controlled  - continue metoprolol    Essential (primary) hypertension   - monitor blood pressure  - continue metoprolol, hold lisonpril     Hyperlipidemia   - continue statin    DVT Prophylaxis: lovenox  Diet: ADULT DIET; Regular  Code Status: DNR-CC    Dispo - Inpatient       [address], APRN - CNP    Thank you No primary care provider on file. for the opportunity to be involved in this patient's care. If you have any questions or concerns please feel free to contact me at [phone].

## 2021-11-13 NOTE — PROGRESS NOTES
Medication Reconciliation    List of medications patient is currently taking is complete. Source(s) of information:   1. [facility] Physician Orders  2. [facility] records     Dose changes: donepezil and levothyroxine  · [facility] for clarification of levothyroxine dose. Per RN, patient had been taking levothyroxine 88 mcg. She was admitted to the hospital and was receiving 88 mcg and 125 mcg at the assisted living facility. It was determined the patient could be on levothyroxine 200 mcg po daily.      Added medications: mirtazapine, acetaminophen and ibuprofen      Cristina Jaramillo, [address] [facility], PharmD, [facility]

## 2021-11-13 NOTE — PROGRESS NOTES
grossly non-focal.  Psychiatric: Alert and oriented, thought content appropriate, normal insight  Capillary Refill: Brisk,3 seconds, normal   Peripheral Pulses: +2 palpable, equal bilaterally       Labs:   Recent Labs     11/12/21 2126 11/13/21  0705   WBC 9.0 5.5   HGB 9.6* 9.4*   HCT 29.8* 29.0*    255     Recent Labs     11/12/21 2046 11/13/21  0705    141   K 3.4* 3.5   CL 96* 102   CO2 25 24   BUN 55* 52*   CREATININE 1.8* 1.8*   CALCIUM 10.0 9.0     Recent Labs     11/12/21 2046 11/13/21  0705   AST 24 18   ALT 11 9*   BILITOT 0.3 0.3   ALKPHOS 82 65     No results for input(s): INR in the last 72 hours. No results for input(s): April Horn in the last 72 hours. Urinalysis:      Lab Results   Component Value Date    NITRU Negative 11/13/2021    WBCUA 3 09/17/2021    RBCUA 3 09/17/2021    BLOODU MODERATE 11/13/2021    SPECGRAV 1.015 11/13/2021    GLUCOSEU Negative 11/13/2021       Radiology:  XR CHEST PORTABLE   Final Result      1. Patient is rotated which limits the study. 2.  Left basilar opacity which is significantly improved from the prior study   and residual density could be chronic. However small pleural effusion,   atelectasis or even pneumonitis cannot be excluded. Assessment/Plan:    -Acute kidney injury. .. Prerenal in the setting of chronic diarrhea with dehydration, diuretic and ACE----Lasix and lisinopril held on admission. . Continue IV normal saline, closely monitor renal function. . Creatinine peaked at 1.8, baseline is 1-1.2    -Chronic diarrhea--- family reports at least 2 months of diarrhea--C. difficile EIA negative, other stool studies pending--start Imodium as needed. . Will not pursue any further work-up as family is interested in hospice care    -Hypothyroidism--currently with iatrogenic hyperthyroidism. . TSH is 0.09. .. Currently on 200 mcg--- most recently ( 9/21) had been on 88mcg but dose increased to 200mcg at the [facility]. Karol Gibbs  Unclear if this may be contributing to diarrhea. .. Plan to decrease dose to 100mcg  and repeat TSH in 6 weeks    -Dementia-continue Aricept    -Hyperlipidemia-continue lovastatin    -Hypertension-stable-continue metoprolol    -Recent treatment for COVID-19 pneumonia/acute respiratory failure/YAYO 9/21    -Abnormal UA--as pyuria--no symptoms suggest UTI--follow-up urine culture--no antibiotics for now    DVT Prophylaxis: Subcu heparin  Diet: ADULT DIET;  Regular  Code Status: DNR-CC   Spoke with Jonathan/son--[phone]--family want her to be discharged back to SNF with hospice        Lora Kearns MD

## 2021-11-13 NOTE — PLAN OF CARE
Problem: Falls - Risk of:  Goal: Will remain free from falls  Description: Will remain free from falls  11/13/2021 1709 by Óscar Cherry RN  Outcome: Ongoing     Problem: Falls - Risk of:  Goal: Absence of physical injury  Description: Absence of physical injury  11/13/2021 1709 by Óscar Cherry RN  Outcome: Ongoing     Problem: Skin Integrity:  Goal: Will show no infection signs and symptoms  Description: Will show no infection signs and symptoms  11/13/2021 1709 by Óscar Cherry RN  Outcome: Ongoing     Problem: Skin Integrity:  Goal: Absence of new skin breakdown  Description: Absence of new skin breakdown  11/13/2021 1709 by Óscar Cherry RN  Outcome: Ongoing     Problem: Nutrition  Goal: Optimal nutrition therapy  11/13/2021 1709 by Óscar Cherry RN  Outcome: Ongoing

## 2021-11-13 NOTE — ED PROVIDER NOTES
[address]  [address] [address]  Dept: [address] [facility] [address]: [phone]  eMERGENCYdEPARTMENT eNCOUnter      Pt Name: Yanelis Felix  MRN: [id number]  Mariluzgfrenata 7/21/1931  Date of evaluation: 11/12/2021  Provider:Marisa Schneider, [place] - CNP     Evaluated independently per myself    [address] [place] [address]       Chief Complaint   Patient presents with    Diarrhea     patient here from [facility] for diarrhea, and generalized weakness. CRITICAL CARE TIME       HISTORY OF PRESENT ILLNESS  (Location/Symptom, Timing/Onset, Context/Setting, Quality, Duration,Modifying Factors, Severity.)   Yanelis Felix is a 80 y.o. female who presents to the emergency department PMHx: Hypothyroid, depression, chronic low back pain, hyperlipidemia, urinary incontinence  Patient sent in by [facility] from [facility] for reports of diarrhea for 2 weeks, weight loss and generalized weakness    CODE STATUS: [facility]: Per paperwork from [facility]  2045: Speaking with staff at [facility]: Person answering the phone is not aware of any work-up that has been done for this patient's weight loss or diarrhea. Unaware of any testing. The RN for the patient will try to be located and give me a call back so that I can obtain some additional information. nursing Notes were reviewedand agreed with or any disagreements were addressed in the HPI. REVIEW OF SYSTEMS    (2-9 systems for level 4, 10 or more for level 5)     Review of Systems   Constitutional: Negative. HENT: Negative. Respiratory: Negative. Cardiovascular: Negative. Gastrointestinal: Negative. Genitourinary: Negative. Musculoskeletal: Negative. Skin: Negative. Neurological: Negative. Except as noted above the remainder of the review of systems was reviewed and negative.        PAST MEDICAL HISTORY         Diagnosis Date    Carpal tunnel syndrome of left wrist     Chronic low back pain     Depression     [place] (hard of hearing)     Hyperlipidemia LDL goal <130     Hypothyroid     Insomnia     Osteoporosis     Urinary incontinence        SURGICAL HISTORY           Procedure Laterality Date    BACK SURGERY      HIP PINNING Left 2020    LEFT HIP PINNING performed by Gt Sanchez MD at [facility]     [unfilled]    ALLERGIES     Prozac [fluoxetine hcl]    FAMILY HISTORY           Problem Relation Age of Onset    Heart Disease Father      Family Status   Relation Name Status    Father   at age 64    Mother   at age 80        [facility]      reports that she has quit smoking. Her smoking use included cigarettes. She has a 2.50 pack-year smoking history. She has never used smokeless tobacco. She reports current alcohol use of about 1.0 standard drink of alcohol per week. She reports that she does not use drugs. PHYSICAL EXAM    (up to 7 for level 4, 8 or more for level 5)     ED Triage Vitals [21]   Enc Vitals Group      BP (!) 112/49      Pulse 84      Resp 16      Temp 97.8 °F (36.6 °C)      Temp Source Oral      SpO2 94 %      Weight 94 lb 12.8 oz (43 kg)      Height       Head Circumference       Peak Flow       Pain Score       Pain Loc       Pain Edu? Excl. in [address]? Physical Exam  Vitals and nursing note reviewed. Constitutional:       Appearance: She is not diaphoretic. HENT:      Head: Normocephalic. Mouth/Throat:      Mouth: Mucous membranes are moist.      Pharynx: Oropharynx is clear. Eyes:      Extraocular Movements: Extraocular movements intact. Pupils: Pupils are equal, round, and reactive to light. Cardiovascular:      Rate and Rhythm: Normal rate and regular rhythm. Pulses: Normal pulses. Pulmonary:      Effort: Pulmonary effort is normal.      Breath sounds: Normal breath sounds. Skin:     General: Skin is warm and dry.       Capillary Refill: Capillary refill takes less than 2 seconds. Neurological:      General: No focal deficit present. Mental Status: She is alert. Cranial Nerves: No cranial nerve deficit. Sensory: No sensory deficit. Motor: No weakness. Coordination: Coordination normal.      Comments: Oriented to person and place not time. Moves all extremities. No hemiparesis or paralysis. No facial drooping. DIAGNOSTIC RESULTS     EKG: All EKG's are interpreted by the Emergency Department Physician who either signs or Co-signs this chart in the absence of a cardiologist.    RADIOLOGY:   Non-plain film images such as CT, Ultrasound and MRI are read by the radiologist. Plain radiographic images are visualized and preliminarilyinterpreted by the emergency physician with the below findings:    Interpretation per the Radiologist below,if available at the time of this note:    XR CHEST PORTABLE   Final Result      1. Patient is rotated which limits the study. 2.  Left basilar opacity which is significantly improved from the prior study   and residual density could be chronic. However small pleural effusion,   atelectasis or even pneumonitis cannot be excluded.                LABS:  Labs Reviewed   CBC WITH AUTO DIFFERENTIAL - Abnormal; Notable for the following components:       Result Value    RBC 3.30 (*)     Hemoglobin 9.6 (*)     Hematocrit 29.8 (*)     RDW 16.6 (*)     All other components within normal limits    Narrative:     Performed at:  [address] [address]   Phone [phone]   COMPREHENSIVE METABOLIC PANEL - Abnormal; Notable for the following components:    Potassium 3.4 (*)     Chloride 96 (*)     Glucose 102 (*)     BUN 55 (*)     CREATININE 1.8 (*)     GFR Non- 26 (*)     GFR  32 (*)     All other components within normal limits    Narrative:     Performed at:  [facility]  [address] [facility] [place] [facility] [address]   Phone [phone]   GASTROINTESTINAL PANEL, MOLECULAR   C DIFF TOXIN/ANTIGEN   LACTIC ACID, PLASMA    Narrative:     Performed at:  [facility]  [address]  [place] [facility] [address]   Phone [phone]   URINE RT REFLEX TO CULTURE   TYPE AND SCREEN    Narrative:     Performed at:  [facility]  [address] [place] [facility] [address]   Phone [phone]       All other labs were within normal range or not returned as of this dictation. EMERGENCY DEPARTMENT COURSE and DIFFERENTIAL DIAGNOSIS/MDM:   Vitals:    Vitals:    11/12/21 1917   BP: (!) 112/49   Pulse: 84   Resp: 16   Temp: 97.8 °F (36.6 °C)   TempSrc: Oral   SpO2: 94%   Weight: 94 lb 12.8 oz (43 kg)     Medications   0.9% NaCl with KCl 20 mEq infusion ( IntraVENous New Bag 11/12/21 2202)       MDM   Patient was seen and evaluated per myself. Dr. Covarrubias Apt present available for consultation as needed. DDx: Ova and parasite, functional diarrhea, medication interaction, C. difficile, Shigella, etc.    I did review the patient's medication orders and it appears that they have started her on a probiotic. I do not see any diabetic medications on the patient's list    Clinical exam the patient appears frail, malnourished cachectic. Her weight is 94 pounds BMI is 15.3. Chest is clear and abdomen is soft and flat. She offers no complaints. She denies nausea vomiting or any type of chest pain or abdominal pain however I am not really confident in her reliability as this historian. Lab results show evidence of YAYO, mild hypokalemia. CBC is negative for left leukocytosis. There is some mild chronic anemia H&H is stable at 9.6/29.8. Chest x-ray is negative for acute changes. There is been no diarrhea in the ED. Lactic acid is 1.1. Urinalysis has not been obtained as of yet.     IVF: Normal saline with KCl repletion continuous rate  Strict I & O  UA and stool samples still to be collected. 2245: I have consulted Dr. Paolo Stanley, hospitalist attending for admission given the YAYO, mild hypokalemia, weight loss and generalized weakness. CONSULTS:  None    PROCEDURES:  Procedures    FINAL IMPRESSION      1. YAYO (acute kidney injury) ([facility] [address]    2. Hypokalemia    3. Diarrhea, unspecified type    4. Weight loss    5. Generalized weakness          DISPOSITION/PLAN   [facility]    PATIENT REFERRED TO:  No follow-up provider specified. DISCHARGE MEDICATIONS:  New Prescriptions    No medications on file       (Please note that portions of this note were completed with a voice recognition program.  Efforts were made to edit the dictations but occasionally words are mis-transcribed.)    DE Lemons - RINA     This dictation was performed with a verbal recognition program (DRAGON) and it was checked for errors. It is possible that there are still dictated errors within this office note. If so, please bring any errors to my attention for an addendum. All efforts were made to ensure that this office note is accurate.          DE Alas - CNP  11/12/21 4273

## 2021-11-14 NOTE — CARE COORDINATION
Spoke with Bibi Jacome from [facility]. Dtr Bee Lr is POA and unable to be reached today by [facility]. They spoke with son Karen Machuca who reports they do plan to have pt dc tomorrow and need a hospital bed.   [facility] working for Monday dc and son is trying to reach pts dtr, POA to get consents signed  Electronically signed by [id number] FARHAD Staley on 11/14/2021 at 4:45 PM

## 2021-11-14 NOTE — PROGRESS NOTES
Hospitalist Progress Note      PCP: No primary care provider on file. Date of Admission: 11/12/2021        Subjective:     Feels better. .. Diarrhea improved today per RN. Magalis Vargas No other acute events      Medications:  Reviewed    Infusion Medications    sodium chloride      sodium chloride 75 mL/hr at 11/13/21 2010     Scheduled Medications    atorvastatin  10 mg Oral Daily    vitamin B-12  50 mcg Oral Daily    donepezil  5 mg Oral Nightly    sodium chloride flush  5-40 mL IntraVENous 2 times per day    metoprolol tartrate  12.5 mg Oral BID    heparin (porcine)  5,000 Units SubCUTAneous 3 times per day    sertraline  100 mg Oral Daily    levothyroxine  100 mcg Oral Daily     PRN Meds: sodium chloride flush, sodium chloride, ondansetron **OR** ondansetron, acetaminophen **OR** acetaminophen, loperamide      Intake/Output Summary (Last 24 hours) at 11/14/2021 0955  Last data filed at 11/13/2021 2010  Gross per 24 hour   Intake 1438.31 ml   Output --   Net 1438.31 ml       Physical Exam Performed:    BP (!) 162/70   Pulse 68   Temp 98.2 °F (36.8 °C) (Oral)   Resp 16   Ht 5' 6\" (1.676 m)   Wt 83 lb 8.9 oz (37.9 kg)   SpO2 94%   BMI 13.49 kg/m²     General appearance: No apparent distress, appears stated age and cooperative. HEENT: Pupils equal, round, and reactive to light. Conjunctivae/corneas clear. Neck: Supple, with full range of motion. No jugular venous distention. Trachea midline. Respiratory:  Normal respiratory effort. Clear to auscultation, bilaterally without Rales/Wheezes/Rhonchi. Cardiovascular: Regular rate and rhythm with normal S1/S2 without murmurs, rubs or gallops. Abdomen: Soft, non-tender, non-distended with normal bowel sounds. Musculoskeletal: No clubbing, cyanosis or edema bilaterally. Full range of motion without deformity. Skin: Skin color, texture, turgor normal.  No rashes or lesions. Neurologic:  Neurovascularly intact without any focal sensory/motor deficits. Cranial nerves: II-XII intact, grossly non-focal.  Psychiatric: Alert and oriented, thought content appropriate, normal insight  Capillary Refill: Brisk,3 seconds, normal   Peripheral Pulses: +2 palpable, equal bilaterally       Labs:   Recent Labs     11/12/21 2126 11/13/21  0705 11/14/21  0735   WBC 9.0 5.5 4.7   HGB 9.6* 9.4* 8.8*   HCT 29.8* 29.0* 27.4*    255 250     Recent Labs     11/12/21 2046 11/13/21  0705 11/14/21  0735    141 139   K 3.4* 3.5 3.8   CL 96* 102 108   CO2 25 24 21   BUN 55* 52* 30*   CREATININE 1.8* 1.8* 1.1   CALCIUM 10.0 9.0 8.3     Recent Labs     11/12/21 2046 11/13/21  0705   AST 24 18   ALT 11 9*   BILITOT 0.3 0.3   ALKPHOS 82 65     No results for input(s): INR in the last 72 hours. No results for input(s): Stella Dross in the last 72 hours. Urinalysis:      Lab Results   Component Value Date    NITRU Negative 11/13/2021    WBCUA 32 11/13/2021    BACTERIA 2+ 11/13/2021    RBCUA 3 09/17/2021    BLOODU MODERATE 11/13/2021    SPECGRAV 1.015 11/13/2021    GLUCOSEU Negative 11/13/2021       Radiology:  XR CHEST PORTABLE   Final Result      1. Patient is rotated which limits the study. 2.  Left basilar opacity which is significantly improved from the prior study   and residual density could be chronic. However small pleural effusion,   atelectasis or even pneumonitis cannot be excluded. Assessment/Plan:    -Acute kidney injury. .. Prerenal in the setting of chronic diarrhea with dehydration, diuretic and ACE----Lasix and lisinopril held on admission. . Discontinue IV normal saline closely monitor renal function. . Creatinine peaked at 1.8-now back to baseline, baseline is 1-1.2    -Chronic diarrhea--- family reports at least 2 months of diarrhea--C. difficile EIA negative, GI pathogens by PCR is pending-diarrhea is better, continue Imodium as needed. . Will not pursue any further work-up as family is interested in hospice care    -Hypothyroidism--currently with iatrogenic hyperthyroidism. . TSH is 0.09. .. was on 200 mcg--- most recently ( 9/21) had been on 88mcg but dose increased to 200mcg at the SNF. [place] Unclear if this may be contributing to diarrhea. ..  decreased dose to 100mcg  and repeat TSH in 6 weeks    -Dementia-continue Aricept    -Hyperlipidemia-continue lovastatin    -Hypertension-stable-continue metoprolol    -Recent treatment for COVID-19 pneumonia/acute respiratory failure/YAYO 9/21    -Possible UTI-preliminary culture growing Citrobacter, Klebsiella and another organism. . I suspect she is just colonized-plan to help with treat empirically with Macrobid      DVT Prophylaxis: Subcu heparin  Diet: ADULT DIET; Regular  ADULT ORAL NUTRITION SUPPLEMENT; Breakfast, Lunch, Dinner; Standard High Calorie/High Protein Oral Supplement  Code Status: DNR-CC   Spoke with Jonathan/son--[phone]--family want her to be discharged back to SNF with hospice    Disposition--- to SNF with hospice in 24 hrs. --[address] is yet to meet with family-likely tomorrow--hospice qualifying diagnosis will be advanced dementia    Sunni Aldana MD

## 2021-11-14 NOTE — PLAN OF CARE
Problem: Falls - Risk of:  Goal: Will remain free from falls  Description: Will remain free from falls  11/14/2021 1052 by Ugo Coleman RN  Outcome: Ongoing     Problem: Falls - Risk of:  Goal: Absence of physical injury  Description: Absence of physical injury  11/14/2021 1052 by Ugo Coleman RN  Outcome: Ongoing     Problem: Skin Integrity:  Goal: Will show no infection signs and symptoms  Description: Will show no infection signs and symptoms  11/14/2021 1052 by Ugo Coleman RN  Outcome: Ongoing     Problem: Skin Integrity:  Goal: Absence of new skin breakdown  Description: Absence of new skin breakdown  11/14/2021 1052 by Ugo Coleman RN  Outcome: Ongoing     Problem: Nutrition  Goal: Optimal nutrition therapy  11/14/2021 1052 by Ugo Coleman RN  Outcome: Ongoing

## 2021-11-14 NOTE — PLAN OF CARE
Problem: Falls - Risk of:  Goal: Will remain free from falls  Description: Will remain free from falls  11/14/2021 0104 by Caryn Inman RN  Outcome: Ongoing  11/13/2021 1709 by Kev Olmstead RN  Outcome: Ongoing  Goal: Absence of physical injury  Description: Absence of physical injury  11/14/2021 0104 by Caryn Inman RN  Outcome: Ongoing  11/13/2021 1709 by Kev Olmstead RN  Outcome: Ongoing     Problem: Skin Integrity:  Goal: Will show no infection signs and symptoms  Description: Will show no infection signs and symptoms  11/14/2021 0104 by Caryn Inman RN  Outcome: Ongoing  11/13/2021 1709 by Kev Olmstead RN  Outcome: Ongoing  Goal: Absence of new skin breakdown  Description: Absence of new skin breakdown  11/14/2021 0104 by Caryn Inman RN  Outcome: Ongoing  11/13/2021 1709 by Kev Olmstead RN  Outcome: Ongoing     Problem: Nutrition  Goal: Optimal nutrition therapy  11/14/2021 0104 by Caryn Inman RN  Outcome: Ongoing  11/13/2021 1709 by Kev Olmstead RN  Outcome: Ongoing  11/13/2021 1154 by Soraya Philip RD, LD  Outcome: Ongoing

## 2021-11-15 NOTE — CARE COORDINATION
CASE MANAGEMENT DISCHARGE SUMMARY:    DISCHARGE DATE: 11/15/21    DISCHARGED TO: Phillip Both with [address] following               REPORT NUMBER: [phone]               FAX NUMBER: [phone]    TRANSPORTATION: [facility]             TIME: 10 Sri Bella RN, BSN, Case Management  [phone]    Electronically signed by Jana Johnson RN on 11/15/2021 at 2:53 PM

## 2021-11-15 NOTE — PROGRESS NOTES
1044:  Patient complaining of nausea and expectorating thick saliva - colorless. Zofran injection given IV via right Antecubital peripheral IV access.     Patient seems lethargic and drowsy and unable to articulate her words but vitals are as follows:    Vitals:    11/15/21 0902   BP: 137/79   Pulse: 72   Resp: 16   Temp: 97.9 °F (36.6 °C)   SpO2: 95%     No complaints of pain at this time

## 2021-11-15 NOTE — PROGRESS NOTES
Orders to discharge patient Back to [facility]. Removed heplock. Patient tolerated well. Gave prescriptions for  to patient and provided written lexicomp handouts on each prescription. Reviewed AVS summary with patient including home medications, new prescriptions, diet, activity, follow up appointments and when to call the doctor. Patient verbalized understanding of all material. Awaiting transportation home.

## 2021-11-15 NOTE — PROGRESS NOTES
Hospitalist Progress Note      PCP: No primary care provider on file. Date of Admission: 11/12/2021      Subjective: Denies chest pain at this time, no family member at bedside. Patient denies shortness of breath nausea or vomiting. She is feeling tired though. Medications:  Reviewed    Infusion Medications    sodium chloride       Scheduled Medications    nitrofurantoin (macrocrystal-monohydrate)  100 mg Oral 2 times per day    atorvastatin  10 mg Oral Daily    vitamin B-12  50 mcg Oral Daily    donepezil  5 mg Oral Nightly    sodium chloride flush  5-40 mL IntraVENous 2 times per day    metoprolol tartrate  12.5 mg Oral BID    heparin (porcine)  5,000 Units SubCUTAneous 3 times per day    sertraline  100 mg Oral Daily    levothyroxine  100 mcg Oral Daily     PRN Meds: sodium chloride flush, sodium chloride, ondansetron **OR** ondansetron, acetaminophen **OR** acetaminophen, loperamide    No intake or output data in the 24 hours ending 11/15/21 0933    Physical Exam Performed:    /79   Pulse 72   Temp 97.9 °F (36.6 °C) (Oral)   Resp 16   Ht 5' 6\" (1.676 m)   Wt 89 lb 11.6 oz (40.7 kg)   SpO2 95%   BMI 14.48 kg/m²     General appearance: No apparent distress  Respiratory:  Normal respiratory effort. Clear to auscultation, bilaterally without Rales/Wheezes/Rhonchi. Cardiovascular: Regular rate and rhythm with normal S1/S2 without murmurs, rubs or gallops. Abdomen: Soft, non-tender, non-distended with normal bowel sounds. Musculoskeletal: No clubbing, cyanosis   Skin: Skin color, texture, turgor normal.  No rashes or lesions.   Neurologic: Extremities  Psychiatric: Alert and oriented to place and person  Capillary Refill: Brisk,3 seconds, normal   Peripheral Pulses: +2 palpable, equal bilaterally       Labs:   Recent Labs     11/12/21  2126 11/13/21  0705 11/14/21  0735   WBC 9.0 5.5 4.7   HGB 9.6* 9.4* 8.8*   HCT 29.8* 29.0* 27.4*    255 250     Recent Labs 11/12/21 2046 11/13/21  0705 11/14/21  0735    141 139   K 3.4* 3.5 3.8   CL 96* 102 108   CO2 25 24 21   BUN 55* 52* 30*   CREATININE 1.8* 1.8* 1.1   CALCIUM 10.0 9.0 8.3     Recent Labs     11/12/21 2046 11/13/21  0705   AST 24 18   ALT 11 9*   BILITOT 0.3 0.3   ALKPHOS 82 65     No results for input(s): INR in the last 72 hours. Recent Labs     11/15/21  0710   TROPONINI 0.11*       Urinalysis:      Lab Results   Component Value Date    NITRU Negative 11/13/2021    WBCUA 32 11/13/2021    BACTERIA 2+ 11/13/2021    RBCUA 3 09/17/2021    BLOODU MODERATE 11/13/2021    SPECGRAV 1.015 11/13/2021    GLUCOSEU Negative 11/13/2021       Radiology:  XR CHEST PORTABLE   Final Result      1. Patient is rotated which limits the study. 2.  Left basilar opacity which is significantly improved from the prior study   and residual density could be chronic. However small pleural effusion,   atelectasis or even pneumonitis cannot be excluded. Assessment/Plan:    Active Hospital Problems    Diagnosis     YAYO (acute kidney injury) ([facility] [address] [N17.9]      1. Acute kidney injury, prerenal, received IV fluid, improving  2. Chronic diarrhea, C. difficile negative, Imodium as needed plan to proceed with hospice care. 3.  Chest pain last night, EKG nonspecific, troponin mildly elevated initially and increased. I will start aspirin, I will consult cardiology for recommendations apparently will need medical therapy. 4.  Hypothyroidism, levothyroxine adjusted  5. Dementia stable  6. Essential hypertension, stable on metoprolol  7. Possible UTI, antibiotics based on sensitivity I will change Macrobid to low-dose Levaquin for 3 days          Diet: ADULT DIET;  Regular  ADULT ORAL NUTRITION SUPPLEMENT; Breakfast, Lunch, Dinner; Standard High Calorie/High Protein Oral Supplement  Code Status: DNR-CC        Michael Welsh MD

## 2021-11-15 NOTE — PLAN OF CARE
Problem: Falls - Risk of:  Goal: Will remain free from falls  Description: Will remain free from falls  11/15/2021 1523 by Madison Garza RN  Outcome: Completed  11/15/2021 0454 by Srini Deal RN  Outcome: Ongoing  Goal: Absence of physical injury  Description: Absence of physical injury  11/15/2021 1523 by Madison Garza RN  Outcome: Completed  11/15/2021 0454 by Srini Deal RN  Outcome: Ongoing     Problem: Skin Integrity:  Goal: Will show no infection signs and symptoms  Description: Will show no infection signs and symptoms  11/15/2021 1523 by Madison Garza RN  Outcome: Completed  11/15/2021 0454 by Srini Deal RN  Outcome: Ongoing  Goal: Absence of new skin breakdown  Description: Absence of new skin breakdown  11/15/2021 1523 by Madison Garza RN  Outcome: Completed  11/15/2021 0454 by Srini Deal RN  Outcome: Ongoing     Problem: Nutrition  Goal: Optimal nutrition therapy  11/15/2021 1523 by Madison Garza RN  Outcome: Completed  11/15/2021 0454 by Srini Deal RN  Outcome: Ongoing

## 2021-11-15 NOTE — PROGRESS NOTES
Clinical Pharmacy Note  Renal Dose Adjustment    [facility] KIMBERLEY Felix is receiving Lovenox. This medication is renally eliminated. Based on the patient's est. CrCl ~22 mL/min and urine output, the dose has been adjusted to 40 mg daily per protocol. Pharmacy will continue to monitor and adjust dose as needed for changes in renal function.

## 2021-11-15 NOTE — DISCHARGE SUMMARY
Hospital Medicine Discharge Summary    Patient ID: Vignesh Felix      Patient's PCP: No primary care provider on file. Admit Date: 11/12/2021     Discharge Date:   11/15/2021    Admitting Physician: Aditya Zapata MD     Discharge Physician: Michael Welsh MD     Discharge Diagnoses: Active Hospital Problems    Diagnosis     NSTEMI (non-ST elevated myocardial infarction) ([facility] [I21.4]     Dementia without behavioral disturbance ([facility] [F03.90]     Cachexia ([facility] [R64]     YAYO (acute kidney injury) ([facility] [N17.9]        The patient was seen and examined on day of discharge and this discharge summary is in conjunction with any daily progress note from day of discharge. Hospital Course:     From HPI:\"89 y.o. female with PMHx of Depression, HLD, HTN and hypothyroidism presented to [facility] from [address] [place] [address] after having episodes of diarrhea and generalized weakness. Nursing home staff patient has been experiencing diarrhea for the last 2 weeks. Reported weight loss and is feeling generally weak. Patient has no complaints at this time. She mentions urinary incontinence but denies having any nausea, vomiting or abdominal pain. No reported fevers or achy pains. \"      1. Acute kidney injury, prerenal, received IV fluid, improved. 2.  Chronic diarrhea, C. difficile negative, Imodium as needed plan to proceed with hospice care. Called and discussed with her daughter Felice, discussed in details finding of non-STEMI her diarrhea her possible UTI and at this time with the worries of diarrhea and complicated C. difficile risk, patient will be discharged off antibiotics but will add aspirin for pain control as patient will be discharged to hospice per family request.  All questions answered. 3.  Chest pain last night, EKG nonspecific, troponin mildly elevated initially and increased.   Decision was made per family to proceed with hospice care, patient discharged on aspirin for pain control. Hospice team can add pain management. 4.  Hypothyroidism, levothyroxine adjusted  5. Dementia stable  6. Essential hypertension, stable on metoprolol  7. Possible UTI, antibiotics as inpatient        Physical Exam Performed:     /72   Pulse 72   Temp 97.6 °F (36.4 °C) (Oral)   Resp 16   Ht 5' 6\" (1.676 m)   Wt 89 lb 11.6 oz (40.7 kg)   SpO2 95%   BMI 14.48 kg/m²       Per progress note      Labs: For convenience and continuity at follow-up the following most recent labs are provided:      CBC:    Lab Results   Component Value Date    WBC 4.7 11/14/2021    HGB 8.8 11/14/2021    HCT 27.4 11/14/2021     11/14/2021       Renal:    Lab Results   Component Value Date     11/14/2021    K 3.8 11/14/2021    K 3.5 11/13/2021     11/14/2021    CO2 21 11/14/2021    BUN 30 11/14/2021    CREATININE 1.1 11/14/2021    CALCIUM 8.3 11/14/2021    PHOS 3.8 07/28/2015         Significant Diagnostic Studies    Radiology:   XR CHEST PORTABLE   Final Result      1. Patient is rotated which limits the study. 2.  Left basilar opacity which is significantly improved from the prior study   and residual density could be chronic. However small pleural effusion,   atelectasis or even pneumonitis cannot be excluded.                 Consults:     IP CONSULT TO HOSPITALIST  IP CONSULT TO PALLIATIVE CARE  IP CONSULT TO SOCIAL WORK  IP CONSULT TO HOSPICE  IP CONSULT TO SPIRITUAL SERVICES  IP CONSULT TO CARDIOLOGY    Disposition:  hospice     Condition at Discharge: Stable    Discharge Instructions/Follow-up:  Hospice care     Code Status:  DNR-CC     Activity: activity as tolerated    Diet: regular diet      Discharge Medications:     Current Discharge Medication List           Details   aspirin 81 MG chewable tablet Take 1 tablet by mouth daily  Qty: 30 tablet, Refills: 0              Details   levothyroxine (SYNTHROID) 88 MCG tablet Take 1 tablet by mouth Daily  Qty: 30 tablet, Refills: 0 Details   mirtazapine (REMERON) 15 MG tablet Take 7.5 mg by mouth nightly      acetaminophen (TYLENOL) 500 MG tablet Take 500 mg by mouth every 4 hours as needed for Pain      metoprolol tartrate (LOPRESSOR) 25 MG tablet Take 0.5 tablets by mouth 2 times daily  Qty: 60 tablet, Refills: 3      sertraline (ZOLOFT) 100 MG tablet Take 100 mg by mouth daily       donepezil (ARICEPT) 5 MG tablet Take 1 tablet by mouth nightly  Qty: 30 tablet, Refills: 3      simvastatin (ZOCOR) 20 MG tablet TAKE ONE- HALF (1/2) TABLET BY MOUTH ONCE NIGHTLY  Qty: 45 tablet, Refills: 2    Associated Diagnoses: Hyperlipidemia LDL goal <130      oxybutynin (DITROPAN) 5 MG tablet TAKE ONE TABLET BY MOUTH TWICE A DAY  Qty: 180 tablet, Refills: 1    Associated Diagnoses: Continuous leakage of urine      amitriptyline (ELAVIL) 10 MG tablet PRN  Qty: 90 tablet, Refills: 2    Associated Diagnoses: Dysthymia      Calcium Carbonate-Vitamin D (CALTRATE 600+D PO) Take 1 tablet by mouth daily              Time Spent on discharge is more than 45 minutes in the examination, evaluation, counseling and review of medications and discharge plan. Signed:    Phani Dee MD   11/15/2021      Thank you No primary care provider on file. for the opportunity to be involved in this patient's care. If you have any questions or concerns please feel free to contact me at [phone].

## 2021-11-15 NOTE — DISCHARGE INSTR - COC
Continuity of Care Form    Patient Name: Anali Ellis   :  1931  MRN:  [id number]    Admit date:  2021  Discharge date:  11/15/2021      Code Status Order: DNR-CC   Advance Directives:      Admitting Physician:  Milka Garcia MD  PCP: No primary care provider on file.     Discharging Nurse: [facility] Unit/Room#: M4A-5106/5423-78  Discharging Unit Phone Number: [phone]    Emergency Contact:   Extended Emergency Contact Information  Primary Emergency Contact: [address] Phone: [phone]  Mobile Phone: [phone]  Relation: Child  Secondary Emergency Contact: [address]  Mobile Phone: [phone]  Relation: Child    Past Surgical History:  Past Surgical History:   Procedure Laterality Date    BACK SURGERY      HIP PINNING Left 2020    LEFT HIP PINNING performed by Chelo Miller MD at [facility] OR       Immunization History:   Immunization History   Administered Date(s) Administered    COVID-19, Pfizer, PF, 30mcg/0.3mL 2021, 2021, 2021    Influenza Vaccine, unspecified formulation 10/12/2015, 2016    Influenza, High Dose (Fluzone 65 yrs and older) 2016    Pneumococcal Conjugate 13-valent (Vzphdls85) 2016    Pneumococcal Polysaccharide (Yjddddooe53) 2017    Tdap (Boostrix, Adacel) 2015       Active Problems:  Patient Active Problem List   Diagnosis Code    Hypothyroid E03.9    Risk for falls Z91.81    Hyperlipidemia LDL goal <130 E78.5    Hyponatremia E87.1    Anemia D64.9    Pneumonia J18.9    Bronchitis J40    Senile osteoporosis M81.0    PAF (paroxysmal atrial fibrillation) (HCC) I48.0    Essential hypertension I10    Encounter for electronic analysis of reveal event recorder Z45.09    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  She went to pharmacy and none is available.      Pharmacy contacted, they do have Rx and will get ready now.  They provided instructions for expedited partner therapy- must pay cash.    Pt updated, she will  Rx.   anemia D62    Suspected COVID-19 virus infection Z20.822    YAYO (acute kidney injury) (HCC) N17.9    Acute respiratory failure with hypoxemia ([facility]) J96.01    COVID-19 U07.1    Severe malnutrition (HCC) E43    NSTEMI (non-ST elevated myocardial infarction) ([facility] I21.4    Dementia without behavioral disturbance ([facility] F03.90    Cachexia ([facility] R64       Isolation/Infection:   Isolation            No Isolation          Patient Infection Status       Infection Onset Added Last Indicated Last Indicated By Review Planned Expiration Resolved Resolved By    None active    Resolved    C-diff Rule Out 21 Clostridium Difficile Toxin/Antigen (Ordered)   21 Rule-Out Test Resulted    COVID-19 21 COVID-19, Rapid   10/04/21     COVID-19 Rule Out 21 COVID-19 (Ordered)   09/15/21 Rule-Out Test Resulted            Nurse Assessment:  Last Vital Signs: /72   Pulse 72   Temp 97.6 °F (36.4 °C) (Oral)   Resp 16   Ht 5' 6\" (1.676 m)   Wt 89 lb 11.6 oz (40.7 kg)   SpO2 95%   BMI 14.48 kg/m²     Last documented pain score (0-10 scale): Pain Level: 7  Last Weight:   Wt Readings from Last 1 Encounters:   11/15/21 89 lb 11.6 oz (40.7 kg)     Mental Status:  disoriented, alert, logical, thought processes intact, and able to concentrate and follow conversation    IV Access:  - None    Nursing Mobility/ADLs:  Walking   Dependent  Transfer  Assisted  Bathing  Assisted  Dressing  Assisted  Toileting  Assisted  Feeding  Assisted  Med Admin  Assisted  Med Delivery   whole    Wound Care Documentation and Therapy:        Elimination:  Continence: Bowel: No  Bladder: No  Urinary Catheter: None   Colostomy/Ileostomy/Ileal Conduit: No       Date of Last BM: 11/15/2021    Intake/Output Summary (Last 24 hours) at 11/15/2021 1457  Last data filed at 11/15/2021 1327  Gross per 24 hour   Intake 0 ml   Output --   Net 0 ml     I/O last 3 completed shifts:   In: 300 [P.O.:300]  Out: -     Safety Concerns:     None    Impairments/Disabilities:      None    Nutrition Therapy:  Current Nutrition Therapy:   - Oral Diet:  General  - Oral Nutrition Supplement:  Standard  daily and High Protein Pudding  daily    Routes of Feeding: Oral  Liquids: Thin Liquids  Daily Fluid Restriction: no  Last Modified Barium Swallow with Video (Video Swallowing Test): not done    Treatments at the Time of Hospital Discharge:   Respiratory Treatments: N/a  Oxygen Therapy:  2Lpm  Ventilator:    - No ventilator support    Rehab Therapies: Comfort care  Weight Bearing Status/Restrictions: No weight bearing restirctions  Other Medical Equipment (for information only, NOT a DME order):  wheelchair and walker  Other Treatments: N/a    Patient's personal belongings (please select all that are sent with patient):  Glasses    RN SIGNATURE:  Electronically signed by Arthur Mg RN on 11/15/21 at 3:45 PM EST    CASE MANAGEMENT/SOCIAL WORK SECTION    Inpatient Status Date: ***    Readmission Risk Assessment Score:  Readmission Risk              Risk of Unplanned Readmission:  19         DISCHARGED TO: [address] [place] [address] 550 with [facility] following             REPORT NUMBER: [id number] NUMBER: [phone]    / signature: Electronically signed by Yomaira Scott RN on 11/15/21 at 2:57 PM EST    PHYSICIAN SECTION    Prognosis: {Prognosis:7440939136}    Condition at Discharge: [facility] Patient Condition:680242747}    Rehab Potential (if transferring to Rehab): {Prognosis:0319406367}    Recommended Labs or Other Treatments After Discharge: ***    Physician Certification: I certify the above information and transfer of Anali Ellis  is necessary for the continuing treatment of the diagnosis listed and that she requires {Admit to Appropriate Level of Care:50803} for {GREATER/LESS:130248007} 30 days.      Update Admission H&P: {CHP DME Changes in AVBCY:010326643}    PHYSICIAN SIGNATURE:  {Esignature:224201161}

## 2021-11-15 NOTE — CONSULTS
PALLIATIVE MEDICINE CONSULTATION     Patient name:Chyna Felix   YAP:[id number]    [facility]:2/46/4169  Room/Bed:A9H-6601/4258-01   LOS: 3 days         Date of consult:11/15/2021    Consult Information    Inpatient consult to Palliative Care  Consult performed by: DE Parker CNP  Consult ordered by: DE Rahman CNP         Reason for consult: Goals of Care    Number of admissions past 12 months: 2    ASSESSMENT/RECOMMENDATIONS     80 y.o. female with debility, malnutrition and AMS. Symptom Management:  1. Debility: Patient very weak (head to toe) and lethargic. Patient requiring assistance with all ADL's at this time. 2. Malnutrition: Patient weighs 89 lbs with 14.48 BMI. 3. AMS: Patient only oriented x1 to self during my visit. Patient very lethargic and not decisional during my visit. Not oriented to her situation during my visit. 4. Goals of Care:  Met patient at bedside. Patient oriented x1 to self. She was lethargic and not oriented to her situation. Patient not decisional today. Attempted to call patient's daughter and primary decision maker Martha but was only able to leave a voicemail. Per chart review, it appears the family would like to go with hospice care. Hospice referral already placed by Dr. Renetta Bowen. Patient's code status remains DNR-CC at this time. Patient/Family Goals of Care :    Met patient at bedside. Patient oriented x1 to self. She was lethargic and not oriented to her situation. Patient not decisional today. Attempted to call patient's daughter and primary decision maker Martha but was only able to leave a voicemail. Per chart review, it appears the family would like to go with hospice care. Hospice referral already placed by Dr. Renetta Bowen. Patient's code status remains DNR-CC at this time.      Disposition/Discharge Plan:   Pending, possibly with hospice    Advance Directives:  · Surrogate Decision Maker: Martha today.   Psychiatric:      Comments: lethargic              Current labs in the epic chart reviewed as of 11/15/2021   Review of previous notes, admits, labs, radiology and testing relevant to this consult done in this chart today 11/15/2021      Total time: 77 minutes  >50% of time spent counseling patient at bedside or POA/family member if applicable , reviewing information and discussing care, coordinating with care team  Signed By: Electronically signed by DE Stein CNP on 11/15/2021 at 11:27 AM  Palliative Medicine   [phone]    November 15, 2021

## 2021-11-15 NOTE — CONSULTS
Referring Physician: Dr. Vivek Marc  Reason for Consultation: Chest pain  Chief Complaint: I do not feel good    Subjective:   History of Present Illness:  Adrien Pantoja is a 80 y.o. patient who presented to the hospital from a nursing home with complaints of diarrhea. Reportedly the patient has dementia. She received IV morphine this morning and is drowsy. She is oriented to person and place but not time or situation. She says that she is in the hospital for nausea. She does not recall having diarrhea although it appears to been a chronic issue. Reportedly she was complaining of chest pain this morning. She does not recall having chest pain and currently denies. Regardless, a troponin was ordered which was abnormal and the repeat is up trending. Patient is not able to provide much relevant history. No family members are present bedside. The patient has documentation of being comfort care measures only. There is also an order placed for hospice consult but this is not addressed in the hospital's most recent note. Past Medical History:   has a past medical history of Carpal tunnel syndrome of left wrist, Chronic low back pain, Depression, [place] (hard of hearing), Hyperlipidemia LDL goal <130, Hypothyroid, Insomnia, Osteoporosis, and Urinary incontinence. Surgical History:   has a past surgical history that includes back surgery and hip pinning (Left, 1/26/2020). Social History:   reports that she has quit smoking. Her smoking use included cigarettes. She has a 2.50 pack-year smoking history. She has never used smokeless tobacco. She reports current alcohol use of about 1.0 standard drink of alcohol per week. She reports that she does not use drugs. Family History:  family history includes Heart Disease in her father. Home Medications:  Were reviewed and are listed in nursing record and/or below  Prior to Admission medications    Medication Sig Start Date End Date Taking?  Authorizing Provider   mirtazapine (REMERON) 15 MG tablet Take 7.5 mg by mouth nightly   Yes Historical Provider, MD   Ibuprofen-diphenhydrAMINE HCl (IBUPROFEN PM) 200-25 MG CAPS Take 1 tablet by mouth nightly as needed (sleep)   Yes Historical Provider, MD   acetaminophen (TYLENOL) 500 MG tablet Take 500 mg by mouth every 4 hours as needed for Pain   Yes Historical Provider, MD   metoprolol tartrate (LOPRESSOR) 25 MG tablet Take 0.5 tablets by mouth 2 times daily 9/18/21  Yes Tarun Salcedo MD   sertraline (ZOLOFT) 100 MG tablet Take 100 mg by mouth daily    Yes Historical Provider, MD   furosemide (LASIX) 20 MG tablet Take 20 mg by mouth daily  4/20/20  Yes Historical Provider, MD   donepezil (ARICEPT) 5 MG tablet Take 1 tablet by mouth nightly 1/28/20  Yes Tarun Salcedo MD   levothyroxine (SYNTHROID) 200 MCG tablet Take 200 mcg by mouth Daily    Yes Historical Provider, MD   KRILL OIL PO Take 1 tablet by mouth daily   Yes Historical Provider, MD   lisinopril (PRINIVIL;ZESTRIL) 5 MG tablet Take 0.5 tablets by mouth daily 11/20/18  Yes DE Khan CNP   simvastatin (ZOCOR) 20 MG tablet TAKE ONE- HALF (1/2) TABLET BY MOUTH ONCE NIGHTLY  Patient taking differently: Take 10 mg by mouth nightly  5/11/18  Yes Miguelito Burdick MD   oxybutynin (DITROPAN) 5 MG tablet TAKE ONE TABLET BY MOUTH TWICE A DAY 2/1/17  Yes Marija Deluna DO   vitamin B-12 (CYANOCOBALAMIN) 100 MCG tablet Take 50 mcg by mouth daily   Yes Historical Provider, MD   multivitamin-iron-minerals-folic acid (CENTRUM) chewable tablet Take 1 tablet by mouth daily   Yes Historical Provider, MD   aspirin 325 MG EC tablet Take 1 tablet by mouth daily 1/28/20 2/27/20  AkiakDE Schultz - CNP   amitriptyline (ELAVIL) 10 MG tablet PRN  Patient taking differently: Take 10 mg by mouth daily as needed for Pain  4/10/19   Dennise Councilman, MD   Calcium Carbonate-Vitamin D (CALTRATE 600+D PO) Take 1 tablet by mouth daily     Historical Provider, MD CURRENT Medications:  aspirin chewable tablet 162 mg, Once  levoFLOXacin (LEVAQUIN) tablet 250 mg, Daily  enoxaparin (LOVENOX) injection 40 mg, BID  [START ON 11/16/2021] atorvastatin (LIPITOR) tablet 40 mg, Daily  [START ON 11/16/2021] aspirin chewable tablet 81 mg, Daily  vitamin B-12 (CYANOCOBALAMIN) tablet 50 mcg, Daily  donepezil (ARICEPT) tablet 5 mg, Nightly  sodium chloride flush 0.9 % injection 5-40 mL, 2 times per day  sodium chloride flush 0.9 % injection 5-40 mL, PRN  0.9 % sodium chloride infusion, PRN  ondansetron (ZOFRAN-ODT) disintegrating tablet 4 mg, Q8H PRN   Or  ondansetron (ZOFRAN) injection 4 mg, Q6H PRN  acetaminophen (TYLENOL) tablet 650 mg, Q6H PRN   Or  acetaminophen (TYLENOL) suppository 650 mg, Q6H PRN  metoprolol tartrate (LOPRESSOR) tablet 12.5 mg, BID  sertraline (ZOLOFT) tablet 100 mg, Daily  loperamide (IMODIUM) capsule 2 mg, 4x Daily PRN  levothyroxine (SYNTHROID) tablet 100 mcg, Daily    Allergies:  Prozac [fluoxetine hcl]     Review of Systems:   · Constitutional: no unanticipated weight loss. There's been no change in energy level, sleep pattern, or activity level. No fevers, chills. · Eyes: No visual changes or diplopia. No scleral icterus. · ENT: No Headaches, hearing loss or vertigo. No mouth sores or sore throat. · Cardiovascular: as reviewed in HPI  · Respiratory: No cough or wheezing, no sputum production. No hemoptysis. · Gastrointestinal: No abdominal pain, appetite loss, blood in stools. No change in bowel or bladder habits. · Genitourinary: No dysuria, trouble voiding, or hematuria. · Musculoskeletal:  No gait disturbance, no joint complaints. · Integumentary: No rash or pruritis. · Neurological: No headache, diplopia, change in muscle strength, numbness or tingling. · Psychiatric: No anxiety or depression. · Endocrine: No temperature intolerance. No excessive thirst, fluid intake, or urination. No tremor.   · Hematologic/Lymphatic: No abnormal bruising 18 11/13/2021    ALT 9 11/13/2021     PT/INR:  No results found for: PTINR  HgBA1c:No results found for: LABA1C  Lab Results   Component Value Date    TROPONINI 0.20 (H) 11/15/2021       Cardiac Data     EKG: Personally interpreted. 11/15/21. Normal sinus rhythm. Left axis deviation. Anterior infarct. No ischemic ST changes. Echo: 10/2018.  -Normal global systolic function with an ejection fraction estimated at 65%. -No regional wall motion abnormalities noted. -Mild left ventricular hypertrophy.  -Aortic valve appears sclerotic but opens adequately.  -Thickened mitral valve without evidence of stenosis. Mild mitral regurgitation.  -There is moderate tricuspid regurgitation with a RVSP estimation of 68 mmHg. -Grade II diastolic dysfunction with elevated LV filling pressures. E/e'=16.55    Assessment and Plan   1) NSTEMI. Troponin uptrending. Patient currently chest pain-free after receiving morphine. She is not a candidate for coronary angiography secondary to advanced age and goals of comfort care. Will anticoagulate for 48 hours with Lovenox unless medical power of /family is pursuing hospice care in which pain management only is appropriate. Otherwise continue medical management with aspirin, statin, and beta-blocker. 2) YAYO. Improved with IV fluid resuscitation. 3) Diarrhea/UTI. Will defer management to primary team.    4) Anemia. If hemoglobin downtrending would discontinue anticoagulation. 5) Iatrogenic hyperthyroidism. Will defer dosing adjustments to primary team.    6) Dementia. Uncertain of patient's baseline. Drowsy but received morphine this morning. Currently oriented x2.    7) Cachexia. BMI 14.5. Will defer nutritional recommendations to primary team.    Code status: Comfort care only. Overall, the problems requiring hospitalization are high in severity. Will sign off. Call with questions.      Thank you for allowing us to participate in the care of Autumn Thompson KIMBERLEY Isidro. Keshawn Dailey.  Aubrie Gonzalez, [address] [place] [address]  11/15/2021 11:29 AM

## 2021-11-16 NOTE — ED PROVIDER NOTES
**EVALUATED BY CHER**    [address] [facility]  eMERGENCY dEPARTMENT eNCOUnter    Pt Name: Nick Felix  MRN: [id number]  Lisandro 7/21/1931  Date of evaluation: 11/16/2021  Provider: MANDA Louis    Chief Complaint:    Chief Complaint   Patient presents with   Suzan Rodarte     pt arrived via EMS from [facility] d/t a fall with statement of hitting head unknown LOC; last well know was 0200; denies pain; place on hospice d/t abnormal labs; per EMS vitals were stable       Nursing Notes, Past Medical Hx, Past Surgical Hx, Social Hx, Allergies, and Family Hx were all reviewed and agreedwith or any disagreements were addressed in the HPI.    HPI:  (Location, Duration, Timing, Severity, Quality, Assoc Sx, Context, Modifying factors)  This is a  80 y.o. female who presents to the emergency department with complaints of having a mechanical fall. Patient states that she was using her walker to ambulate and had a mechanical fall causing her to fall in front of her walker. She denies to myself any head injury or loss of consciousness however apparently she had told EMS that she did hit her head. Patient is coming from [facility] skilled nursing [facility]. Patient was recently placed in hospice care yesterday. Patient denies any injury from the fall today. Denies any headache, neck pain, back pain, extremity upper or lower pain. No other complaints. She is alert and oriented x3. Denies pain. No radiation of symptoms. All other systems were reviewed and are negative.      Past Medical/Surgical History:      Diagnosis Date    Carpal tunnel syndrome of left wrist     Chronic low back pain     Depression     Rampart (hard of hearing)     Hyperlipidemia LDL goal <130     Hypothyroid     Insomnia     Osteoporosis     Urinary incontinence          Procedure Laterality Date    BACK SURGERY      HIP PINNING Left 1/26/2020    LEFT HIP PINNING performed by Adilson Ahumada MD at [address] [place] [address] Medications:  Previous Medications    ACETAMINOPHEN (TYLENOL) 500 MG TABLET    Take 500 mg by mouth every 4 hours as needed for Pain    AMITRIPTYLINE (ELAVIL) 10 MG TABLET    PRN    ASPIRIN 81 MG CHEWABLE TABLET    Take 1 tablet by mouth daily    CALCIUM CARBONATE-VITAMIN D (CALTRATE 600+D PO)    Take 1 tablet by mouth daily     DONEPEZIL (ARICEPT) 5 MG TABLET    Take 1 tablet by mouth nightly    LEVOTHYROXINE (SYNTHROID) 88 MCG TABLET    Take 1 tablet by mouth Daily    METOPROLOL TARTRATE (LOPRESSOR) 25 MG TABLET    Take 0.5 tablets by mouth 2 times daily    MIRTAZAPINE (REMERON) 15 MG TABLET    Take 7.5 mg by mouth nightly    OXYBUTYNIN (DITROPAN) 5 MG TABLET    TAKE ONE TABLET BY MOUTH TWICE A DAY    SERTRALINE (ZOLOFT) 100 MG TABLET    Take 100 mg by mouth daily     SIMVASTATIN (ZOCOR) 20 MG TABLET    TAKE ONE- HALF (1/2) TABLET BY MOUTH ONCE NIGHTLY     Review of Systems:  Review of Systems   Constitutional: Negative for fever. Respiratory: Negative for shortness of breath. Cardiovascular: Negative for chest pain. Gastrointestinal: Negative for abdominal pain, constipation, diarrhea, nausea and vomiting. Musculoskeletal: Negative for arthralgias, back pain, myalgias and neck pain. Skin: Negative for color change, rash and wound. Neurological: Negative for weakness and headaches. All other systems reviewed and are negative. Positives and Pertinent negatives as per HPI. Except as noted above in the ROS, all other systems were reviewed/completed and are negative. Physical Exam:  Physical Exam  Vitals and nursing note reviewed. Constitutional:       Appearance: Normal appearance. She is well-developed. She is not toxic-appearing or diaphoretic. HENT:      Head: Normocephalic. Right Ear: External ear normal.      Left Ear: External ear normal.      Nose: Nose normal.   Eyes:      General:         Right eye: No discharge. Left eye: No discharge.       Conjunctiva/sclera: Conjunctivae normal.   Cardiovascular:      Rate and Rhythm: Normal rate and regular rhythm. Heart sounds: Normal heart sounds. No murmur heard. No friction rub. No gallop. Pulmonary:      Effort: Pulmonary effort is normal. No respiratory distress. Breath sounds: Normal breath sounds. No wheezing or rales. Musculoskeletal:         General: No tenderness or signs of injury. Normal range of motion. Cervical back: Normal, normal range of motion and neck supple. No bony tenderness. No pain with movement. Thoracic back: Normal. No tenderness or bony tenderness. Lumbar back: Normal. No bony tenderness. Normal range of motion. Comments: Joints at shoulders, elbows, wrists, hips, knees and ankles are palpated without any pain with palpation, also normal range of motion at all joints. Skin:     General: Skin is warm and dry. Coloration: Skin is not pale. Neurological:      General: No focal deficit present. Mental Status: She is alert and oriented to person, place, and time. Mental status is at baseline. Sensory: No sensory deficit. Psychiatric:         Behavior: Behavior normal. Behavior is cooperative. MEDICAL DECISION MAKING    Vitals:    Vitals:    11/16/21 0755 11/16/21 0758 11/16/21 0830 11/16/21 0903   BP:       Pulse:   87 92   Resp:   22 30   Temp: 97.1 °F (36.2 °C)      SpO2:  100% 95% 99%   Weight:       Height:         LABS: Labs Reviewed - No data to display     Remainder of labs reviewed and were negative at this time or not returned at the time of this note.     RADIOLOGY:   Non-plain film images suchas CT, Ultrasound and MRI are read by the radiologist. Rhonda EDWARDS PA have directly visualized the radiologic plain film image(s) with the below findings:  Interpretation per the Radiologist below, if available at the time of this note:    No orders to display       [address] / ED COURSE:      PROCEDURES:   Procedures    Patient was given:  Medications - No data to display  Patient presents to the emergency department with mechanical fall. She states that she was using her walker and tripped. Patient at this time is alert and oriented x3 alert to person place and time. She denies to myself any head injury. Patient is listed as DNR CC based on previous chart review from her recent admission at [facility].  I spoke with her daughter who is also her power of  who does not wish to have any imaging done at this time although patient was reported to have a possible head injury. At this time I do not see any signs of traumatic head injury and she is moving all joints well and there is no cervical, thoracic or lumbar spine tenderness. Patient will be discharged back to skilled nursing facility under hospice care. Review of nursing notes, patient was apparently at 87% on room air at initial evaluation and placed on 2 L of oxygen. Review of patient's chart does not appear that patient is on any oxygen. I did take patient off of oxygen and she remains at 99%. Will discontinue oxygen throughout the rest of ED course. The patient tolerated their visit well. I have evaluated the patient with physician available for consultation as needed. I have discussed the findings of today's workup with the patient and addressed the patient's questions and concerns. Important warning signs as well as new or worsening symptoms which wouldnecessitate immediate return to the ED were discussed. The plan is to discharge from the ED at this time, and the patient is in stable condition. The patient acknowledged understanding is agreeable with this plan. CLINICAL IMPRESSION:  1.  Fall, initial encounter        DISPOSITION Decision To Discharge 11/16/2021 08:31:53 AM      PATIENT REFERRED TO:  Your primary care physician    Schedule an appointment as soon as possible for a visit       [facility] Emergency Department  [address] [facility] [address] [phone]  Go to   If symptoms worsen      DISCHARGE MEDICATIONS:  New Prescriptions    No medications on file              (Please note the MDM and HPI sections of this note were completed with avoice recognition program.  Efforts were made to edit the dictations but occasionally words are mis-transcribed.)    Electronically signed, Otilio Burkitt, PA,             MANDA Miranda  11/16/21 7929       MANDA Merino  11/16/21 7494

## 2021-11-16 NOTE — ED NOTES
Writer attempted to contact SNF to obtain medication list and hospice company name. No answer.      Cuco Mitchell RN  11/16/21 6635

## 2021-11-16 NOTE — ED NOTES
Writer called [facility] @ [phone]. Radha from hospice to investigate case.       Braeden Hanley RN  11/16/21 8280

## 2021-11-16 NOTE — ED TRIAGE NOTES
Pt arrived via EMS d/t unwitnessed fall with pt statement of hitting head and unknown LOC. Last well known was at 0200. Pt denies pain or discomfort. Placed on hospice d/t abnormal labs. EMS uncertain of hospice company. Per EMS vitals were stable. Upon assessment SpO2 @ 87% RA. O2 placed at 2L via NC currently SpO2 @ 100%.

## 2021-11-16 NOTE — ED NOTES
Bed: 18  Expected date:   Expected time:   Means of arrival: [facility]  Comments:  [facility]  11/16/21 9063

## 2021-11-19 NOTE — PROGRESS NOTES
Physician Progress Note      PATIENT:               Lazara Magallon  [facility] #:                  [id number]  :                       1931  ADMIT DATE:       2021 7:14 PM  [address] [place] DATE:        11/15/2021 4:13 PM  RESPONDING  PROVIDER #:        John Wright MD          QUERY TEXT:    Pt admitted with [facility]. Noted documentation of Severe malnutrition on 21   progress note by ordered dietician consultant. If possible, please document   in progress notes and discharge summary:    The medical record reflects the following:  Risk Factors: Admitted from nursing home with diarrhea x 2 weeks  Clinical Indicators: BMI 14,  5'6\",  89#. Marybeth Lynne Marybeth Lynne Per dietician consult note on   21--Severe malnutrition related to inadequate protein-energy intake as   evidenced by severe loss of subcutaneous fat, severe muscle loss  Treatment: Begin Ensure Enlive TID, Continue to monitor while inpatient. Thank Darwin Sharma RN BSN CDS CRCR  [email or web address]  Options provided:  -- Severe malnutrition confirmed present on admission  -- Severe malnutrition ruled out  -- Defer to dietician consultant documentation regarding severe malnutrition  -- Other - I will add my own diagnosis  -- Disagree - Not applicable / Not valid  -- Disagree - Clinically unable to determine / Unknown  -- Refer to Clinical Documentation Reviewer    PROVIDER RESPONSE TEXT:    The diagnosis of severe malnutrition was confirmed as present on admission.     Query created by: Tresa Knox on 2021 9:08 AM      Electronically signed by:  John Wright MD 2021 4:33 PM

## (undated) DEVICE — NEEDLE HYPO 22GA L1.5IN BLK POLYPR HUB S STL REG BVL STR

## (undated) DEVICE — CONTROL SYRINGE LUER-LOCK TIP: Brand: MONOJECT

## (undated) DEVICE — BANDAGE COBAN 4 IN COMPR W4INXL5YD FOAM COHESIVE QUIK STK SELF ADH SFT

## (undated) DEVICE — DRESSING,GAUZE,XEROFORM,CURAD,1"X8",ST: Brand: CURAD

## (undated) DEVICE — ELECTRODE PT RET AD L9FT HI MOIST COND ADH HYDRGEL CORDED

## (undated) DEVICE — Z DUP USE 2257490 ADHESIVE SKIN CLSRE 036ML TPCL 2CTL CNCRLTE HIGH VSCSTY DRMB

## (undated) DEVICE — STERILE LATEX POWDER-FREE SURGICAL GLOVESWITH NITRILE COATING: Brand: PROTEXIS

## (undated) DEVICE — PAD,ABDOMINAL,8"X10",ST,LF: Brand: MEDLINE

## (undated) DEVICE — STAPLER SKIN H3.9MM WIRE DIA0.58MM CRWN 6.9MM 35 STPL ROT

## (undated) DEVICE — RESTRAINT EXT ANK WRST LT BLU FOAM 2 STRP SIDE BCKL HK AND

## (undated) DEVICE — MAJOR SET UP PK

## (undated) DEVICE — 6619 2 PTNT ISO SYS INCISE AREA&LT;(&GT;&&LT;)&GT;P: Brand: STERI-DRAPE™ IOBAN™ 2

## (undated) DEVICE — CHLORAPREP 26ML ORANGE

## (undated) DEVICE — GLOVE 8 LTX ST TRIFLEX POWDER LK

## (undated) DEVICE — MERCY FAIRFIELD TURNOVER KIT: Brand: MEDLINE INDUSTRIES, INC.

## (undated) DEVICE — THREADED GUIDE WIRE

## (undated) DEVICE — SUTURE VCRL SZ 0 L36IN ABSRB UD L36MM CT-1 1/2 CIR J946H

## (undated) DEVICE — MAT FLR ABS DISP

## (undated) DEVICE — GAUZE,SPONGE,4"X4",8PLY,STRL,LF,10/TRAY: Brand: MEDLINE

## (undated) DEVICE — COVER LT HNDL BLU PLAS

## (undated) DEVICE — SUTURE VCRL SZ 3-0 L18IN ABSRB UD L26MM SH 1/2 CIR J864D

## (undated) DEVICE — CANNULATED DRILL